# Patient Record
Sex: FEMALE | Race: BLACK OR AFRICAN AMERICAN | NOT HISPANIC OR LATINO | Employment: FULL TIME | URBAN - METROPOLITAN AREA
[De-identification: names, ages, dates, MRNs, and addresses within clinical notes are randomized per-mention and may not be internally consistent; named-entity substitution may affect disease eponyms.]

---
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
## 2022-03-07 ENCOUNTER — TELEPHONE (OUTPATIENT)
Dept: OBSTETRICS AND GYNECOLOGY | Age: 22
End: 2022-03-07

## 2022-03-07 DIAGNOSIS — N92.6 MISSED MENSES: Primary | ICD-10-CM

## 2022-03-07 NOTE — TELEPHONE ENCOUNTER
Please advise if you will place HCG orders. PT has taken 2 positive pregnancy tests and would still like to have labs drawn, pt understands this will not necessarily confirm the pregnancy or tell her how many weeks she may be.

## 2022-03-09 ENCOUNTER — TELEPHONE (OUTPATIENT)
Dept: OBSTETRICS AND GYNECOLOGY | Age: 22
End: 2022-03-09

## 2022-03-09 DIAGNOSIS — O21.9 NAUSEA AND VOMITING DURING PREGNANCY: Primary | ICD-10-CM

## 2022-03-09 LAB — HCG INTACT+B SERPL-ACNC: NORMAL MIU/ML

## 2022-03-09 RX ORDER — DIPHENHYDRAMINE HYDROCHLORIDE 25 MG/1
25 CAPSULE ORAL 3 TIMES DAILY
Qty: 90 TABLET | Refills: 2 | Status: SHIPPED | OUTPATIENT
Start: 2022-03-09 | End: 2022-09-15

## 2022-03-09 NOTE — PROGRESS NOTES
Patient notified about results - scheduled pregn tahmina 3/16/22 - lmp 12/30/21 . Patient experiencing nausea would like something to be called into pharmacy .

## 2022-03-16 ENCOUNTER — OFFICE VISIT (OUTPATIENT)
Dept: OBSTETRICS AND GYNECOLOGY | Age: 22
End: 2022-03-16

## 2022-03-16 VITALS
SYSTOLIC BLOOD PRESSURE: 108 MMHG | DIASTOLIC BLOOD PRESSURE: 62 MMHG | WEIGHT: 123.8 LBS | BODY MASS INDEX: 20.62 KG/M2 | HEIGHT: 65 IN

## 2022-03-16 DIAGNOSIS — Z3A.01 LESS THAN 8 WEEKS GESTATION OF PREGNANCY: Primary | ICD-10-CM

## 2022-03-16 DIAGNOSIS — O21.9 NAUSEA AND VOMITING DURING PREGNANCY: ICD-10-CM

## 2022-03-16 PROCEDURE — 99214 OFFICE O/P EST MOD 30 MIN: CPT | Performed by: NURSE PRACTITIONER

## 2022-03-16 NOTE — PROGRESS NOTES
"Subjective     Chief Complaint   Patient presents with   • Follow-up     GYN F/U, Pregnancy Confirmation, LMP 2021, Pt C/O of morning sickness with vomiting       Paulette Martinez is a 21 y.o.  whose LMP is Patient's last menstrual period was 2021 (exact date).     Pt presents today for confirmation of pregnancy  U/S confirms live IUP at 6+5  LMP not c/w u/s  EDC 2022  She is complaining of morning sickness with vomiting  She was taking vitamin B6, never got the unisom  No other concerns or complaints today   Pt of Dr. Metz     No Additional Complaints Reported    The following portions of the patient's history were reviewed and updated as appropriate:vital signs, allergies, current medications, past medical history, past social history, past surgical history and problem list      Review of Systems   Pertinent items are noted in HPI.     Objective      /62   Ht 165.1 cm (65\")   Wt 56.2 kg (123 lb 12.8 oz)   LMP 2021 (Exact Date)   Breastfeeding No   BMI 20.60 kg/m²     Physical Exam    General:   alert and no distress   Heart: Not performed today   Lungs: Not performed today.   Breast: Not performed today   Neck: na   Abdomen: {Not performed today   CVA: Not performed today   Pelvis: Not performed today   Extremities: Not performed today   Neurologic: negative   Psychiatric: Normal affect, judgement, and mood       Lab Review   Labs: No data reviewed     Imaging   Ultrasound - Pelvic Vaginal    Assessment/Plan     ASSESSMENT  1. Less than 8 weeks gestation of pregnancy    2. Nausea and vomiting during pregnancy        PLAN  1.   Orders Placed This Encounter   Procedures   • Urine Culture - Urine, Urine, Clean Catch   • Chlamydia trachomatis, Neisseria gonorrhoeae, Trichomonas vaginalis, PCR - Swab, Urine, Clean Catch   • OB Panel With HIV   • Varicella Zoster Antibody, IgG   • Hemoglobinopathy Fractionation Cascade   • Drug Profile Urine - 9 Drugs - Urine, Clean Catch "       2. Medications prescribed this encounter:        New Medications Ordered This Visit   Medications   • doxylamine (UNISOM) 25 MG tablet     Sig: Take 1 tablet by mouth At Night As Needed for Sleep.     Dispense:  30 tablet     Refill:  1       3. Unisom sent to correct pharmacy. Prenatal folder and nipt info given to patient. Prenatal labs today. SAB warnings.    Follow up: 4 week(s) with Dr. Isaías Adkins, APRN  3/16/2022

## 2022-03-28 LAB
ABO GROUP BLD: ABNORMAL
AMPHETAMINES UR QL SCN: NEGATIVE NG/ML
BACTERIA UR CULT: NO GROWTH
BACTERIA UR CULT: NORMAL
BARBITURATES UR QL SCN: NEGATIVE NG/ML
BASOPHILS # BLD AUTO: 0 X10E3/UL (ref 0–0.2)
BASOPHILS NFR BLD AUTO: 0 %
BENZODIAZ UR QL: NEGATIVE NG/ML
BLD GP AB SCN SERPL QL: NEGATIVE
BZE UR QL: NEGATIVE NG/ML
C TRACH RRNA SPEC QL NAA+PROBE: NEGATIVE
CANNABINOIDS UR CFM-MCNC: POSITIVE NG/ML
EOSINOPHIL # BLD AUTO: 0 X10E3/UL (ref 0–0.4)
EOSINOPHIL NFR BLD AUTO: 0 %
ERYTHROCYTE [DISTWIDTH] IN BLOOD BY AUTOMATED COUNT: 13 % (ref 11.7–15.4)
HBV SURFACE AG SERPL QL IA: NEGATIVE
HCT VFR BLD AUTO: 38 % (ref 34–46.6)
HCV AB S/CO SERPL IA: <0.1 S/CO RATIO (ref 0–0.9)
HGB A MFR BLD ELPH: 97.9 % (ref 96.4–98.8)
HGB A2 MFR BLD ELPH: 2.1 % (ref 1.8–3.2)
HGB BLD-MCNC: 13 G/DL (ref 11.1–15.9)
HGB F MFR BLD ELPH: 0 % (ref 0–2)
HGB FRACT BLD-IMP: NORMAL
HGB S MFR BLD ELPH: 0 %
HIV 1+2 AB+HIV1 P24 AG SERPL QL IA: NON REACTIVE
IMM GRANULOCYTES # BLD AUTO: 0 X10E3/UL (ref 0–0.1)
IMM GRANULOCYTES NFR BLD AUTO: 0 %
LYMPHOCYTES # BLD AUTO: 1.3 X10E3/UL (ref 0.7–3.1)
LYMPHOCYTES NFR BLD AUTO: 14 %
MCH RBC QN AUTO: 30.8 PG (ref 26.6–33)
MCHC RBC AUTO-ENTMCNC: 34.2 G/DL (ref 31.5–35.7)
MCV RBC AUTO: 90 FL (ref 79–97)
METHADONE UR QL SCN: NEGATIVE NG/ML
MONOCYTES # BLD AUTO: 0.7 X10E3/UL (ref 0.1–0.9)
MONOCYTES NFR BLD AUTO: 8 %
N GONORRHOEA RRNA SPEC QL NAA+PROBE: NEGATIVE
NEUTROPHILS # BLD AUTO: 7.4 X10E3/UL (ref 1.4–7)
NEUTROPHILS NFR BLD AUTO: 78 %
OPIATES UR QL: NEGATIVE NG/ML
PCP UR QL: NEGATIVE NG/ML
PLATELET # BLD AUTO: 261 X10E3/UL (ref 150–450)
PROPOXYPH UR QL SCN: NEGATIVE NG/ML
RBC # BLD AUTO: 4.22 X10E6/UL (ref 3.77–5.28)
RH BLD: NEGATIVE
RPR SER QL: NON REACTIVE
RUBV IGG SERPL IA-ACNC: 16.9 INDEX
T VAGINALIS RRNA SPEC QL NAA+PROBE: NEGATIVE
VZV IGG SER IA-ACNC: 805 INDEX
WBC # BLD AUTO: 9.5 X10E3/UL (ref 3.4–10.8)

## 2022-03-30 PROBLEM — O26.899 RH NEGATIVE, ANTEPARTUM: Status: ACTIVE | Noted: 2022-03-30

## 2022-03-30 PROBLEM — Z67.91 RH NEGATIVE, ANTEPARTUM: Status: ACTIVE | Noted: 2022-03-30

## 2022-03-30 PROBLEM — F12.90 MARIJUANA USE: Status: ACTIVE | Noted: 2022-03-30

## 2022-04-11 ENCOUNTER — TELEPHONE (OUTPATIENT)
Dept: OBSTETRICS AND GYNECOLOGY | Age: 22
End: 2022-04-11

## 2022-04-11 NOTE — TELEPHONE ENCOUNTER
Pt unable to get out of bed, unable to keep down food or liquids since saterday.Pt asking for something different for nausea.dn   Universal Safety Interventions

## 2022-04-15 ENCOUNTER — INITIAL PRENATAL (OUTPATIENT)
Dept: OBSTETRICS AND GYNECOLOGY | Age: 22
End: 2022-04-15

## 2022-04-15 VITALS — DIASTOLIC BLOOD PRESSURE: 62 MMHG | WEIGHT: 122 LBS | BODY MASS INDEX: 20.3 KG/M2 | SYSTOLIC BLOOD PRESSURE: 106 MMHG

## 2022-04-15 DIAGNOSIS — O26.899 RH NEGATIVE, ANTEPARTUM: ICD-10-CM

## 2022-04-15 DIAGNOSIS — Z34.01 ENCOUNTER FOR PRENATAL CARE IN FIRST TRIMESTER OF FIRST PREGNANCY: Primary | ICD-10-CM

## 2022-04-15 DIAGNOSIS — Z13.89 SCREENING FOR BLOOD OR PROTEIN IN URINE: ICD-10-CM

## 2022-04-15 DIAGNOSIS — Z67.91 RH NEGATIVE, ANTEPARTUM: ICD-10-CM

## 2022-04-15 DIAGNOSIS — O21.9 NAUSEA AND VOMITING IN PREGNANCY: ICD-10-CM

## 2022-04-15 DIAGNOSIS — O23.41 URINARY TRACT INFECTION IN MOTHER DURING FIRST TRIMESTER OF PREGNANCY: ICD-10-CM

## 2022-04-15 PROBLEM — O23.40 UTI (URINARY TRACT INFECTION) DURING PREGNANCY: Status: ACTIVE | Noted: 2022-04-15

## 2022-04-15 LAB
BILIRUB BLD-MCNC: NEGATIVE MG/DL
GLUCOSE UR STRIP-MCNC: NEGATIVE MG/DL
KETONES UR QL: NEGATIVE
LEUKOCYTE EST, POC: NEGATIVE
NITRITE UR-MCNC: NORMAL MG/ML
PH UR: 5.5 [PH] (ref 5–8)
PROT UR STRIP-MCNC: NEGATIVE MG/DL
RBC # UR STRIP: NEGATIVE /UL
SP GR UR: 1.03 (ref 1–1.03)
UROBILINOGEN UR QL: NORMAL

## 2022-04-15 PROCEDURE — 99214 OFFICE O/P EST MOD 30 MIN: CPT | Performed by: OBSTETRICS & GYNECOLOGY

## 2022-04-15 RX ORDER — ONDANSETRON 4 MG/1
4 TABLET, FILM COATED ORAL EVERY 6 HOURS PRN
Qty: 30 TABLET | Refills: 1 | Status: SHIPPED | OUTPATIENT
Start: 2022-04-15 | End: 2022-06-10 | Stop reason: SDUPTHER

## 2022-04-15 RX ORDER — NITROFURANTOIN 25; 75 MG/1; MG/1
100 CAPSULE ORAL
COMMUNITY
Start: 2022-04-11 | End: 2022-04-18

## 2022-04-15 RX ORDER — ONDANSETRON 4 MG/1
TABLET, ORALLY DISINTEGRATING ORAL
COMMUNITY
Start: 2022-04-11 | End: 2022-04-15

## 2022-04-15 NOTE — PROGRESS NOTES
Chief Complaint   Patient presents with   • Routine Prenatal Visit     Cc: ob intake , sheldon today , prenatal labs normal - pt here with her boyfriend , nausea decreased ,macrobid for 6 more days - UTI        HPI: 21 y.o.  at 11w0d presents for prenatal care     Vitals:    04/15/22 0937   BP: 106/62   Weight: 55.3 kg (122 lb)     Total weight gain for pregnancy:  -0.454 kg (-1 lb)    Review of systems:     Gen: negative  CV:     negative  GI: nausea  :   negative  MS:    negative  Neuro: negative  Pul: negative      A/P  1. Intrauterine pregnancy at 11w0d   2. Pregnancy Risk:  HIGH RISK        Diagnoses and all orders for this visit:    1. Encounter for prenatal care in first trimester of first pregnancy (Primary)    2. Screening for blood or protein in urine  -     Cancel: POC Urinalysis Dipstick  -     POC Urinalysis Dipstick    3. Rh negative, antepartum    4. Nausea and vomiting in pregnancy  -     ondansetron (Zofran) 4 MG tablet; Take 1 tablet by mouth Every 6 (Six) Hours As Needed for Nausea or Vomiting.  Dispense: 30 tablet; Refill: 1    5. Urinary tract infection in mother during first trimester of pregnancy        Nutrition and weight gain were addressed.  Practice OB call structure was discussed.       -----------------------  PLAN:   Return in about 27 days (around 2022), or ob check.  OB intake done   Prenatal labs reviewed   Desires NIPT today   SAB warnings   Rh neg - rhogam at 28 weeks  4 weeks   Counseling was given to patient and partner for the following topics: diagnostic results, instructions for management, risk factor reductions and patient and family education . Total time of the encounter was 30 minutes and 25 minutes was spent counseling.      Cristy Metz MD  4/15/2022 11:39 EDT

## 2022-05-12 ENCOUNTER — ROUTINE PRENATAL (OUTPATIENT)
Dept: OBSTETRICS AND GYNECOLOGY | Age: 22
End: 2022-05-12

## 2022-05-12 VITALS — BODY MASS INDEX: 20.97 KG/M2 | DIASTOLIC BLOOD PRESSURE: 68 MMHG | SYSTOLIC BLOOD PRESSURE: 110 MMHG | WEIGHT: 126 LBS

## 2022-05-12 DIAGNOSIS — Z34.02 ENCOUNTER FOR PRENATAL CARE IN SECOND TRIMESTER OF FIRST PREGNANCY: Primary | ICD-10-CM

## 2022-05-12 DIAGNOSIS — O21.9 NAUSEA AND VOMITING IN PREGNANCY: ICD-10-CM

## 2022-05-12 DIAGNOSIS — O26.899 RH NEGATIVE, ANTEPARTUM: ICD-10-CM

## 2022-05-12 DIAGNOSIS — Z13.89 SCREENING FOR BLOOD OR PROTEIN IN URINE: ICD-10-CM

## 2022-05-12 DIAGNOSIS — Z67.91 RH NEGATIVE, ANTEPARTUM: ICD-10-CM

## 2022-05-12 LAB
BILIRUB BLD-MCNC: NEGATIVE MG/DL
CLARITY, POC: CLEAR
COLOR UR: YELLOW
GLUCOSE UR STRIP-MCNC: NEGATIVE MG/DL
KETONES UR QL: NEGATIVE
LEUKOCYTE EST, POC: NEGATIVE
NITRITE UR-MCNC: NEGATIVE MG/ML
PH UR: 8 [PH] (ref 5–8)
PROT UR STRIP-MCNC: NEGATIVE MG/DL
RBC # UR STRIP: NEGATIVE /UL
SP GR UR: 1.02 (ref 1–1.03)
UROBILINOGEN UR QL: NORMAL

## 2022-05-12 PROCEDURE — 99213 OFFICE O/P EST LOW 20 MIN: CPT | Performed by: OBSTETRICS & GYNECOLOGY

## 2022-05-12 NOTE — PROGRESS NOTES
Chief Complaint   Patient presents with   • Routine Prenatal Visit     ob ck; no cc        HPI: 21 y.o.  at 14w6d presents for prenatal care     Vitals:    22 1113   BP: 110/68   Weight: 57.2 kg (126 lb)     Total weight gain for pregnancy:  1.361 kg (3 lb)    Review of systems:     Gen: negative  CV:     negative  GI: negative  :   negative  MS:    negative  Neuro: negative  Pul: negative      A/P  1. Intrauterine pregnancy at 14w6d   2. Pregnancy Risk:  HIGH        Diagnoses and all orders for this visit:    1. Encounter for prenatal care in second trimester of first pregnancy (Primary)    2. Screening for blood or protein in urine  -     POC Urinalysis Dipstick    3. Rh negative, antepartum    4. Nausea and vomiting in pregnancy        Nutrition and weight gain were addressed.      -----------------------  PLAN:   Return in about 1 month (around 2022), or ob check and anatomy US.  NIPT LR   Nausea- improved   Enc increased H20   SAB warnings      Rh neg - Rhogam at 28 weeks    Cristy Metz MD  2022 11:36 EDT

## 2022-06-10 ENCOUNTER — ROUTINE PRENATAL (OUTPATIENT)
Dept: OBSTETRICS AND GYNECOLOGY | Age: 22
End: 2022-06-10

## 2022-06-10 VITALS — WEIGHT: 134 LBS | DIASTOLIC BLOOD PRESSURE: 72 MMHG | SYSTOLIC BLOOD PRESSURE: 120 MMHG | BODY MASS INDEX: 22.3 KG/M2

## 2022-06-10 DIAGNOSIS — O21.9 NAUSEA AND VOMITING IN PREGNANCY: ICD-10-CM

## 2022-06-10 DIAGNOSIS — Z34.02 ENCOUNTER FOR PRENATAL CARE IN SECOND TRIMESTER OF FIRST PREGNANCY: Primary | ICD-10-CM

## 2022-06-10 DIAGNOSIS — Z13.89 SCREENING FOR BLOOD OR PROTEIN IN URINE: ICD-10-CM

## 2022-06-10 DIAGNOSIS — Z36.86 ENCOUNTER FOR ANTENATAL SCREENING FOR CERVICAL LENGTH: ICD-10-CM

## 2022-06-10 DIAGNOSIS — Z36.89 ENCOUNTER FOR ROUTINE FETAL ULTRASOUND: ICD-10-CM

## 2022-06-10 DIAGNOSIS — Z67.91 RH NEGATIVE, ANTEPARTUM: ICD-10-CM

## 2022-06-10 DIAGNOSIS — O26.899 RH NEGATIVE, ANTEPARTUM: ICD-10-CM

## 2022-06-10 LAB
BILIRUB BLD-MCNC: NEGATIVE MG/DL
GLUCOSE UR STRIP-MCNC: NEGATIVE MG/DL
KETONES UR QL: NEGATIVE
LEUKOCYTE EST, POC: NEGATIVE
NITRITE UR-MCNC: NEGATIVE MG/ML
PH UR: 7 [PH] (ref 5–8)
PROT UR STRIP-MCNC: NEGATIVE MG/DL
RBC # UR STRIP: NEGATIVE /UL
SP GR UR: 1.02 (ref 1–1.03)
UROBILINOGEN UR QL: NORMAL

## 2022-06-10 PROCEDURE — 99213 OFFICE O/P EST LOW 20 MIN: CPT | Performed by: OBSTETRICS & GYNECOLOGY

## 2022-06-10 PROCEDURE — 76817 TRANSVAGINAL US OBSTETRIC: CPT | Performed by: OBSTETRICS & GYNECOLOGY

## 2022-06-10 PROCEDURE — 76805 OB US >/= 14 WKS SNGL FETUS: CPT | Performed by: OBSTETRICS & GYNECOLOGY

## 2022-06-10 RX ORDER — ONDANSETRON 4 MG/1
4 TABLET, FILM COATED ORAL EVERY 6 HOURS PRN
Qty: 30 TABLET | Refills: 1 | Status: SHIPPED | OUTPATIENT
Start: 2022-06-10 | End: 2022-09-15

## 2022-06-10 NOTE — PROGRESS NOTES
Chief Complaint   Patient presents with   • Routine Prenatal Visit     Cc: ob check and anatomy today, no complaints today - pnv samples given , pt needs a refill on zofran please uses as prn        HPI: 21 y.o.  at 19w0d presents for prenatal care      Vitals:    06/10/22 1002   BP: 120/72   Weight: 60.8 kg (134 lb)     Total weight gain for pregnancy:  4.99 kg (11 lb)    Review of systems:   Gen: negative  CV:     negative  GI: nausea  :   negative and good fetal movement noted   MS:    negative  Neuro: negative  Pul: negative    A/P  1. Intrauterine pregnancy at 19w0d   2. Pregnancy Risk:  HIGH RISK        Diagnoses and all orders for this visit:    1. Screening for blood or protein in urine (Primary)  -     POC Urinalysis Dipstick    2. Encounter for routine fetal ultrasound  -     US Ob 14 + Weeks Single or First Gestation    3. Encounter for  screening for cervical length  -     US Ob Transvaginal    4. Rh negative, antepartum    5. Encounter for prenatal care in second trimester of first pregnancy    6. Nausea and vomiting in pregnancy  -     ondansetron (Zofran) 4 MG tablet; Take 1 tablet by mouth Every 6 (Six) Hours As Needed for Nausea or Vomiting.  Dispense: 30 tablet; Refill: 1        Nutrition and weight gain were addressed.  -----------------------  PLAN:   Return in about 31 days (around 2022), or ob check.  Normal completed anatomy today   Normal CL today   Nausea - controlled with Zofran   SAB warnings     Cristy Metz MD  6/10/2022 10:26 EDT

## 2022-07-11 ENCOUNTER — ROUTINE PRENATAL (OUTPATIENT)
Dept: OBSTETRICS AND GYNECOLOGY | Age: 22
End: 2022-07-11

## 2022-07-11 DIAGNOSIS — Z34.02 ENCOUNTER FOR PRENATAL CARE IN SECOND TRIMESTER OF FIRST PREGNANCY: Primary | ICD-10-CM

## 2022-07-11 DIAGNOSIS — Z67.91 RH NEGATIVE, ANTEPARTUM: ICD-10-CM

## 2022-07-11 DIAGNOSIS — O26.899 RH NEGATIVE, ANTEPARTUM: ICD-10-CM

## 2022-07-11 DIAGNOSIS — Z13.89 SCREENING FOR BLOOD OR PROTEIN IN URINE: ICD-10-CM

## 2022-07-11 PROCEDURE — 99213 OFFICE O/P EST LOW 20 MIN: CPT | Performed by: OBSTETRICS & GYNECOLOGY

## 2022-07-11 NOTE — PROGRESS NOTES
Chief Complaint   Patient presents with   • Routine Prenatal Visit     Cc: ob check doing well - more pnv samples given to pt        HPI: 21 y.o.  at 23w3d presents for prenatal care      Vitals:    22 1151   BP: 112/67   Weight: 63.5 kg (140 lb)     Total weight gain for pregnancy:  7.711 kg (17 lb)    Review of systems:   Gen: negative  CV:     negative  GI: negative  :   negative and good fetal movement noted   MS:    negative  Neuro: negative  Pul: negative    A/P  1. Intrauterine pregnancy at 23w3d   2. Pregnancy Risk:  HIGH RISK        Diagnoses and all orders for this visit:    1. Encounter for prenatal care in second trimester of first pregnancy (Primary)    2. Screening for blood or protein in urine  -     POC Urinalysis Dipstick    3. Rh negative, antepartum         labor was discussed.  Warnings were provided.  Nutrition and weight gain were addressed.  -----------------------  PLAN:   Return in about 4 weeks (around 2022), or ob check and one-hour gtt and growth US.  Rh neg - Rhogam next visit   One-hour gtt and tdap next visit   PTL warnings   4 weeks for growth US        Cristy Metz MD  2022 12:02 EDT

## 2022-08-04 ENCOUNTER — REFERRAL TRIAGE (OUTPATIENT)
Dept: LABOR AND DELIVERY | Facility: HOSPITAL | Age: 22
End: 2022-08-04

## 2022-08-05 ENCOUNTER — PATIENT OUTREACH (OUTPATIENT)
Dept: LABOR AND DELIVERY | Facility: HOSPITAL | Age: 22
End: 2022-08-05

## 2022-08-05 NOTE — OUTREACH NOTE
[facility]  Enrollment    Current Estimated Gestational Age: 27w0d    Questions/Answers    Flowsheet Row Responses   Would like to participate? Yes          F/U 8/15    Stephany Thomas, RN  Maternity Nurse Navigator    8/5/2022, 14:48 EDT

## 2022-08-08 ENCOUNTER — ROUTINE PRENATAL (OUTPATIENT)
Dept: OBSTETRICS AND GYNECOLOGY | Age: 22
End: 2022-08-08

## 2022-08-08 VITALS — DIASTOLIC BLOOD PRESSURE: 64 MMHG | SYSTOLIC BLOOD PRESSURE: 110 MMHG | BODY MASS INDEX: 26.13 KG/M2 | WEIGHT: 157 LBS

## 2022-08-08 VITALS — WEIGHT: 149 LBS | SYSTOLIC BLOOD PRESSURE: 112 MMHG | BODY MASS INDEX: 24.79 KG/M2 | DIASTOLIC BLOOD PRESSURE: 67 MMHG

## 2022-08-08 DIAGNOSIS — Z34.02 ENCOUNTER FOR PRENATAL CARE IN SECOND TRIMESTER OF FIRST PREGNANCY: Primary | ICD-10-CM

## 2022-08-08 DIAGNOSIS — Z13.0 SCREENING FOR IRON DEFICIENCY ANEMIA: ICD-10-CM

## 2022-08-08 DIAGNOSIS — O99.019 MATERNAL ANEMIA IN PREGNANCY, ANTEPARTUM: ICD-10-CM

## 2022-08-08 DIAGNOSIS — Z23 ENCOUNTER FOR ADMINISTRATION OF VACCINE: ICD-10-CM

## 2022-08-08 DIAGNOSIS — O26.892 RH NEGATIVE STATUS DURING PREGNANCY IN SECOND TRIMESTER: ICD-10-CM

## 2022-08-08 DIAGNOSIS — Z13.1 SCREENING FOR DIABETES MELLITUS: ICD-10-CM

## 2022-08-08 DIAGNOSIS — Z13.89 SCREENING FOR BLOOD OR PROTEIN IN URINE: ICD-10-CM

## 2022-08-08 DIAGNOSIS — O26.899 RH NEGATIVE, ANTEPARTUM: ICD-10-CM

## 2022-08-08 DIAGNOSIS — Z67.91 RH NEGATIVE, ANTEPARTUM: ICD-10-CM

## 2022-08-08 DIAGNOSIS — Z67.91 RH NEGATIVE STATUS DURING PREGNANCY IN SECOND TRIMESTER: ICD-10-CM

## 2022-08-08 DIAGNOSIS — Z3A.27 27 WEEKS GESTATION OF PREGNANCY: ICD-10-CM

## 2022-08-08 LAB
BILIRUB BLD-MCNC: NEGATIVE MG/DL
GLUCOSE UR STRIP-MCNC: NEGATIVE MG/DL
KETONES UR QL: NEGATIVE
LEUKOCYTE EST, POC: NEGATIVE
NITRITE UR-MCNC: NEGATIVE MG/ML
PH UR: 6 [PH] (ref 5–8)
PROT UR STRIP-MCNC: NEGATIVE MG/DL
RBC # UR STRIP: NEGATIVE /UL
SP GR UR: 1.02 (ref 1–1.03)
UROBILINOGEN UR QL: NORMAL

## 2022-08-08 PROCEDURE — 90715 TDAP VACCINE 7 YRS/> IM: CPT | Performed by: OBSTETRICS & GYNECOLOGY

## 2022-08-08 PROCEDURE — 96372 THER/PROPH/DIAG INJ SC/IM: CPT | Performed by: OBSTETRICS & GYNECOLOGY

## 2022-08-08 PROCEDURE — 90471 IMMUNIZATION ADMIN: CPT | Performed by: OBSTETRICS & GYNECOLOGY

## 2022-08-08 PROCEDURE — 99213 OFFICE O/P EST LOW 20 MIN: CPT | Performed by: OBSTETRICS & GYNECOLOGY

## 2022-08-08 NOTE — PROGRESS NOTES
Chief Complaint   Patient presents with   • Routine Prenatal Visit     Cc: ob check and 1 hr gtt with growth us - tdap and rhogam , no ob complaints        HPI: 21 y.o.  at 27w3d presents for prenatal care      Vitals:    22 1139   BP: 110/64   Weight: 71.2 kg (157 lb)     Total weight gain for pregnancy:  15.4 kg (34 lb)    Review of systems:   Gen: negative  CV:     negative  GI: negative  :   negative and good fetal movement noted   MS:    negative  Neuro: negative  Pul: negative    A/P  1. Intrauterine pregnancy at 27w3d   2. Pregnancy Risk:  HIGH RISK        Diagnoses and all orders for this visit:    1. Encounter for prenatal care in second trimester of first pregnancy (Primary)    2. Screening for iron deficiency anemia  -     Gestational Screen 1 Hr ([facility])  -     POC Urinalysis Dipstick  -     Hemoglobin & Hematocrit, Blood  -     Antibody Screen    3. Screening for diabetes mellitus  -     Gestational Screen 1 Hr ([facility])  -     POC Urinalysis Dipstick  -     Hemoglobin & Hematocrit, Blood  -     Antibody Screen    4. Encounter for administration of vaccine  -     Gestational Screen 1 Hr ([facility])  -     POC Urinalysis Dipstick  -     Hemoglobin & Hematocrit, Blood  -     Antibody Screen    5. Screening for blood or protein in urine  -     Gestational Screen 1 Hr ([facility])  -     POC Urinalysis Dipstick  -     Hemoglobin & Hematocrit, Blood  -     Antibody Screen    6. Rh negative status during pregnancy in second trimester  -     Gestational Screen 1 Hr ([facility])  -     POC Urinalysis Dipstick  -     Hemoglobin & Hematocrit, Blood  -     Antibody Screen    7. 27 weeks gestation of pregnancy  -     Gestational Screen 1 Hr ([facility])  -     POC Urinalysis Dipstick  -     Hemoglobin & Hematocrit, Blood  -     Antibody Screen    8. Rh negative, antepartum    Other orders  -     Rhogam Immune Globulin Immunization  -     Tdap Vaccine Greater Than or Equal To 6yo IM         labor was  discussed.  Warnings were provided.  -----------------------  PLAN:   Return in about 2 weeks (around 8/22/2022), or ob check.  One-hour gtt and tdap today   Rh neg - Rhogam today   LGA on US today - EFW 95%  Serial growth   PTL warnings        Cristy Metz MD  8/8/2022 11:43 EDT

## 2022-08-09 PROBLEM — O99.019 MATERNAL ANEMIA IN PREGNANCY, ANTEPARTUM: Status: ACTIVE | Noted: 2022-08-09

## 2022-08-09 LAB
BLD GP AB SCN SERPL QL: NEGATIVE
GLUCOSE 1H P 50 G GLC PO SERPL-MCNC: 115 MG/DL (ref 65–139)
HCT VFR BLD AUTO: 28.2 % (ref 34–46.6)
HGB BLD-MCNC: 9.3 G/DL (ref 11.1–15.9)

## 2022-08-09 RX ORDER — FERROUS SULFATE 325(65) MG
325 TABLET ORAL
Qty: 60 TABLET | Refills: 2 | Status: SHIPPED | OUTPATIENT
Start: 2022-08-09 | End: 2023-01-13

## 2022-08-09 NOTE — PROGRESS NOTES
Call patient and notify of normal results of one -hour gtt but very anemic - start Iron supplement twice per day sent to pharmacy

## 2022-08-15 ENCOUNTER — PATIENT OUTREACH (OUTPATIENT)
Dept: LABOR AND DELIVERY | Facility: HOSPITAL | Age: 22
End: 2022-08-15

## 2022-08-15 NOTE — OUTREACH NOTE
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 physically tired and sore, but mentally good   Are you having any of the following symptoms? Other   Other Symptoms: leg cramps   Questions regarding prenatal visits or tests to be ordered? No   New Diagnosis Anemia  [taking PO iron, baby in the 95th precentile ]   Education related to new diagnoses/home equipment No   May I ask you questions about your substance use? Yes   Other Comment denies   Supplies ready for baby Car Seat, Clothing  [thinks she will get diapers, bottles, and bassinet at baby shower ]   Resource/Environmental Concerns None   Do you have any questions related to your care experience, your pregnancy, plans for delivery, any concerns, etc? No          [facility]  Intake    Current Estimated Gestational Age: 28w3d    Questions/Answers    Flowsheet Row Responses   Best Method for Contacting Cell   Do you have a dentist? Yes   Dentist Name [facility]   Have you seen a dentist in the last 6 months Yes   Next Appointment: 08/17/22  [root canal this week ]   Resources Presently Utilizing: WIC (Women, Infant, Children)   Maternal Warning Signs Provided   Other: Provided        F/U Oct 10    Stephany Thomas, RN  Maternity Nurse Navigator    8/15/2022, 17:10 EDT

## 2022-08-18 ENCOUNTER — TELEPHONE (OUTPATIENT)
Dept: OBSTETRICS AND GYNECOLOGY | Age: 22
End: 2022-08-18

## 2022-08-22 ENCOUNTER — ROUTINE PRENATAL (OUTPATIENT)
Dept: OBSTETRICS AND GYNECOLOGY | Age: 22
End: 2022-08-22

## 2022-08-22 VITALS — SYSTOLIC BLOOD PRESSURE: 110 MMHG | WEIGHT: 161 LBS | BODY MASS INDEX: 26.79 KG/M2 | DIASTOLIC BLOOD PRESSURE: 74 MMHG

## 2022-08-22 DIAGNOSIS — O26.899 RH NEGATIVE, ANTEPARTUM: ICD-10-CM

## 2022-08-22 DIAGNOSIS — Z13.89 SCREENING FOR BLOOD OR PROTEIN IN URINE: ICD-10-CM

## 2022-08-22 DIAGNOSIS — O99.019 MATERNAL ANEMIA IN PREGNANCY, ANTEPARTUM: ICD-10-CM

## 2022-08-22 DIAGNOSIS — Z34.03 ENCOUNTER FOR PRENATAL CARE IN THIRD TRIMESTER OF FIRST PREGNANCY: Primary | ICD-10-CM

## 2022-08-22 DIAGNOSIS — O36.62X0 EXCESSIVE FETAL GROWTH AFFECTING MANAGEMENT OF PREGNANCY IN SECOND TRIMESTER, SINGLE OR UNSPECIFIED FETUS: ICD-10-CM

## 2022-08-22 DIAGNOSIS — Z67.91 RH NEGATIVE, ANTEPARTUM: ICD-10-CM

## 2022-08-22 PROBLEM — O36.60X0 LGA (LARGE FOR GESTATIONAL AGE) FETUS AFFECTING MANAGEMENT OF MOTHER: Status: ACTIVE | Noted: 2022-08-22

## 2022-08-22 LAB
BILIRUB BLD-MCNC: NEGATIVE MG/DL
GLUCOSE UR STRIP-MCNC: NEGATIVE MG/DL
KETONES UR QL: NEGATIVE
LEUKOCYTE EST, POC: NEGATIVE
NITRITE UR-MCNC: NEGATIVE MG/ML
PH UR: 6 [PH] (ref 5–8)
PROT UR STRIP-MCNC: NEGATIVE MG/DL
RBC # UR STRIP: NEGATIVE /UL
SP GR UR: 1.01 (ref 1–1.03)
UROBILINOGEN UR QL: NORMAL

## 2022-08-22 PROCEDURE — 99213 OFFICE O/P EST LOW 20 MIN: CPT | Performed by: OBSTETRICS & GYNECOLOGY

## 2022-08-22 RX ORDER — CEPHALEXIN 500 MG/1
CAPSULE ORAL
COMMUNITY
Start: 2022-08-20 | End: 2022-09-15

## 2022-08-22 NOTE — PROGRESS NOTES
Chief Complaint   Patient presents with   • Routine Prenatal Visit     Cc: ob check - daily iron - anemia , no ob complaints - having dental procedure end of this month        HPI: 21 y.o.  at 29w3d presents for prenatal care     Vitals:    22 0931   BP: 110/74   Weight: 73 kg (161 lb)     Total weight gain for pregnancy:  17.2 kg (38 lb)    Review of systems:   Gen: negative  CV:     negative  GI: negative  :   negative and good fetal movement noted   MS:    negative  Neuro: negative  Pul: negative    A/P  1. Intrauterine pregnancy at 29w3d   2. Pregnancy Risk:  HIGH RISK        Diagnoses and all orders for this visit:    1. Encounter for prenatal care in third trimester of first pregnancy (Primary)    2. Screening for blood or protein in urine  -     POC Urinalysis Dipstick    3. Rh negative, antepartum    4. Maternal anemia in pregnancy, antepartum    5. Excessive fetal growth affecting management of pregnancy in second trimester, single or unspecified fetus         labor was discussed.  Warnings were provided.  -----------------------  PLAN:   Return in about 18 days (around 2022), or ob check and growth US.  Rh neg - s/p Rhogam   Anemia=- cont Iron BID   Normal one-hour gtt   LGA -- repeat growth next visit   PTL warnings     Cristy Metz MD  2022 09:44 EDT

## 2022-09-09 ENCOUNTER — ROUTINE PRENATAL (OUTPATIENT)
Dept: OBSTETRICS AND GYNECOLOGY | Age: 22
End: 2022-09-09

## 2022-09-09 VITALS — BODY MASS INDEX: 28.12 KG/M2 | SYSTOLIC BLOOD PRESSURE: 110 MMHG | WEIGHT: 169 LBS | DIASTOLIC BLOOD PRESSURE: 74 MMHG

## 2022-09-09 DIAGNOSIS — O26.899 RH NEGATIVE, ANTEPARTUM: ICD-10-CM

## 2022-09-09 DIAGNOSIS — Z67.91 RH NEGATIVE, ANTEPARTUM: ICD-10-CM

## 2022-09-09 DIAGNOSIS — Z13.89 SCREENING FOR BLOOD OR PROTEIN IN URINE: ICD-10-CM

## 2022-09-09 DIAGNOSIS — F12.90 MARIJUANA USE: ICD-10-CM

## 2022-09-09 DIAGNOSIS — O36.62X0 EXCESSIVE FETAL GROWTH AFFECTING MANAGEMENT OF PREGNANCY IN SECOND TRIMESTER, SINGLE OR UNSPECIFIED FETUS: ICD-10-CM

## 2022-09-09 DIAGNOSIS — Z34.03 ENCOUNTER FOR PRENATAL CARE IN THIRD TRIMESTER OF FIRST PREGNANCY: Primary | ICD-10-CM

## 2022-09-09 DIAGNOSIS — O99.019 MATERNAL ANEMIA IN PREGNANCY, ANTEPARTUM: ICD-10-CM

## 2022-09-09 LAB
BILIRUB BLD-MCNC: NEGATIVE MG/DL
GLUCOSE UR STRIP-MCNC: NEGATIVE MG/DL
KETONES UR QL: NEGATIVE
LEUKOCYTE EST, POC: ABNORMAL
NITRITE UR-MCNC: NEGATIVE MG/ML
PH UR: 6.5 [PH] (ref 5–8)
PROT UR STRIP-MCNC: NEGATIVE MG/DL
RBC # UR STRIP: NEGATIVE /UL
SP GR UR: 1.02 (ref 1–1.03)
UROBILINOGEN UR QL: NORMAL

## 2022-09-09 PROCEDURE — 99213 OFFICE O/P EST LOW 20 MIN: CPT | Performed by: OBSTETRICS & GYNECOLOGY

## 2022-09-09 NOTE — PROGRESS NOTES
Chief Complaint   Patient presents with   • Routine Prenatal Visit     Cc; ob check , doing well - reports good FM - denies any vag bleeding or fluid loss , daily iron intake - anemia        HPI: 22 y.o.  at 32w0d presents for prenatal care     Vitals:    22 1031   BP: 110/74   Weight: 76.7 kg (169 lb)     Total weight gain for pregnancy:  20.9 kg (46 lb)    Review of systems:   Gen: negative  CV:     negative  GI: negative  :   negative and good fetal movement noted   MS:    negative  Neuro: negative  Pul: negative    A/P  1. Intrauterine pregnancy at 32w0d   2. Pregnancy Risk:  HIGH RISK        Diagnoses and all orders for this visit:    1. Screening for blood or protein in urine (Primary)  -     POC Urinalysis Dipstick    2. Marijuana use    3. Rh negative, antepartum    4. Excessive fetal growth affecting management of pregnancy in second trimester, single or unspecified fetus    5. Maternal anemia in pregnancy, antepartum         labor was discussed.  Warnings were provided.  Nutrition and weight gain were addressed.  -----------------------  PLAN:   Return in about 2 weeks (around 2022), or ob check and growth US.   LGA/excessive weight gain - Growth US 2 weeks   Anemia- cont Iron - recheck next visit   PTL warnings   Rh neg - s/p rhogam   2 weeks         Cristy Metz MD  2022 10:52 EDT

## 2022-09-15 ENCOUNTER — ROUTINE PRENATAL (OUTPATIENT)
Dept: OBSTETRICS AND GYNECOLOGY | Age: 22
End: 2022-09-15

## 2022-09-15 VITALS — SYSTOLIC BLOOD PRESSURE: 112 MMHG | BODY MASS INDEX: 28.12 KG/M2 | DIASTOLIC BLOOD PRESSURE: 68 MMHG | WEIGHT: 169 LBS

## 2022-09-15 DIAGNOSIS — N89.8 VAGINAL DISCHARGE: ICD-10-CM

## 2022-09-15 DIAGNOSIS — N89.8 VAGINAL PRURITUS: ICD-10-CM

## 2022-09-15 DIAGNOSIS — Z3A.32 32 WEEKS GESTATION OF PREGNANCY: Primary | ICD-10-CM

## 2022-09-15 PROCEDURE — 99213 OFFICE O/P EST LOW 20 MIN: CPT | Performed by: NURSE PRACTITIONER

## 2022-09-15 RX ORDER — PRENATAL VIT NO.126/IRON/FOLIC 28MG-0.8MG
TABLET ORAL DAILY
COMMUNITY
End: 2022-12-14

## 2022-09-15 RX ORDER — MICONAZOLE NITRATE 1200MG-2%
1 KIT VAGINAL
Qty: 1 KIT | Refills: 0 | Status: ON HOLD | OUTPATIENT
Start: 2022-09-15 | End: 2022-09-30

## 2022-09-15 NOTE — PROGRESS NOTES
Chief Complaint   Patient presents with   • Pregnancy Problem     Vaginal itching for a week       HPI: 22 y.o.  at 32w6d     Pt presents today for add on OB visit  Chief complaint of vaginal itching x 1 week  She has not noticed discharge  She is SA with her boyfriend  She reports good FM  Denies LOF, bleeding or ctx's  Pt of Dr. Metz     Vitals:    09/15/22 1034   BP: 112/68   Weight: 76.7 kg (169 lb)       ROS:  GI:  Negative  : Vaginal itching  Pulmonary: Negative     A/P  1. Intrauterine pregnancy at 32w6d   2. Pregnancy Risk:  NORMAL     SSE - White vaginal discharge noted, swab collected    Diagnoses and all orders for this visit:    1. 32 weeks gestation of pregnancy (Primary)    2. Vaginal pruritus  -     NuSwab VG+ - Swab, Vagina    3. Vaginal discharge  -     NuSwab VG+ - Swab, Vagina    Other orders  -     Miconazole Nitrate-Wipes (Monistat 7 Complete Therapy) 100-2 MG-% kit; Insert 1 suppository into the vagina every night at bedtime.  Dispense: 1 kit; Refill: 0        -----------------------  PLAN:   Treat for yeast   Vaginal swab done  Keep scheduled appt with Dr. Metz   Return for Next scheduled follow up.      CHARITO Gracia  9/15/2022 10:46 EDT

## 2022-09-23 ENCOUNTER — ROUTINE PRENATAL (OUTPATIENT)
Dept: OBSTETRICS AND GYNECOLOGY | Age: 22
End: 2022-09-23

## 2022-09-23 VITALS — DIASTOLIC BLOOD PRESSURE: 60 MMHG | BODY MASS INDEX: 29.29 KG/M2 | SYSTOLIC BLOOD PRESSURE: 106 MMHG | WEIGHT: 176 LBS

## 2022-09-23 DIAGNOSIS — O36.63X1 LGA (LARGE FOR GESTATIONAL AGE) FETUS AFFECTING MANAGEMENT OF MOTHER, THIRD TRIMESTER, FETUS 1: ICD-10-CM

## 2022-09-23 DIAGNOSIS — Z13.89 SCREENING FOR BLOOD OR PROTEIN IN URINE: ICD-10-CM

## 2022-09-23 DIAGNOSIS — O26.899 RH NEGATIVE, ANTEPARTUM: ICD-10-CM

## 2022-09-23 DIAGNOSIS — Z67.91 RH NEGATIVE, ANTEPARTUM: ICD-10-CM

## 2022-09-23 DIAGNOSIS — Z34.03 ENCOUNTER FOR PRENATAL CARE IN THIRD TRIMESTER OF FIRST PREGNANCY: Primary | ICD-10-CM

## 2022-09-23 DIAGNOSIS — O36.63X0 EXCESSIVE FETAL GROWTH AFFECTING MANAGEMENT OF PREGNANCY IN THIRD TRIMESTER, SINGLE OR UNSPECIFIED FETUS: ICD-10-CM

## 2022-09-23 DIAGNOSIS — O26.00 EXCESSIVE WEIGHT GAIN AFFECTING PREGNANCY: ICD-10-CM

## 2022-09-23 DIAGNOSIS — O99.019 MATERNAL ANEMIA IN PREGNANCY, ANTEPARTUM: ICD-10-CM

## 2022-09-23 PROCEDURE — 99213 OFFICE O/P EST LOW 20 MIN: CPT | Performed by: OBSTETRICS & GYNECOLOGY

## 2022-09-23 PROCEDURE — 76816 OB US FOLLOW-UP PER FETUS: CPT | Performed by: OBSTETRICS & GYNECOLOGY

## 2022-09-23 NOTE — PROGRESS NOTES
Chief Complaint   Patient presents with   • Routine Prenatal Visit     Cc:  ob check and growth us today - anemic -  Iron supplement twice per day, good FM - Monistat helped vag irritation        HPI: 22 y.o.  at 34w0d presents for prenatal care -      Vitals:    22 1315   BP: 106/60   Weight: 79.8 kg (176 lb)     Total weight gain for pregnancy:  24 kg (53 lb)    Review of systems:   Gen: negative  CV:     negative  GI: negative  :   negative and good fetal movement noted   MS:    negative  Neuro: negative  Pul: negative    A/P  1. Intrauterine pregnancy at 34w0d   2. Pregnancy Risk:  HIGH RISK        Diagnoses and all orders for this visit:    1. Encounter for prenatal care in third trimester of first pregnancy (Primary)    2. Screening for blood or protein in urine  -     POC Urinalysis Dipstick    3. LGA (large for gestational age) fetus affecting management of mother, third trimester, fetus 1  -     POC Urinalysis Dipstick  -     US ob follow up transabdominal approach    4. Excessive fetal growth affecting management of pregnancy in third trimester, single or unspecified fetus    5. Maternal anemia in pregnancy, antepartum  -     CBC (No Diff)    6. Rh negative, antepartum    7. Excessive weight gain affecting pregnancy         labor was discussed.  Warnings were provided.  -----------------------  PLAN:   Return in about 17 days (around 10/10/2022), or ob check.  LGA today on US - recheck 4 weeks   Anemia- check CBC   PTL warnings   Rh neg - s/p rhogam   GBS next visit       Cristy Metz MD  2022 13:36 EDT

## 2022-09-24 LAB
ERYTHROCYTE [DISTWIDTH] IN BLOOD BY AUTOMATED COUNT: 16.3 % (ref 12.3–15.4)
HCT VFR BLD AUTO: 32 % (ref 34–46.6)
HGB BLD-MCNC: 10.7 G/DL (ref 12–15.9)
MCH RBC QN AUTO: 29.6 PG (ref 26.6–33)
MCHC RBC AUTO-ENTMCNC: 33.4 G/DL (ref 31.5–35.7)
MCV RBC AUTO: 88.6 FL (ref 79–97)
PLATELET # BLD AUTO: 186 10*3/MM3 (ref 140–450)
RBC # BLD AUTO: 3.61 10*6/MM3 (ref 3.77–5.28)
WBC # BLD AUTO: 10.07 10*3/MM3 (ref 3.4–10.8)

## 2022-09-30 ENCOUNTER — HOSPITAL ENCOUNTER (EMERGENCY)
Facility: HOSPITAL | Age: 22
Discharge: HOME | End: 2022-09-30
Attending: OBSTETRICS & GYNECOLOGY | Admitting: OBSTETRICS & GYNECOLOGY

## 2022-09-30 ENCOUNTER — APPOINTMENT (OUTPATIENT)
Dept: ULTRASOUND IMAGING | Facility: HOSPITAL | Age: 22
End: 2022-09-30

## 2022-09-30 VITALS
RESPIRATION RATE: 18 BRPM | DIASTOLIC BLOOD PRESSURE: 62 MMHG | SYSTOLIC BLOOD PRESSURE: 119 MMHG | BODY MASS INDEX: 29.29 KG/M2 | HEART RATE: 101 BPM | HEIGHT: 65 IN | TEMPERATURE: 99.7 F

## 2022-09-30 LAB
FETAL RBC/RBC BLD FC-RTO: 0 %
HGB F BLD QL KLEIH BETKE: NORMAL

## 2022-09-30 PROCEDURE — 76818 FETAL BIOPHYS PROFILE W/NST: CPT

## 2022-09-30 PROCEDURE — 99283 EMERGENCY DEPT VISIT LOW MDM: CPT | Performed by: OBSTETRICS & GYNECOLOGY

## 2022-09-30 PROCEDURE — 59025 FETAL NON-STRESS TEST: CPT

## 2022-09-30 PROCEDURE — 85460 HEMOGLOBIN FETAL: CPT | Performed by: OBSTETRICS & GYNECOLOGY

## 2022-10-09 ENCOUNTER — HOSPITAL ENCOUNTER (EMERGENCY)
Facility: HOSPITAL | Age: 22
Discharge: HOME | End: 2022-10-09
Attending: OBSTETRICS & GYNECOLOGY | Admitting: OBSTETRICS & GYNECOLOGY

## 2022-10-09 VITALS
TEMPERATURE: 98.2 F | BODY MASS INDEX: 29.29 KG/M2 | DIASTOLIC BLOOD PRESSURE: 63 MMHG | SYSTOLIC BLOOD PRESSURE: 116 MMHG | OXYGEN SATURATION: 99 % | HEART RATE: 95 BPM | RESPIRATION RATE: 16 BRPM | HEIGHT: 65 IN

## 2022-10-09 PROCEDURE — 99283 EMERGENCY DEPT VISIT LOW MDM: CPT | Performed by: OBSTETRICS & GYNECOLOGY

## 2022-10-09 PROCEDURE — 59025 FETAL NON-STRESS TEST: CPT

## 2022-10-09 RX ORDER — CLOTRIMAZOLE 1 %
7 CREAM WITH APPLICATOR VAGINAL
Status: ON HOLD | COMMUNITY
Start: 2022-09-15 | End: 2022-10-09

## 2022-10-09 NOTE — OBED NOTES
[facility] Note [facility]        Patient Name: Paulette Martinez  YOB: 2000  MRN: [id number]  Admission Date: 10/9/2022 12:07 AM  Date of Service: 10/9/2022    Chief Complaint: Vaginal Bleeding (HILDA- Pt reports noting vaginal bleeding when wiping in the bathroom at approx. 1900 this evening. Pt reports sexual intercourse in the last 48 hours. Reports +FM, denies ctx and LOF )        Subjective     Paulette Martinez is a 22 y.o. female  at 36w2d with Estimated Date of Delivery: 22 who presents with the chief complaint listed above.  She sees Cristy Metz MD for her prenatal care. Her pregnancy has been complicated by:  excessive weight gain, fetal macrosomia, anemia, marijuana use, Rh negative, ankle fracture.    Patient noticed some bleeding when wiping earlier.  She denies abdominal pain or contractions.  She has not had any further bleeding since arriving and feels well overall.  She denies abdominal trauma.      She describes fetal movement as normal.  She denies rupture of membranes.  She denies vaginal bleeding. She is not feeling contractions.          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]    • Closed left ankle fracture, with routine healing, subsequent encounter [J69.679T]         OB History    Para Term  AB Living   1 0 0 0 0 0   SAB IAB Ectopic Molar Multiple Live Births   0 0 0 0 0 0      # Outcome Date GA Lbr Judson/2nd Weight Sex Delivery Anes PTL Lv   1 Current               Obstetric Comments   3/16/22 - IUP at 6-5 weeks - BIPIN 22 - [facility]    6/10/22  - 19-0  weeksNormal completed anatomy - [facility]    8/8/22 - 27-3 weeks - EFW 95%, AC 95% - [facility]    9/23/22 - 34-0 weeks - EFW 98%, AC >99% - [facility]         Past Medical History:   Diagnosis Date   • Patient denies medical problems    • Trichomoniasis 08/17/2020   • Urogenital trichomoniasis        No past surgical history on file.    No current facility-administered medications on file prior to encounter.     Current Outpatient Medications on File Prior to Encounter   Medication Sig Dispense Refill   • ferrous sulfate 325 (65 FE) MG tablet Take 1 tablet by mouth Daily With Breakfast & Dinner. 60 tablet 2   • prenatal vitamin (prenatal, CLASSIC, vitamin) tablet Take  by mouth Daily.     • [DISCONTINUED] Miconazole 7 100 MG vaginal suppository Insert 7 suppositories into the vagina.         No Known Allergies    Family History   Problem Relation Age of Onset   • Bipolar disorder Mother    • Hypertension Paternal Grandmother    • Diabetes Paternal Grandmother    • Breast cancer Neg Hx    • Ovarian cancer Neg Hx    • Uterine cancer Neg Hx    • Colon cancer Neg Hx        Social History     Socioeconomic History   • Marital status: Single   Tobacco Use   • Smoking status: Never   • Smokeless tobacco: Never   Vaping Use   • Vaping Use: Never used   Substance and Sexual Activity   • Alcohol use: No   • Drug use: No   • Sexual activity: Yes     Partners: Male     Birth control/protection: None           Review of Systems   Constitutional: Negative for chills, fatigue and fever.   HENT: Negative for congestion, rhinorrhea and sore throat.    Eyes: Negative for visual disturbance.   Respiratory: Negative.    Cardiovascular: Negative.    Gastrointestinal: Negative for abdominal pain, constipation, diarrhea, nausea and vomiting.   Genitourinary: Positive for vaginal bleeding. Negative for difficulty urinating, dyspareunia, dysuria, flank pain, frequency, genital sores, hematuria, pelvic pain, urgency, vaginal discharge and vaginal pain.  "  Neurological: Negative for dizziness, seizures, light-headedness and headaches.   Psychiatric/Behavioral: Negative for sleep disturbance. The patient is not nervous/anxious.           PHYSICAL EXAM:      VITAL SIGNS:  Vitals:    10/09/22 0020 10/09/22 0022   BP: 116/63    BP Location: Right arm    Patient Position: Lying    Pulse: 87    Resp: 16    Temp:  98.2 °F (36.8 °C)   TempSrc:  Oral   SpO2: 100%    Height: 165.1 cm (65\")         FHT'S:                   Baseline:  140 BPM  Variability:  Moderate = 6 - 25 BPM  Accelerations:  15 x 15 accelerations present     Decelerations:  absent  Contractions:   absent     Interpretation:    Reactive NST, CAT 1 tracing        PHYSICAL EXAM:    General: well developed; well nourished  no acute distress   Heart: Not performed.   Lungs  : breathing is unlabored     Abdomen: soft, non-tender; no masses  no umbilical or inguinal hernias are present  no hepato-splenomegaly       Cervix: was checked (by RN): 0 cm / 1 % / -2  Cervical Dilation (cm): 0  Cervical Effacement: 0%  Fetal Station: -2  Cervical Consistency: firm  Cervical Position: posterior on speculum exam, no blood seen in vaginal vault and none seen at cervix; no active bleeding noted   Contractions: none        Extremities: peripheral pulses normal, no pedal edema, no clubbing or cyanosis      LABS AND TESTING ORDERED:  1. Uterine and fetal monitoring      LAB RESULTS:    No results found for this or any previous visit (from the past 24 hour(s)).    Lab Results   Component Value Date    ABO A 2022    RH Negative 2022       No results found for: STREPGPB              Assessment & Plan     ASSESSMENT/PLAN:  Paulette Martinez is a 22 y.o. female  at 36w2d who presented with: one time episode of bleeding when wiping earlier.  Patient without evidence of active bleeding on exam.  Her cervix is closed and she is not reacting.  Fetal status is reassuring.  She did receive Rhogam at 28 weeks gestation, so " "I do not think a repeat dose is indicated.  She was reassured and discharged home with return precautions reviewed.          Final Impression:  • Pregnancy at 36w2d  • Reactive NST.  CAT 1 tracing  • Vaginal bleeding, self-resolved  • Maternal vital signs were reviewed and were unremarkable              Vitals:    10/09/22 0020 10/09/22 0022   BP: 116/63    BP Location: Right arm    Patient Position: Lying    Pulse: 87    Resp: 16    Temp:  98.2 °F (36.8 °C)   TempSrc:  Oral   SpO2: 100%    Height: 165.1 cm (65\")    •     • No results found for: STREPGPB  Lab Results   Component Value Date    ABO A 03/16/2022    RH Negative 03/16/2022   •   • COVID - 19 status unknown      PLAN:       I have spent 30 minutes including face to face time with the patient, greater than 50% in discussion of the diagnosis (counseling) and/or coordination of care.     Suajta Maldonado MD  10/9/2022  00:36 EDT  OB Hospitalist  Phone:  [phone]  " no anemia, no easy bruising, no jaundice, no swollen lymph nodes.

## 2022-10-10 ENCOUNTER — ROUTINE PRENATAL (OUTPATIENT)
Dept: OBSTETRICS AND GYNECOLOGY | Age: 22
End: 2022-10-10

## 2022-10-10 ENCOUNTER — PATIENT OUTREACH (OUTPATIENT)
Dept: LABOR AND DELIVERY | Facility: HOSPITAL | Age: 22
End: 2022-10-10

## 2022-10-10 VITALS — BODY MASS INDEX: 29.79 KG/M2 | DIASTOLIC BLOOD PRESSURE: 72 MMHG | WEIGHT: 179 LBS | SYSTOLIC BLOOD PRESSURE: 110 MMHG

## 2022-10-10 DIAGNOSIS — O26.899 RH NEGATIVE, ANTEPARTUM: ICD-10-CM

## 2022-10-10 DIAGNOSIS — Z67.91 RH NEGATIVE, ANTEPARTUM: ICD-10-CM

## 2022-10-10 DIAGNOSIS — O99.019 MATERNAL ANEMIA IN PREGNANCY, ANTEPARTUM: ICD-10-CM

## 2022-10-10 DIAGNOSIS — Z34.03 ENCOUNTER FOR PRENATAL CARE IN THIRD TRIMESTER OF FIRST PREGNANCY: Primary | ICD-10-CM

## 2022-10-10 DIAGNOSIS — O36.63X0 EXCESSIVE FETAL GROWTH AFFECTING MANAGEMENT OF PREGNANCY IN THIRD TRIMESTER, SINGLE OR UNSPECIFIED FETUS: ICD-10-CM

## 2022-10-10 DIAGNOSIS — Z36.85 ANTENATAL SCREENING FOR STREPTOCOCCUS B: ICD-10-CM

## 2022-10-10 DIAGNOSIS — O26.00 EXCESSIVE WEIGHT GAIN AFFECTING PREGNANCY: ICD-10-CM

## 2022-10-10 DIAGNOSIS — Z13.89 SCREENING FOR BLOOD OR PROTEIN IN URINE: ICD-10-CM

## 2022-10-10 LAB
BILIRUB BLD-MCNC: NEGATIVE MG/DL
GLUCOSE UR STRIP-MCNC: NEGATIVE MG/DL
KETONES UR QL: NEGATIVE
LEUKOCYTE EST, POC: NEGATIVE
NITRITE UR-MCNC: NEGATIVE MG/ML
PH UR: 7 [PH] (ref 5–8)
PROT UR STRIP-MCNC: ABNORMAL MG/DL
RBC # UR STRIP: NEGATIVE /UL
SP GR UR: 1.02 (ref 1–1.03)
UROBILINOGEN UR QL: ABNORMAL

## 2022-10-10 PROCEDURE — 99213 OFFICE O/P EST LOW 20 MIN: CPT | Performed by: OBSTETRICS & GYNECOLOGY
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
## 2022-10-10 NOTE — OUTREACH NOTE
[facility]  Check-In    Current Estimated Gestational Age: 36w3d    Questions/Answers    Flowsheet Row Responses   Best Method for Contacting Cell   Demographics Reviewed Yes   Currently Employed No  [just doing school for now]   Able to keep appointments as scheduled Yes   Gender(s) and Name(s) boyLuis   Baby Active/Feeling Fetal Movemen Yes   How are you presently feeling? good   Questions regarding prenatal visits or tests to be ordered? No   May I ask you questions about your substance use? Yes   Other Comment denies   Supplies ready for baby Car Seat, Crib, Clothing, Diapers, Feeding Supplies, Breast Pump   Resource/Environmental Concerns None   Do you have any questions related to your care experience, your pregnancy, plans for delivery, any concerns, etc? No          Pt prepared for delivery, has all necessary supplies at home. Will follow up inpatient after delivery.     Stephany Thomas RN  Maternity Nurse Navigator    10/10/2022, 13:23 EDT

## 2022-10-11 ENCOUNTER — TELEPHONE (OUTPATIENT)
Dept: OBSTETRICS AND GYNECOLOGY | Age: 22
End: 2022-10-11

## 2022-10-11 NOTE — TELEPHONE ENCOUNTER
Called pt to see if there was FMLA paperwork we could fill out for her so we could start her leave early. She is going to contact her employer and reach back out to us when she finds out.
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
## 2022-10-14 PROBLEM — O99.820 GBS (GROUP B STREPTOCOCCUS CARRIER), +RV CULTURE, CURRENTLY PREGNANT: Status: ACTIVE | Noted: 2022-10-14

## 2022-10-14 LAB — B-HEM STREP SPEC QL CULT: POSITIVE

## 2022-10-19 ENCOUNTER — TELEPHONE (OUTPATIENT)
Dept: OBSTETRICS AND GYNECOLOGY | Age: 22
End: 2022-10-19

## 2022-10-19 NOTE — TELEPHONE ENCOUNTER
Caller: Paulette Martinez    Relationship: Self    Best call back number: [phone]    What form or medical record are you requesting: SHORT TERM DISABILITY     Who is requesting this form or medical record from you: EMPL[employer]ER [employer]     How would you like to receive the form or medical records (pick-up, mail, fax): FAX  If fax, what is the fax number: FAX NUMBER ON PAPERWORK     Timeframe paperwork needed: ASAP    Additional notes: EMPLOYER FAXED SHORT TERM DISABILITY PAPERWORK -[phone] ON 10/18/2022 PT WANTING TO MAKE SURE PAPERWORK WAS RECEIVED. PLEASE CALL TO LET HER KNOW.

## 2022-10-21 ENCOUNTER — ROUTINE PRENATAL (OUTPATIENT)
Dept: OBSTETRICS AND GYNECOLOGY | Age: 22
End: 2022-10-21

## 2022-10-21 VITALS — WEIGHT: 183 LBS | SYSTOLIC BLOOD PRESSURE: 110 MMHG | BODY MASS INDEX: 30.45 KG/M2 | DIASTOLIC BLOOD PRESSURE: 60 MMHG

## 2022-10-21 DIAGNOSIS — O99.820 GBS (GROUP B STREPTOCOCCUS CARRIER), +RV CULTURE, CURRENTLY PREGNANT: ICD-10-CM

## 2022-10-21 DIAGNOSIS — Z13.89 SCREENING FOR HEMATURIA OR PROTEINURIA: ICD-10-CM

## 2022-10-21 DIAGNOSIS — Z34.03 ENCOUNTER FOR PRENATAL CARE IN THIRD TRIMESTER OF FIRST PREGNANCY: Primary | ICD-10-CM

## 2022-10-21 DIAGNOSIS — O26.00 EXCESSIVE WEIGHT GAIN AFFECTING PREGNANCY: ICD-10-CM

## 2022-10-21 DIAGNOSIS — O36.63X0 EXCESSIVE FETAL GROWTH AFFECTING MANAGEMENT OF PREGNANCY IN THIRD TRIMESTER, SINGLE OR UNSPECIFIED FETUS: ICD-10-CM

## 2022-10-21 DIAGNOSIS — Z67.91 RH NEGATIVE, ANTEPARTUM: ICD-10-CM

## 2022-10-21 DIAGNOSIS — O26.899 RH NEGATIVE, ANTEPARTUM: ICD-10-CM

## 2022-10-21 DIAGNOSIS — O99.019 MATERNAL ANEMIA IN PREGNANCY, ANTEPARTUM: ICD-10-CM

## 2022-10-21 LAB
GLUCOSE UR STRIP-MCNC: NEGATIVE MG/DL
PROT UR STRIP-MCNC: NEGATIVE MG/DL

## 2022-10-21 PROCEDURE — 99213 OFFICE O/P EST LOW 20 MIN: CPT | Performed by: OBSTETRICS & GYNECOLOGY

## 2022-10-21 NOTE — PROGRESS NOTES
Chief Complaint   Patient presents with   • Routine Prenatal Visit     38weeks, growth u/s today, no complaints         HPI: 22 y.o.  at 38w0d presents for prenatal care     Vitals:    10/21/22 1024   BP: 110/60   Weight: 83 kg (183 lb)     Total weight gain for pregnancy:  27.2 kg (60 lb)    Review of systems:   Gen: negative  CV:     negative  GI: negative  :   negative and good fetal movement noted   MS:    negative  Neuro: negative  Pul: negative    A/P  1. Intrauterine pregnancy at 38w0d   2. Pregnancy Risk:  HIGH RISK        Diagnoses and all orders for this visit:    1. Screening for hematuria or proteinuria (Primary)  -     POC Urinalysis Dipstick        Routine labor warnings were discussed and indications for L & D f/u including bleeding, regular contractions, decreased fetal movement or/and rupture of membranes.   -----------------------  PLAN:   No follow-ups on file.  LGA infant - EFW 98% today 9lb 4 oz  Anemia- cont Iron   GBS +   RH neg - s/p Rhogam   Discussed option of primary csection and increased risk of SD with LGA infant - declines csection -  Discussed rec of IOL btw 39-40 weeks       Cristy Metz MD  10/21/2022 10:50 EDT

## 2022-10-26 ENCOUNTER — PREP FOR SURGERY (OUTPATIENT)
Dept: OTHER | Facility: HOSPITAL | Age: 22
End: 2022-10-26

## 2022-10-26 ENCOUNTER — ROUTINE PRENATAL (OUTPATIENT)
Dept: OBSTETRICS AND GYNECOLOGY | Age: 22
End: 2022-10-26

## 2022-10-26 VITALS — SYSTOLIC BLOOD PRESSURE: 120 MMHG | DIASTOLIC BLOOD PRESSURE: 70 MMHG | WEIGHT: 185 LBS | BODY MASS INDEX: 30.79 KG/M2

## 2022-10-26 DIAGNOSIS — O36.63X0 EXCESSIVE FETAL GROWTH AFFECTING MANAGEMENT OF PREGNANCY IN THIRD TRIMESTER, SINGLE OR UNSPECIFIED FETUS: ICD-10-CM

## 2022-10-26 DIAGNOSIS — Z67.91 RH NEGATIVE, ANTEPARTUM: ICD-10-CM

## 2022-10-26 DIAGNOSIS — Z13.89 SCREENING FOR BLOOD OR PROTEIN IN URINE: ICD-10-CM

## 2022-10-26 DIAGNOSIS — O99.820 GBS (GROUP B STREPTOCOCCUS CARRIER), +RV CULTURE, CURRENTLY PREGNANT: ICD-10-CM

## 2022-10-26 DIAGNOSIS — O36.63X0 EXCESSIVE FETAL GROWTH AFFECTING MANAGEMENT OF PREGNANCY IN THIRD TRIMESTER, SINGLE OR UNSPECIFIED FETUS: Primary | ICD-10-CM

## 2022-10-26 DIAGNOSIS — O99.019 MATERNAL ANEMIA IN PREGNANCY, ANTEPARTUM: ICD-10-CM

## 2022-10-26 DIAGNOSIS — Z34.03 ENCOUNTER FOR PRENATAL CARE IN THIRD TRIMESTER OF FIRST PREGNANCY: Primary | ICD-10-CM

## 2022-10-26 DIAGNOSIS — O26.899 RH NEGATIVE, ANTEPARTUM: ICD-10-CM

## 2022-10-26 LAB
BILIRUB BLD-MCNC: NEGATIVE MG/DL
GLUCOSE UR STRIP-MCNC: NEGATIVE MG/DL
KETONES UR QL: NEGATIVE
LEUKOCYTE EST, POC: NEGATIVE
NITRITE UR-MCNC: NEGATIVE MG/ML
PH UR: 6.5 [PH] (ref 5–8)
PROT UR STRIP-MCNC: NEGATIVE MG/DL
RBC # UR STRIP: NEGATIVE /UL
SP GR UR: 1.02 (ref 1–1.03)
UROBILINOGEN UR QL: NORMAL

## 2022-10-26 PROCEDURE — 99213 OFFICE O/P EST LOW 20 MIN: CPT | Performed by: OBSTETRICS & GYNECOLOGY

## 2022-10-26 RX ORDER — ONDANSETRON 2 MG/ML
4 INJECTION INTRAMUSCULAR; INTRAVENOUS EVERY 6 HOURS PRN
Status: CANCELLED | OUTPATIENT
Start: 2022-10-26

## 2022-10-26 RX ORDER — CARBOPROST TROMETHAMINE 250 UG/ML
250 INJECTION, SOLUTION INTRAMUSCULAR
Status: CANCELLED | OUTPATIENT
Start: 2022-10-26

## 2022-10-26 RX ORDER — SODIUM CHLORIDE 0.9 % (FLUSH) 0.9 %
10 SYRINGE (ML) INJECTION EVERY 12 HOURS SCHEDULED
Status: CANCELLED | OUTPATIENT
Start: 2022-10-26

## 2022-10-26 RX ORDER — METHYLERGONOVINE MALEATE 0.2 MG/ML
200 INJECTION INTRAVENOUS ONCE AS NEEDED
Status: CANCELLED | OUTPATIENT
Start: 2022-10-26

## 2022-10-26 RX ORDER — MAGNESIUM CARB/ALUMINUM HYDROX 105-160MG
30 TABLET,CHEWABLE ORAL ONCE
Status: CANCELLED | OUTPATIENT
Start: 2022-10-26 | End: 2022-10-26

## 2022-10-26 RX ORDER — OXYTOCIN 10 [USP'U]/ML
10 INJECTION, SOLUTION INTRAMUSCULAR; INTRAVENOUS ONCE
Status: CANCELLED | OUTPATIENT
Start: 2022-10-26 | End: 2022-10-26

## 2022-10-26 RX ORDER — FAMOTIDINE 10 MG/ML
20 INJECTION, SOLUTION INTRAVENOUS EVERY 12 HOURS SCHEDULED
Status: CANCELLED | OUTPATIENT
Start: 2022-10-26

## 2022-10-26 RX ORDER — TERBUTALINE SULFATE 1 MG/ML
0.25 INJECTION, SOLUTION SUBCUTANEOUS AS NEEDED
Status: CANCELLED | OUTPATIENT
Start: 2022-10-26

## 2022-10-26 RX ORDER — ACETAMINOPHEN 325 MG/1
650 TABLET ORAL EVERY 4 HOURS PRN
Status: CANCELLED | OUTPATIENT
Start: 2022-10-26

## 2022-10-26 RX ORDER — FAMOTIDINE 20 MG/1
20 TABLET, FILM COATED ORAL EVERY 12 HOURS SCHEDULED
Status: CANCELLED | OUTPATIENT
Start: 2022-10-26

## 2022-10-26 RX ORDER — SODIUM CHLORIDE 0.9 % (FLUSH) 0.9 %
10 SYRINGE (ML) INJECTION AS NEEDED
Status: CANCELLED | OUTPATIENT
Start: 2022-10-26

## 2022-10-26 RX ORDER — ONDANSETRON 4 MG/1
4 TABLET, FILM COATED ORAL EVERY 6 HOURS PRN
Status: CANCELLED | OUTPATIENT
Start: 2022-10-26

## 2022-10-26 RX ORDER — LIDOCAINE HYDROCHLORIDE 10 MG/ML
5 INJECTION, SOLUTION EPIDURAL; INFILTRATION; INTRACAUDAL; PERINEURAL AS NEEDED
Status: CANCELLED | OUTPATIENT
Start: 2022-10-26

## 2022-10-26 RX ORDER — IBUPROFEN 600 MG/1
600 TABLET ORAL EVERY 6 HOURS PRN
Status: CANCELLED | OUTPATIENT
Start: 2022-10-26

## 2022-10-26 RX ORDER — SODIUM CHLORIDE, SODIUM LACTATE, POTASSIUM CHLORIDE, CALCIUM CHLORIDE 600; 310; 30; 20 MG/100ML; MG/100ML; MG/100ML; MG/100ML
125 INJECTION, SOLUTION INTRAVENOUS CONTINUOUS
Status: CANCELLED | OUTPATIENT
Start: 2022-10-26

## 2022-10-26 RX ORDER — MISOPROSTOL 200 UG/1
800 TABLET ORAL ONCE AS NEEDED
Status: CANCELLED | OUTPATIENT
Start: 2022-10-26

## 2022-10-26 NOTE — PROGRESS NOTES
Chief Complaint   Patient presents with   • Routine Prenatal Visit     Cc: ob check and repeat  Us - LGA - GBS +  - good FM - no ob complaints - wants cervical exam today - declines flu vaccine        HPI: 22 y.o.  at 38w5d presents for prenatal care     Vitals:    10/26/22 0918   BP: 120/70   Weight: 83.9 kg (185 lb)     Total weight gain for pregnancy:  28.1 kg (62 lb)    Review of systems:   Gen: negative  CV:     negative  GI: negative  :   negative and good fetal movement noted   MS:    negative  Neuro: negative  Pul: negative    A/P  1. Intrauterine pregnancy at 38w5d   2. Pregnancy Risk:  HIGH RISK        Diagnoses and all orders for this visit:    1. Encounter for prenatal care in third trimester of first pregnancy (Primary)    2. Screening for blood or protein in urine  -     POC Urinalysis Dipstick    3. Rh negative, antepartum  -     Labor Induction; Future    4. Excessive fetal growth affecting management of pregnancy in third trimester, single or unspecified fetus  -     Labor Induction; Future    5. GBS (group B Streptococcus carrier), +RV culture, currently pregnant  -     Labor Induction; Future    6. Maternal anemia in pregnancy, antepartum  -     Labor Induction; Future        Routine labor warnings were discussed and indications for L & D f/u including bleeding, regular contractions, decreased fetal movement or/and rupture of membranes.   -----------------------  PLAN:   Return in about 5 days (around 10/31/2022), or ob check.  Desires IOL - scheduled   Anemia- cont Iron   GBS Positive   LGA - BPP        Cristy Metz MD  10/26/2022 10:03 EDT

## 2022-10-31 ENCOUNTER — ROUTINE PRENATAL (OUTPATIENT)
Dept: OBSTETRICS AND GYNECOLOGY | Age: 22
End: 2022-10-31

## 2022-10-31 VITALS — SYSTOLIC BLOOD PRESSURE: 122 MMHG | BODY MASS INDEX: 31.28 KG/M2 | WEIGHT: 188 LBS | DIASTOLIC BLOOD PRESSURE: 74 MMHG

## 2022-10-31 DIAGNOSIS — O26.00 EXCESSIVE WEIGHT GAIN AFFECTING PREGNANCY: ICD-10-CM

## 2022-10-31 DIAGNOSIS — Z13.89 SCREENING FOR BLOOD OR PROTEIN IN URINE: ICD-10-CM

## 2022-10-31 DIAGNOSIS — O26.899 RH NEGATIVE, ANTEPARTUM: ICD-10-CM

## 2022-10-31 DIAGNOSIS — Z67.91 RH NEGATIVE, ANTEPARTUM: ICD-10-CM

## 2022-10-31 DIAGNOSIS — Z34.03 ENCOUNTER FOR PRENATAL CARE IN THIRD TRIMESTER OF FIRST PREGNANCY: Primary | ICD-10-CM

## 2022-10-31 DIAGNOSIS — O36.63X0 EXCESSIVE FETAL GROWTH AFFECTING MANAGEMENT OF PREGNANCY IN THIRD TRIMESTER, SINGLE OR UNSPECIFIED FETUS: ICD-10-CM

## 2022-10-31 DIAGNOSIS — O99.019 MATERNAL ANEMIA IN PREGNANCY, ANTEPARTUM: ICD-10-CM

## 2022-10-31 DIAGNOSIS — O99.820 GBS (GROUP B STREPTOCOCCUS CARRIER), +RV CULTURE, CURRENTLY PREGNANT: ICD-10-CM

## 2022-10-31 LAB
BILIRUB BLD-MCNC: NEGATIVE MG/DL
GLUCOSE UR STRIP-MCNC: NEGATIVE MG/DL
KETONES UR QL: NEGATIVE
LEUKOCYTE EST, POC: ABNORMAL
NITRITE UR-MCNC: NEGATIVE MG/ML
PH UR: 7 [PH] (ref 5–8)
PROT UR STRIP-MCNC: NEGATIVE MG/DL
RBC # UR STRIP: NEGATIVE /UL
SP GR UR: 1.02 (ref 1–1.03)
UROBILINOGEN UR QL: NORMAL

## 2022-10-31 PROCEDURE — 99213 OFFICE O/P EST LOW 20 MIN: CPT | Performed by: OBSTETRICS & GYNECOLOGY

## 2022-10-31 NOTE — PROGRESS NOTES
Chief Complaint   Patient presents with   • Routine Prenatal Visit     Cc: ob check-  GBS + , good FM ,  IOL - scheduled , reports good FM - denies any vag bleeding or fluid loss -        HPI: 22 y.o.  at 39w3d presents for prenatal care      Vitals:    10/31/22 1016   BP: 122/74   Weight: 85.3 kg (188 lb)     Total weight gain for pregnancy:  29.5 kg (65 lb)    Review of systems:   Gen: negative  CV:     negative  GI: negative  :   negative and good fetal movement noted   MS:    negative  Neuro: negative  Pul: negative    A/P  1. Intrauterine pregnancy at 39w3d   2. Pregnancy Risk:  HIGH RISK        Diagnoses and all orders for this visit:    1. Encounter for prenatal care in third trimester of first pregnancy (Primary)    2. Screening for blood or protein in urine  -     POC Urinalysis Dipstick    3. Excessive fetal growth affecting management of pregnancy in third trimester, single or unspecified fetus    4. Rh negative, antepartum    5. GBS (group B Streptococcus carrier), +RV culture, currently pregnant    6. Excessive weight gain affecting pregnancy    7. Maternal anemia in pregnancy, antepartum        Routine labor warnings were discussed and indications for L & D f/u including bleeding, regular contractions, decreased fetal movement or/and rupture of membranes.   -----------------------  PLAN:   Return in about 4 weeks (around 2022), or PP visit with me.  GBS + - abx in labor   LGA - IOL tomorrow  Anemia- cont Iron   Labor warnings/FKC   RH neg - s.p Rhogam      Cristy Metz MD  10/31/2022 10:50 EDT

## 2022-11-01 ENCOUNTER — HOSPITAL ENCOUNTER (OUTPATIENT)
Dept: LABOR AND DELIVERY | Facility: HOSPITAL | Age: 22
Discharge: HOME | End: 2022-11-01

## 2022-11-01 ENCOUNTER — HOSPITAL ENCOUNTER (INPATIENT)
Facility: HOSPITAL | Age: 22
LOS: 4 days | Discharge: HOME | End: 2022-11-05
Attending: OBSTETRICS & GYNECOLOGY | Admitting: OBSTETRICS & GYNECOLOGY

## 2022-11-01 ENCOUNTER — ANESTHESIA (OUTPATIENT)
Dept: LABOR AND DELIVERY | Facility: HOSPITAL | Age: 22
End: 2022-11-01

## 2022-11-01 ENCOUNTER — ANESTHESIA EVENT (OUTPATIENT)
Dept: LABOR AND DELIVERY | Facility: HOSPITAL | Age: 22
End: 2022-11-01

## 2022-11-01 DIAGNOSIS — O36.63X0 EXCESSIVE FETAL GROWTH AFFECTING MANAGEMENT OF PREGNANCY IN THIRD TRIMESTER, SINGLE OR UNSPECIFIED FETUS: ICD-10-CM

## 2022-11-01 LAB
ABO GROUP BLD: NORMAL
BASOPHILS # BLD AUTO: 0.02 10*3/MM3 (ref 0–0.2)
BASOPHILS NFR BLD AUTO: 0.2 % (ref 0–1.5)
BLD GP AB SCN SERPL QL: NEGATIVE
DEPRECATED RDW RBC AUTO: 52.1 FL (ref 37–54)
EOSINOPHIL # BLD AUTO: 0.07 10*3/MM3 (ref 0–0.4)
EOSINOPHIL NFR BLD AUTO: 0.8 % (ref 0.3–6.2)
ERYTHROCYTE [DISTWIDTH] IN BLOOD BY AUTOMATED COUNT: 16.2 % (ref 12.3–15.4)
HCT VFR BLD AUTO: 32.1 % (ref 34–46.6)
HGB BLD-MCNC: 10.9 G/DL (ref 12–15.9)
IMM GRANULOCYTES # BLD AUTO: 0.02 10*3/MM3 (ref 0–0.05)
IMM GRANULOCYTES NFR BLD AUTO: 0.2 % (ref 0–0.5)
LYMPHOCYTES # BLD AUTO: 1.51 10*3/MM3 (ref 0.7–3.1)
LYMPHOCYTES NFR BLD AUTO: 17.5 % (ref 19.6–45.3)
MCH RBC QN AUTO: 29.7 PG (ref 26.6–33)
MCHC RBC AUTO-ENTMCNC: 34 G/DL (ref 31.5–35.7)
MCV RBC AUTO: 87.5 FL (ref 79–97)
MONOCYTES # BLD AUTO: 0.96 10*3/MM3 (ref 0.1–0.9)
MONOCYTES NFR BLD AUTO: 11.1 % (ref 5–12)
NEUTROPHILS NFR BLD AUTO: 6.05 10*3/MM3 (ref 1.7–7)
NEUTROPHILS NFR BLD AUTO: 70.2 % (ref 42.7–76)
NRBC BLD AUTO-RTO: 0.1 /100 WBC (ref 0–0.2)
PLATELET # BLD AUTO: 167 10*3/MM3 (ref 140–450)
PMV BLD AUTO: 11.2 FL (ref 6–12)
RBC # BLD AUTO: 3.67 10*6/MM3 (ref 3.77–5.28)
RH BLD: NEGATIVE
T&S EXPIRATION DATE: NORMAL
WBC NRBC COR # BLD: 8.63 10*3/MM3 (ref 3.4–10.8)

## 2022-11-01 PROCEDURE — 86900 BLOOD TYPING SEROLOGIC ABO: CPT | Performed by: OBSTETRICS & GYNECOLOGY

## 2022-11-01 PROCEDURE — 85025 COMPLETE CBC W/AUTO DIFF WBC: CPT | Performed by: OBSTETRICS & GYNECOLOGY

## 2022-11-01 PROCEDURE — 86850 RBC ANTIBODY SCREEN: CPT | Performed by: OBSTETRICS & GYNECOLOGY

## 2022-11-01 PROCEDURE — 86901 BLOOD TYPING SEROLOGIC RH(D): CPT | Performed by: OBSTETRICS & GYNECOLOGY

## 2022-11-01 RX ORDER — FAMOTIDINE 20 MG/1
20 TABLET, FILM COATED ORAL EVERY 12 HOURS SCHEDULED
Status: DISCONTINUED | OUTPATIENT
Start: 2022-11-01 | End: 2022-11-03

## 2022-11-01 RX ORDER — MAGNESIUM CARB/ALUMINUM HYDROX 105-160MG
30 TABLET,CHEWABLE ORAL ONCE
Status: DISCONTINUED | OUTPATIENT
Start: 2022-11-01 | End: 2022-11-03

## 2022-11-01 RX ORDER — SODIUM CHLORIDE 0.9 % (FLUSH) 0.9 %
10 SYRINGE (ML) INJECTION EVERY 12 HOURS SCHEDULED
Status: DISCONTINUED | OUTPATIENT
Start: 2022-11-01 | End: 2022-11-03

## 2022-11-01 RX ORDER — FENTANYL CIT 0.2 MG/100ML-ROPIV 0.2%-NACL 0.9% EPIDURAL INJ 2/0.2 MCG/ML-%
8 SOLUTION INJECTION CONTINUOUS
Status: DISCONTINUED | OUTPATIENT
Start: 2022-11-01 | End: 2022-11-03

## 2022-11-01 RX ORDER — FAMOTIDINE 10 MG/ML
20 INJECTION, SOLUTION INTRAVENOUS EVERY 12 HOURS SCHEDULED
Status: DISCONTINUED | OUTPATIENT
Start: 2022-11-01 | End: 2022-11-03

## 2022-11-01 RX ORDER — ONDANSETRON 4 MG/1
4 TABLET, FILM COATED ORAL EVERY 6 HOURS PRN
Status: DISCONTINUED | OUTPATIENT
Start: 2022-11-01 | End: 2022-11-03

## 2022-11-01 RX ORDER — TERBUTALINE SULFATE 1 MG/ML
0.25 INJECTION, SOLUTION SUBCUTANEOUS AS NEEDED
Status: DISCONTINUED | OUTPATIENT
Start: 2022-11-01 | End: 2022-11-03

## 2022-11-01 RX ORDER — SODIUM CHLORIDE, SODIUM LACTATE, POTASSIUM CHLORIDE, CALCIUM CHLORIDE 600; 310; 30; 20 MG/100ML; MG/100ML; MG/100ML; MG/100ML
125 INJECTION, SOLUTION INTRAVENOUS CONTINUOUS
Status: DISCONTINUED | OUTPATIENT
Start: 2022-11-01 | End: 2022-11-03

## 2022-11-01 RX ORDER — FAMOTIDINE 10 MG/ML
20 INJECTION, SOLUTION INTRAVENOUS ONCE AS NEEDED
Status: COMPLETED | OUTPATIENT
Start: 2022-11-01 | End: 2022-11-03

## 2022-11-01 RX ORDER — TRISODIUM CITRATE DIHYDRATE AND CITRIC ACID MONOHYDRATE 500; 334 MG/5ML; MG/5ML
30 SOLUTION ORAL ONCE
Status: DISCONTINUED | OUTPATIENT
Start: 2022-11-01 | End: 2022-11-03

## 2022-11-01 RX ORDER — ONDANSETRON 2 MG/ML
4 INJECTION INTRAMUSCULAR; INTRAVENOUS ONCE AS NEEDED
Status: DISCONTINUED | OUTPATIENT
Start: 2022-11-01 | End: 2022-11-03

## 2022-11-01 RX ORDER — ACETAMINOPHEN 325 MG/1
650 TABLET ORAL EVERY 4 HOURS PRN
Status: DISCONTINUED | OUTPATIENT
Start: 2022-11-01 | End: 2022-11-03

## 2022-11-01 RX ORDER — LIDOCAINE HYDROCHLORIDE 10 MG/ML
5 INJECTION, SOLUTION EPIDURAL; INFILTRATION; INTRACAUDAL; PERINEURAL AS NEEDED
Status: DISCONTINUED | OUTPATIENT
Start: 2022-11-01 | End: 2022-11-03

## 2022-11-01 RX ORDER — EPHEDRINE SULFATE 50 MG/ML
5 INJECTION, SOLUTION INTRAVENOUS
Status: DISCONTINUED | OUTPATIENT
Start: 2022-11-01 | End: 2022-11-03

## 2022-11-01 RX ORDER — SODIUM CHLORIDE 0.9 % (FLUSH) 0.9 %
10 SYRINGE (ML) INJECTION AS NEEDED
Status: DISCONTINUED | OUTPATIENT
Start: 2022-11-01 | End: 2022-11-03

## 2022-11-01 RX ORDER — ONDANSETRON 2 MG/ML
4 INJECTION INTRAMUSCULAR; INTRAVENOUS EVERY 6 HOURS PRN
Status: DISCONTINUED | OUTPATIENT
Start: 2022-11-01 | End: 2022-11-03

## 2022-11-01 RX ORDER — BUTORPHANOL TARTRATE 1 MG/ML
1 INJECTION, SOLUTION INTRAMUSCULAR; INTRAVENOUS
Status: DISCONTINUED | OUTPATIENT
Start: 2022-11-01 | End: 2022-11-03

## 2022-11-01 RX ADMIN — SODIUM CHLORIDE, POTASSIUM CHLORIDE, SODIUM LACTATE AND CALCIUM CHLORIDE 125 ML/HR: 600; 310; 30; 20 INJECTION, SOLUTION INTRAVENOUS at 20:15

## 2022-11-01 RX ADMIN — DINOPROSTONE 10 MG: 10 INSERT VAGINAL at 21:12

## 2022-11-01 RX ADMIN — Medication 10 ML: at 20:14

## 2022-11-02 PROCEDURE — 3E033VJ INTRODUCTION OF OTHER HORMONE INTO PERIPHERAL VEIN, PERCUTANEOUS APPROACH: ICD-10-PCS | Performed by: OBSTETRICS & GYNECOLOGY

## 2022-11-02 PROCEDURE — 25010000002 BUTORPHANOL PER 1 MG: Performed by: OBSTETRICS & GYNECOLOGY

## 2022-11-02 PROCEDURE — 25010000002 ONDANSETRON PER 1 MG: Performed by: OBSTETRICS & GYNECOLOGY

## 2022-11-02 PROCEDURE — 25010000002 ROPIVACAINE PER 1 MG: Performed by: ANESTHESIOLOGY

## 2022-11-02 PROCEDURE — 25010000002 PENICILLIN G POTASSIUM PER 600000 UNITS: Performed by: OBSTETRICS & GYNECOLOGY

## 2022-11-02 PROCEDURE — C1755 CATHETER, INTRASPINAL: HCPCS | Performed by: ANESTHESIOLOGY

## 2022-11-02 RX ORDER — OXYTOCIN/0.9 % SODIUM CHLORIDE 30/500 ML
2-20 PLASTIC BAG, INJECTION (ML) INTRAVENOUS
Status: DISCONTINUED | OUTPATIENT
Start: 2022-11-02 | End: 2022-11-03

## 2022-11-02 RX ORDER — LIDOCAINE HYDROCHLORIDE AND EPINEPHRINE 15; 5 MG/ML; UG/ML
INJECTION, SOLUTION EPIDURAL AS NEEDED
Status: DISCONTINUED | OUTPATIENT
Start: 2022-11-02 | End: 2022-11-03 | Stop reason: SURG

## 2022-11-02 RX ORDER — PENICILLIN G 3000000 [IU]/50ML
3 INJECTION, SOLUTION INTRAVENOUS EVERY 4 HOURS
Status: DISCONTINUED | OUTPATIENT
Start: 2022-11-02 | End: 2022-11-03

## 2022-11-02 RX ORDER — ROPIVACAINE HYDROCHLORIDE 2 MG/ML
INJECTION, SOLUTION EPIDURAL; INFILTRATION; PERINEURAL AS NEEDED
Status: DISCONTINUED | OUTPATIENT
Start: 2022-11-02 | End: 2022-11-03 | Stop reason: SURG

## 2022-11-02 RX ADMIN — Medication 2 MILLI-UNITS/MIN: at 11:58

## 2022-11-02 RX ADMIN — Medication 9 ML/HR: at 10:27

## 2022-11-02 RX ADMIN — PENICILLIN G 3 MILLION UNITS: 3000000 INJECTION, SOLUTION INTRAVENOUS at 13:08

## 2022-11-02 RX ADMIN — BUTORPHANOL TARTRATE 2 MG: 2 INJECTION, SOLUTION INTRAMUSCULAR; INTRAVENOUS at 05:41

## 2022-11-02 RX ADMIN — ROPIVACAINE HYDROCHLORIDE 5 MG: 2 INJECTION, SOLUTION EPIDURAL; INFILTRATION at 10:27

## 2022-11-02 RX ADMIN — PENICILLIN G POTASSIUM 5 MILLION UNITS: 5000000 INJECTION, POWDER, FOR SOLUTION INTRAMUSCULAR; INTRAVENOUS at 09:05

## 2022-11-02 RX ADMIN — PENICILLIN G 3 MILLION UNITS: 3000000 INJECTION, SOLUTION INTRAVENOUS at 17:07

## 2022-11-02 RX ADMIN — SODIUM CHLORIDE, POTASSIUM CHLORIDE, SODIUM LACTATE AND CALCIUM CHLORIDE 125 ML/HR: 600; 310; 30; 20 INJECTION, SOLUTION INTRAVENOUS at 06:30

## 2022-11-02 RX ADMIN — LIDOCAINE HYDROCHLORIDE AND EPINEPHRINE 2.5 ML: 15; 5 INJECTION, SOLUTION EPIDURAL at 10:19

## 2022-11-02 RX ADMIN — ONDANSETRON 4 MG: 2 INJECTION INTRAMUSCULAR; INTRAVENOUS at 14:32

## 2022-11-02 RX ADMIN — SODIUM CHLORIDE, POTASSIUM CHLORIDE, SODIUM LACTATE AND CALCIUM CHLORIDE 125 ML/HR: 600; 310; 30; 20 INJECTION, SOLUTION INTRAVENOUS at 16:37

## 2022-11-02 RX ADMIN — BUTORPHANOL TARTRATE 2 MG: 2 INJECTION, SOLUTION INTRAMUSCULAR; INTRAVENOUS at 02:48

## 2022-11-02 RX ADMIN — SODIUM CHLORIDE, POTASSIUM CHLORIDE, SODIUM LACTATE AND CALCIUM CHLORIDE 1000 ML: 600; 310; 30; 20 INJECTION, SOLUTION INTRAVENOUS at 10:05

## 2022-11-02 RX ADMIN — Medication 8 ML/HR: at 19:52

## 2022-11-02 RX ADMIN — ROPIVACAINE HYDROCHLORIDE 4 MG: 2 INJECTION, SOLUTION EPIDURAL; INFILTRATION at 10:22

## 2022-11-02 RX ADMIN — PENICILLIN G 3 MILLION UNITS: 3000000 INJECTION, SOLUTION INTRAVENOUS at 21:12

## 2022-11-02 RX ADMIN — SODIUM CHLORIDE, POTASSIUM CHLORIDE, SODIUM LACTATE AND CALCIUM CHLORIDE 125 ML/HR: 600; 310; 30; 20 INJECTION, SOLUTION INTRAVENOUS at 03:38

## 2022-11-02 NOTE — ANESTHESIA PROCEDURE NOTES
Labor Epidural      Patient reassessed immediately prior to procedure    Patient location during procedure: OB  Indication:at surgeon's request  Performed By  Anesthesiologist: Prince Greco MD  Preanesthetic Checklist  Completed: patient identified, IV checked, site marked, risks and benefits discussed, surgical consent, monitors and equipment checked, pre-op evaluation and timeout performed  Prep:  Pt Position:sitting  Sterile Tech:cap, gloves, mask and sterile barrier  Prep:chlorhexidine gluconate and isopropyl alcohol  Monitoring:blood pressure monitoring and EKG  Epidural Block Procedure:  Approach:midline  Guidance:landmark technique  Location:L2-L3  Needle Type:Tuohy  Needle Gauge:17  Loss of Resistance Medium: air  Cath Depth at skin:12 cm  Paresthesia: none  Aspiration:negative  Test Dose:negative  Number of Attempts: 1  Post Assessment:  Dressing:occlusive dressing applied, secured with tape and biopatch applied  Pt Tolerance:patient tolerated the procedure well with no apparent complications  Complications:no

## 2022-11-02 NOTE — PLAN OF CARE
Problem: Adult Inpatient Plan of Care  Goal: Plan of Care Review  Outcome: Ongoing, Progressing  Flowsheets (Taken 11/2/2022 1859)  Progress: improving  Plan of Care Reviewed With: patient  Outcome Evaluation: VSS stable, pain well controlled with epidural. Pitocin infusing, MVU's adequate and patient is making cervical change.  Goal: Patient-Specific Goal (Individualized)  Outcome: Ongoing, Progressing  Goal: Absence of Hospital-Acquired Illness or Injury  Outcome: Ongoing, Progressing  Intervention: Identify and Manage Fall Risk  Recent Flowsheet Documentation  Taken 11/2/2022 1037 by Yumiko Plascencia RN  Safety Promotion/Fall Prevention: safety round/check completed  Taken 11/2/2022 0737 by Yumiko Plascencia RN  Safety Promotion/Fall Prevention: safety round/check completed  Goal: Optimal Comfort and Wellbeing  Outcome: Ongoing, Progressing  Intervention: Provide Person-Centered Care  Recent Flowsheet Documentation  Taken 11/2/2022 0737 by Yumiko Plascencia RN  Trust Relationship/Rapport:   choices provided   care explained   emotional support provided   empathic listening provided   questions encouraged   questions answered   reassurance provided   thoughts/feelings acknowledged  Goal: Readiness for Transition of Care  Outcome: Ongoing, Progressing   Goal Outcome Evaluation:  Plan of Care Reviewed With: patient        Progress: improving  Outcome Evaluation: VSS stable, pain well controlled with epidural. Pitocin infusing, MVU's adequate and patient is making cervical change.

## 2022-11-02 NOTE — PLAN OF CARE
Problem: Adult Inpatient Plan of Care  Goal: Plan of Care Review  Outcome: Ongoing, Progressing  Flowsheets (Taken 11/2/2022 0337)  Progress: improving  Plan of Care Reviewed With: patient  Outcome Evaluation: VSS stable, pain well controlled with stadol. Cervidil in place until 0912 am then pt able to eat and shower.     Problem: Adult Inpatient Plan of Care  Goal: Patient-Specific Goal (Individualized)  Outcome: Ongoing, Progressing  Flowsheets (Taken 11/2/2022 0337)  Patient-Specific Goals (Include Timeframe): healthy mom and baby  Individualized Care Needs: Pain control  Anxieties, Fears or Concerns: First baby.   Goal Outcome Evaluation:  Plan of Care Reviewed With: patient        Progress: improving  Outcome Evaluation: VSS stable, pain well controlled with stadol. Cervidil in place until 0912 am then pt able to eat and shower.

## 2022-11-02 NOTE — H&P
[facility]  Obstetric History and Physical    Chief Complaint   Patient presents with   • Scheduled Induction     TERM IOL-Cervidil       Subjective     Patient is a 22 y.o. female  currently at 39w5d, who presents for scheduled elective induction of labor. Received cervidil overnight and currently on pitocin. Denies loss of fluid or vaginal bleeding. Reports good fetal movement.     Her prenatal care is complicated by  GBS + and abnormal fetal growth  large for gestational age.  Her previous obstetric/gynecological history is noted for is non-contributory.    The following portions of the patients history were reviewed and updated as appropriate: current medications, allergies, past medical history, past surgical history, past family history, past social history and ob hx .       Prenatal Information:  Prenatal Results     POC Urine Glucose/Protein     Test Value Reference Range Date Time    Urine Glucose  Negative mg/dL Negative 10/31/22 1019    Urine Protein  Negative mg/dL Negative 10/31/22 1019          Initial Prenatal Labs     Test Value Reference Range Date Time    Hemoglobin ^ 12.0 g/dL 12.0 - 16.0 22 1346       13.0 g/dL 11.1 - 15.9 22 1501    Hematocrit ^ 35.1 % 36.0 - 46.0 22 1346       38.0 % 34.0 - 46.6 22 1501    Platelets ^ 272 10*3/uL 140 - 440 22 1346       261 x10E3/uL 150 - 450 22 1501    Rubella IgG  16.90 index Immune >0.99 22 1501    Hepatitis B SAg  Negative  Negative 22 1501    Hepatitis C Ab  <0.1 s/co ratio 0.0 - 0.9 22 1501    RPR  Non Reactive  Non Reactive 22 1501    ABO  A   22    Rh  Negative   22    Antibody Screen  Negative  Negative 22 1501    HIV  Non Reactive  Non Reactive 22 1501    Urine Culture  Final report   22 1501    Gonorrhea  Negative  Negative 09/15/22 1051       Negative  Negative 22 1501    Chlamydia  Negative  Negative 09/15/22 1051       Negative   Negative 03/16/22 1501    TSH        HgB A1c               2nd and 3rd Trimester     Test Value Reference Range Date Time    Hemoglobin (repeated)  10.9 g/dL 12.0 - 15.9 11/01/22 2014       10.7 g/dL 12.0 - 15.9 09/23/22 1345       9.3 g/dL 11.1 - 15.9 08/08/22 1208    Hematocrit (repeated)  32.1 % 34.0 - 46.6 11/01/22 2014       32.0 % 34.0 - 46.6 09/23/22 1345       28.2 % 34.0 - 46.6 08/08/22 1208    Platelets   167 10*3/mm3 140 - 450 11/01/22 2014       186 10*3/mm3 140 - 450 09/23/22 1345      ^ 272 10*3/uL 140 - 440 04/11/22 1346       261 x10E3/uL 150 - 450 03/16/22 1501    GCT  115 mg/dL 65 - 139 08/08/22 1208    Antibody Screen (repeated)  Negative   11/01/22 2014       Negative  Negative 08/08/22 1208    GTT Fasting        GTT 1 Hr        GTT 2 Hr        GTT 3 Hr        Group B Strep  Positive  Negative 10/10/22 1000          Drug Screening     Test Value Reference Range Date Time    Amphetamine Screen  Negative ng/mL Mcjtxq=8532 03/16/22 1501    Barbiturate Screen  Negative ng/mL Lssybk=690 03/16/22 1501    Benzodiazepine Screen  Negative ng/mL Shigol=927 03/16/22 1501    Methadone Screen  Negative ng/mL Najufu=161 03/16/22 1501    Phencyclidine Screen  Negative ng/mL Cutoff=25 03/16/22 1501    Opiates Screen  Negative ng/mL Mpgpmo=345 03/16/22 1501    THC Screen  Positive  Cutoff=50 03/16/22 1501    Cocaine Screen  Negative ng/mL Ayxppm=826 03/16/22 1501    Propoxyphene Screen  Negative ng/mL Dlvndn=853 03/16/22 1501    Buprenorphine Screen        Methamphetamine Screen        Oxycodone Screen        Tricyclic Antidepressants Screen              Other (Risk screening)     Test Value Reference Range Date Time    Varicella IgG  805 index Immune >165 03/16/22 1501    Parvovirus IgG        CMV IgG        Cystic Fibrosis        Hemoglobin electrophoresis        NIPT        MSAFP-4        AFP (for NTD only)              Legend    ^: Historical                      External Prenatal Results     Pregnancy  Outside Results - Transcribed From Office Records - See Scanned Records For Details     Test Value Date Time    ABO  A  11/01/22 2014    Rh  Negative  11/01/22 2014    Antibody Screen  Negative  11/01/22 2014       Negative  08/08/22 1208       Negative  03/16/22 1501    Varicella IgG  805 index 03/16/22 1501    Rubella  16.90 index 03/16/22 1501    Hgb  10.9 g/dL 11/01/22 2014       10.7 g/dL 09/23/22 1345       9.3 g/dL 08/08/22 1208      ^ 12.0 g/dL 04/11/22 1346       13.0 g/dL 03/16/22 1501    Hct  32.1 % 11/01/22 2014       32.0 % 09/23/22 1345       28.2 % 08/08/22 1208      ^ 35.1 % 04/11/22 1346       38.0 % 03/16/22 1501    Glucose Fasting GTT       Glucose Tolerance Test 1 hour       Glucose Tolerance Test 3 hour       Gonorrhea (discrete)  Negative  09/15/22 1051       Negative  03/16/22 1501    Chlamydia (discrete)  Negative  09/15/22 1051       Negative  03/16/22 1501    RPR  Non Reactive  03/16/22 1501    VDRL       Syphilis Antibody       HBsAg  Negative  03/16/22 1501    Herpes Simplex Virus PCR       Herpes Simplex VIrus Culture       HIV  Non Reactive  03/16/22 1501    Hep C RNA Quant PCR       Hep C Antibody  <0.1 s/co ratio 03/16/22 1501    AFP       Group B Strep  Positive  10/10/22 1000    GBS Susceptibility to Clindamycin       GBS Susceptibility to Erythromycin       Fetal Fibronectin       Genetic Testing, Maternal Blood             Drug Screening     Test Value Date Time    Urine Drug Screen       Amphetamine Screen  Negative ng/mL 03/16/22 1501    Barbiturate Screen  Negative ng/mL 03/16/22 1501    Benzodiazepine Screen  Negative ng/mL 03/16/22 1501    Methadone Screen  Negative ng/mL 03/16/22 1501    Phencyclidine Screen  Negative ng/mL 03/16/22 1501    Opiates Screen       THC Screen       Cocaine Screen       Propoxyphene Screen  Negative ng/mL 03/16/22 1501    Buprenorphine Screen       Methamphetamine Screen       Oxycodone Screen       Tricyclic Antidepressants Screen              Legend    ^: Historical                         Past OB History:     OB History    Para Term  AB Living   1 0 0 0 0 0   SAB IAB Ectopic Molar Multiple Live Births   0 0 0 0 0 0      # Outcome Date GA Lbr Judson/2nd Weight Sex Delivery Anes PTL Lv   1 Current               Obstetric Comments   3/16/22 - IUP at 6-5 weeks - BIPIN 22 - [facility]    6/10/22 - 19-0  weeksNormal completed anatomy - [facility]    22 - 27-3 weeks - EFW 95%, AC 95% - [facility]    22 - 34-0 weeks - EFW 98%, AC >99% - [facility]    10/21/22 - 38-0 weeks - EFW 98%, AC 89% - [facility]        Past Medical History: Past Medical History:   Diagnosis Date   • Trichomoniasis 2020      Past Surgical History History reviewed. No pertinent surgical history.   Family History: Family History   Problem Relation Age of Onset   • Bipolar disorder Mother    • Hypertension Paternal Grandmother    • Diabetes Paternal Grandmother    • Breast cancer Neg Hx    • Ovarian cancer Neg Hx    • Uterine cancer Neg Hx    • Colon cancer Neg Hx       Social History:  reports that she has never smoked. She has never used smokeless tobacco.   reports no history of alcohol use.   reports no history of drug use.        Review of Systems   Constitutional: Negative for chills, fatigue and fever.   Gastrointestinal: Negative for abdominal distention and abdominal pain.   Genitourinary: Negative for dysuria, pelvic pain, vaginal bleeding, vaginal discharge and vaginal pain.         Objective     Vital Signs Range for the last 24 hours  Temperature: Temp:  [97.8 °F (36.6 °C)-98.6 °F (37 °C)] 98.6 °F (37 °C)   Temp Source: Temp src: Oral   BP: BP: ()/(48-79) 124/74   Pulse: Heart Rate:  [] 90   Respirations: Resp:  [16] 16   SPO2: SpO2:  [98 %] 98 %   O2 Amount (l/min):     O2 Devices Device (Oxygen Therapy): room air   Weight: Weight:  [84.4 kg (186 lb)] 84.4 kg (186 lb)     Physical Examination: General appearance - alert, well appearing, and in no distress  Abdomen -  gravid, soft, nontender, nondistended, no masses or organomegaly  Musculoskeletal - no joint tenderness, deformity or swelling  Extremities - peripheral pulses normal, no pedal edema, no clubbing or cyanosis  Skin - normal coloration and turgor, no rashes, no suspicious skin lesions noted    Presentation: Vertex    Cervix: Exam by:     Dilation: Cervical Dilation (cm): 1   Effacement: Cervical Effacement: 70%   Station:       Fetal Heart Rate Assessment   Method: Fetal HR Assessment Method: external   Beats/min: Fetal HR (beats/min): 130   Baseline: Fetal HR Baseline: normal range   Variability: Fetal HR Variability: moderate (amplitude range 6 to 25 bpm)   Accels: Fetal HR Accelerations: greater than/equal to 15 bpm, lasting at least 15 seconds   Decels: Fetal HR Decelerations: absent   Tracing Category:       Uterine Assessment   Method: Method: external tocotransducer, palpation, per patient report   Frequency (min): Contraction Frequency (Minutes): 1.5-3   Ctx Count in 10 min: Contractions in 10 Minutes: 5-6   Duration:     Intensity: Contraction Intensity: mild by palpation   Intensity by IUPC:     Resting Tone: Uterine Resting Tone: soft by palpation   Resting Tone by IUPC:     [place] Units:       Laboratory Results:   Results from last 7 days   Lab Units 11/01/22 2014   WBC 10*3/mm3 8.63   HEMOGLOBIN g/dL 10.9*   HEMATOCRIT % 32.1*   PLATELETS 10*3/mm3 167           Invalid input(s): LABALBU, PROT        Assessment & Plan       LGA (large for gestational age) fetus affecting management of mother    Rh negative, antepartum    UTI (urinary tract infection) during pregnancy    Maternal anemia in pregnancy, antepartum    Excessive weight gain affecting pregnancy    GBS (group B Streptococcus carrier), +RV culture, currently pregnant      Assessment & Plan    Assessment:  1.  Intrauterine pregnancy at 39w5d gestation with reactive, reassuring fetal status.    2.  induction of labor  for elective  with  favorable cervix  3.  Obstetrical history significant for is non-contributory.  4.  GBS status:   Strep Gp B Culture   Date Value Ref Range Status   10/10/2022 Positive (A) Negative Final     Comment:     Centers for Disease Control and Prevention (CDC) and American Congress  of Obstetricians and Gynecologists (ACOG) guidelines for prevention of   group B streptococcal (GBS) disease specify co-collection of  a vaginal and rectal swab specimen to maximize sensitivity of GBS  detection. Per the CDC and ACOG, swabbing both the lower vagina and  rectum substantially increases the yield of detection compared with  sampling the vagina alone.  Penicillin G, ampicillin, or cefazolin are indicated for intrapartum  prophylaxis of  GBS colonization. Reflex susceptibility  testing should be performed prior to use of clindamycin only on GBS  isolates from penicillin-allergic women who are considered a high risk  for anaphylaxis. Treatment with vancomycin without additional testing  is warranted if resistance to clindamycin is noted.         Plan:  1. fetal and uterine monitoring  continuously, cervical ripening with  Cervidil, labor augmentation  Pitocin, analgesia with  epidural and antibiotic for GBS  2. Plan of care has been reviewed with patient and partner  3.  Risks, benefits of treatment plan have been discussed.  4.  All questions have been answered.        Cristy Metz MD  2022  07:55 EDT

## 2022-11-02 NOTE — ANESTHESIA PREPROCEDURE EVALUATION
Anesthesia Evaluation     Patient summary reviewed and Nursing notes reviewed   no history of anesthetic complications:  NPO Solid Status: > 6 hours  NPO Liquid Status: > 6 hours           Airway   Mallampati: II  TM distance: >3 FB  Neck ROM: full  no difficulty expected and No difficulty expected  Dental - normal exam     Pulmonary - negative pulmonary ROS and normal exam    breath sounds clear to auscultation  (-) rhonchi, decreased breath sounds, wheezes, rales, stridor  Cardiovascular - negative cardio ROS and normal exam    NYHA Classification: I  Rhythm: regular  Rate: normal    (-) murmur, weak pulses, friction rub, systolic click, carotid bruits, JVD, peripheral edema      Neuro/Psych- negative ROS  GI/Hepatic/Renal/Endo - negative ROS     Musculoskeletal (-) negative ROS    Abdominal  - normal exam    Abdomen: soft.   Substance History - negative use     OB/GYN    (+) Pregnant,         Other   blood dyscrasia anemia,                     Anesthesia Plan    ASA 2     epidural     intravenous induction     Anesthetic plan, risks, benefits, and alternatives have been provided, discussed and informed consent has been obtained with: patient.        CODE STATUS:    Code Status (Patient has no pulse and is not breathing): CPR (Attempt to Resuscitate)  Medical Interventions (Patient has pulse or is breathing): Full Support  Release to patient: Routine Release

## 2022-11-03 ENCOUNTER — PATIENT OUTREACH (OUTPATIENT)
Dept: LABOR AND DELIVERY | Facility: HOSPITAL | Age: 22
End: 2022-11-03

## 2022-11-03 LAB
ABO GROUP BLD: NORMAL
ATMOSPHERIC PRESS: 759.8 MMHG
ATMOSPHERIC PRESS: 759.9 MMHG
BASE EXCESS BLDCOA CALC-SCNC: -4.3 MMOL/L (ref -2–2)
BASE EXCESS BLDCOV CALC-SCNC: -4.4 MMOL/L (ref -30–30)
BDY SITE: ABNORMAL
BDY SITE: ABNORMAL
COLLECT TME SMN: ABNORMAL
FETAL BLEED: NEGATIVE
HCO3 BLDCOA-SCNC: 24 MMOL/L (ref 22–28)
HCO3 BLDCOV-SCNC: 21 MMOL/L
MODALITY: ABNORMAL
MODALITY: ABNORMAL
NOTE: ABNORMAL
NOTE: ABNORMAL
NUMBER OF DOSES: NORMAL
PCO2 BLDCOA: 54.2 MMHG (ref 43–63)
PCO2 BLDCOV: 39.3 MM HG (ref 35–51.3)
PH BLDCOA: 7.25 PH UNITS (ref 7.18–7.34)
PH BLDCOV: 7.34 PH UNITS (ref 7.26–7.4)
PO2 BLDCOA: 22.6 MMHG (ref 12–26)
PO2 BLDCOV: 33.7 MM HG (ref 19–39)
RH BLD: NEGATIVE
SAO2 % BLDCOA: 30.1 % (ref 92–99)
SAO2 % BLDCOA: 61.2 % (ref 92–99)
SAO2 % BLDCOV: ABNORMAL %

## 2022-11-03 PROCEDURE — 25010000002 METHYLERGONOVINE MALEATE PER 0.2 MG: Performed by: OBSTETRICS & GYNECOLOGY

## 2022-11-03 PROCEDURE — C1755 CATHETER, INTRASPINAL: HCPCS

## 2022-11-03 PROCEDURE — 25010000002 ONDANSETRON PER 1 MG: Performed by: OBSTETRICS & GYNECOLOGY

## 2022-11-03 PROCEDURE — 82803 BLOOD GASES ANY COMBINATION: CPT

## 2022-11-03 PROCEDURE — 25010000002 FENTANYL CITRATE (PF) 50 MCG/ML SOLUTION: Performed by: ANESTHESIOLOGY

## 2022-11-03 PROCEDURE — 25010000002 RHO D IMMUNE GLOBULIN 1500 UNIT/2ML SOLUTION PREFILLED SYRINGE: Performed by: OBSTETRICS & GYNECOLOGY

## 2022-11-03 PROCEDURE — 88307 TISSUE EXAM BY PATHOLOGIST: CPT

## 2022-11-03 PROCEDURE — 25010000002 DIPHENHYDRAMINE PER 50 MG: Performed by: OBSTETRICS & GYNECOLOGY

## 2022-11-03 PROCEDURE — 85461 HEMOGLOBIN FETAL: CPT | Performed by: OBSTETRICS & GYNECOLOGY

## 2022-11-03 PROCEDURE — 25010000002 ROPIVACAINE PER 1 MG: Performed by: ANESTHESIOLOGY

## 2022-11-03 PROCEDURE — 59410 OBSTETRICAL CARE: CPT | Performed by: OBSTETRICS & GYNECOLOGY

## 2022-11-03 PROCEDURE — 86901 BLOOD TYPING SEROLOGIC RH(D): CPT | Performed by: OBSTETRICS & GYNECOLOGY

## 2022-11-03 PROCEDURE — 3E0234Z INTRODUCTION OF SERUM, TOXOID AND VACCINE INTO MUSCLE, PERCUTANEOUS APPROACH: ICD-10-PCS | Performed by: OBSTETRICS & GYNECOLOGY

## 2022-11-03 PROCEDURE — 25010000002 PENICILLIN G POTASSIUM PER 600000 UNITS: Performed by: OBSTETRICS & GYNECOLOGY

## 2022-11-03 PROCEDURE — 86900 BLOOD TYPING SEROLOGIC ABO: CPT | Performed by: OBSTETRICS & GYNECOLOGY

## 2022-11-03 RX ORDER — FENTANYL CITRATE 50 UG/ML
INJECTION, SOLUTION INTRAMUSCULAR; INTRAVENOUS AS NEEDED
Status: DISCONTINUED | OUTPATIENT
Start: 2022-11-03 | End: 2022-11-03 | Stop reason: SURG

## 2022-11-03 RX ORDER — HYDROCORTISONE 25 MG/G
1 CREAM TOPICAL AS NEEDED
Status: DISCONTINUED | OUTPATIENT
Start: 2022-11-03 | End: 2022-11-05 | Stop reason: HOSPADM

## 2022-11-03 RX ORDER — IBUPROFEN 600 MG/1
600 TABLET ORAL EVERY 6 HOURS PRN
Status: DISCONTINUED | OUTPATIENT
Start: 2022-11-03 | End: 2022-11-05 | Stop reason: HOSPADM

## 2022-11-03 RX ORDER — ONDANSETRON 4 MG/1
4 TABLET, FILM COATED ORAL EVERY 8 HOURS PRN
Status: DISCONTINUED | OUTPATIENT
Start: 2022-11-03 | End: 2022-11-05 | Stop reason: HOSPADM

## 2022-11-03 RX ORDER — BISACODYL 10 MG
10 SUPPOSITORY, RECTAL RECTAL DAILY PRN
Status: DISCONTINUED | OUTPATIENT
Start: 2022-11-04 | End: 2022-11-05 | Stop reason: HOSPADM

## 2022-11-03 RX ORDER — DIPHENHYDRAMINE HYDROCHLORIDE 50 MG/ML
12.5 INJECTION INTRAMUSCULAR; INTRAVENOUS ONCE
Status: COMPLETED | OUTPATIENT
Start: 2022-11-03 | End: 2022-11-03

## 2022-11-03 RX ORDER — SODIUM CHLORIDE 0.9 % (FLUSH) 0.9 %
1-10 SYRINGE (ML) INJECTION AS NEEDED
Status: DISCONTINUED | OUTPATIENT
Start: 2022-11-03 | End: 2022-11-05 | Stop reason: HOSPADM

## 2022-11-03 RX ORDER — ONDANSETRON 2 MG/ML
4 INJECTION INTRAMUSCULAR; INTRAVENOUS EVERY 6 HOURS PRN
Status: DISCONTINUED | OUTPATIENT
Start: 2022-11-03 | End: 2022-11-03 | Stop reason: HOSPADM

## 2022-11-03 RX ORDER — OXYTOCIN/0.9 % SODIUM CHLORIDE 30/500 ML
250 PLASTIC BAG, INJECTION (ML) INTRAVENOUS CONTINUOUS
Status: ACTIVE | OUTPATIENT
Start: 2022-11-03 | End: 2022-11-03

## 2022-11-03 RX ORDER — METHYLERGONOVINE MALEATE 0.2 MG/ML
200 INJECTION INTRAVENOUS ONCE AS NEEDED
Status: COMPLETED | OUTPATIENT
Start: 2022-11-03 | End: 2022-11-03

## 2022-11-03 RX ORDER — CARBOPROST TROMETHAMINE 250 UG/ML
250 INJECTION, SOLUTION INTRAMUSCULAR
Status: DISCONTINUED | OUTPATIENT
Start: 2022-11-03 | End: 2022-11-03 | Stop reason: HOSPADM

## 2022-11-03 RX ORDER — ONDANSETRON 2 MG/ML
4 INJECTION INTRAMUSCULAR; INTRAVENOUS EVERY 6 HOURS PRN
Status: DISCONTINUED | OUTPATIENT
Start: 2022-11-03 | End: 2022-11-05 | Stop reason: HOSPADM

## 2022-11-03 RX ORDER — IBUPROFEN 600 MG/1
600 TABLET ORAL EVERY 6 HOURS PRN
Status: DISCONTINUED | OUTPATIENT
Start: 2022-11-03 | End: 2022-11-03 | Stop reason: SDUPTHER

## 2022-11-03 RX ORDER — ONDANSETRON 4 MG/1
4 TABLET, FILM COATED ORAL EVERY 6 HOURS PRN
Status: DISCONTINUED | OUTPATIENT
Start: 2022-11-03 | End: 2022-11-03 | Stop reason: HOSPADM

## 2022-11-03 RX ORDER — OXYTOCIN 10 [USP'U]/ML
10 INJECTION, SOLUTION INTRAMUSCULAR; INTRAVENOUS ONCE
Status: DISCONTINUED | OUTPATIENT
Start: 2022-11-03 | End: 2022-11-03 | Stop reason: HOSPADM

## 2022-11-03 RX ORDER — PROMETHAZINE HYDROCHLORIDE 12.5 MG/1
12.5 TABLET ORAL EVERY 4 HOURS PRN
Status: DISCONTINUED | OUTPATIENT
Start: 2022-11-03 | End: 2022-11-05 | Stop reason: HOSPADM

## 2022-11-03 RX ORDER — PRENATAL VIT/IRON FUM/FOLIC AC 27MG-0.8MG
1 TABLET ORAL DAILY
Status: DISCONTINUED | OUTPATIENT
Start: 2022-11-03 | End: 2022-11-05 | Stop reason: HOSPADM

## 2022-11-03 RX ORDER — FENTANYL CITRATE 50 UG/ML
INJECTION, SOLUTION INTRAMUSCULAR; INTRAVENOUS
Status: COMPLETED
Start: 2022-11-03 | End: 2022-11-03

## 2022-11-03 RX ORDER — METHYLERGONOVINE MALEATE 0.2 MG/1
200 TABLET ORAL EVERY 6 HOURS SCHEDULED
Status: COMPLETED | OUTPATIENT
Start: 2022-11-03 | End: 2022-11-04

## 2022-11-03 RX ORDER — MISOPROSTOL 200 UG/1
800 TABLET ORAL ONCE AS NEEDED
Status: COMPLETED | OUTPATIENT
Start: 2022-11-03 | End: 2022-11-03

## 2022-11-03 RX ORDER — DOCUSATE SODIUM 100 MG/1
100 CAPSULE, LIQUID FILLED ORAL 2 TIMES DAILY
Status: DISCONTINUED | OUTPATIENT
Start: 2022-11-03 | End: 2022-11-05 | Stop reason: HOSPADM

## 2022-11-03 RX ORDER — OXYTOCIN/0.9 % SODIUM CHLORIDE 30/500 ML
125 PLASTIC BAG, INJECTION (ML) INTRAVENOUS CONTINUOUS PRN
Status: DISCONTINUED | OUTPATIENT
Start: 2022-11-03 | End: 2022-11-03

## 2022-11-03 RX ORDER — OXYTOCIN/0.9 % SODIUM CHLORIDE 30/500 ML
999 PLASTIC BAG, INJECTION (ML) INTRAVENOUS ONCE
Status: DISCONTINUED | OUTPATIENT
Start: 2022-11-03 | End: 2022-11-03 | Stop reason: HOSPADM

## 2022-11-03 RX ORDER — OXYCODONE HYDROCHLORIDE AND ACETAMINOPHEN 5; 325 MG/1; MG/1
2 TABLET ORAL EVERY 4 HOURS PRN
Status: DISCONTINUED | OUTPATIENT
Start: 2022-11-03 | End: 2022-11-05 | Stop reason: HOSPADM

## 2022-11-03 RX ADMIN — SODIUM CHLORIDE, POTASSIUM CHLORIDE, SODIUM LACTATE AND CALCIUM CHLORIDE 125 ML/HR: 600; 310; 30; 20 INJECTION, SOLUTION INTRAVENOUS at 00:19

## 2022-11-03 RX ADMIN — OXYCODONE AND ACETAMINOPHEN 2 TABLET: 5; 325 TABLET ORAL at 11:00

## 2022-11-03 RX ADMIN — Medication: at 17:38

## 2022-11-03 RX ADMIN — OXYCODONE AND ACETAMINOPHEN 2 TABLET: 5; 325 TABLET ORAL at 17:56

## 2022-11-03 RX ADMIN — ONDANSETRON 4 MG: 2 INJECTION INTRAMUSCULAR; INTRAVENOUS at 11:42

## 2022-11-03 RX ADMIN — MISOPROSTOL 800 MCG: 200 TABLET ORAL at 10:15

## 2022-11-03 RX ADMIN — PENICILLIN G 3 MILLION UNITS: 3000000 INJECTION, SOLUTION INTRAVENOUS at 05:51

## 2022-11-03 RX ADMIN — IBUPROFEN 600 MG: 600 TABLET, FILM COATED ORAL at 20:29

## 2022-11-03 RX ADMIN — Medication 8 ML/HR: at 03:45

## 2022-11-03 RX ADMIN — FENTANYL CITRATE 100 MCG: 0.05 INJECTION, SOLUTION INTRAMUSCULAR; INTRAVENOUS at 04:30

## 2022-11-03 RX ADMIN — ROPIVACAINE HYDROCHLORIDE 15 ML: 2 INJECTION, SOLUTION EPIDURAL; INFILTRATION at 05:42

## 2022-11-03 RX ADMIN — HUMAN RHO(D) IMMUNE GLOBULIN 1500 UNITS: 1500 SOLUTION INTRAMUSCULAR; INTRAVENOUS at 17:41

## 2022-11-03 RX ADMIN — PENICILLIN G 3 MILLION UNITS: 3000000 INJECTION, SOLUTION INTRAVENOUS at 01:47

## 2022-11-03 RX ADMIN — FAMOTIDINE 20 MG: 10 INJECTION INTRAVENOUS at 11:42

## 2022-11-03 RX ADMIN — DIPHENHYDRAMINE HYDROCHLORIDE 12.5 MG: 50 INJECTION, SOLUTION INTRAMUSCULAR; INTRAVENOUS at 04:43

## 2022-11-03 RX ADMIN — SODIUM CHLORIDE, POTASSIUM CHLORIDE, SODIUM LACTATE AND CALCIUM CHLORIDE 125 ML/HR: 600; 310; 30; 20 INJECTION, SOLUTION INTRAVENOUS at 08:24

## 2022-11-03 RX ADMIN — METHYLERGONOVINE MALEATE 200 MCG: 0.2 TABLET ORAL at 14:48

## 2022-11-03 RX ADMIN — IBUPROFEN 600 MG: 600 TABLET, FILM COATED ORAL at 11:00

## 2022-11-03 RX ADMIN — DOCUSATE SODIUM 100 MG: 100 CAPSULE, LIQUID FILLED ORAL at 20:29

## 2022-11-03 RX ADMIN — HYDROCORTISONE 1 APPLICATION: 25 CREAM TOPICAL at 17:51

## 2022-11-03 RX ADMIN — METHYLERGONOVINE MALEATE 200 MCG: 0.2 TABLET ORAL at 20:29

## 2022-11-03 RX ADMIN — Medication 250 ML/HR: at 10:30

## 2022-11-03 RX ADMIN — METHYLERGONOVINE MALEATE 200 MCG: 0.2 INJECTION INTRAVENOUS at 10:00

## 2022-11-03 RX ADMIN — LIDOCAINE HYDROCHLORIDE 6 MG: 10; .005 INJECTION, SOLUTION EPIDURAL; INFILTRATION; INTRACAUDAL; PERINEURAL at 04:30

## 2022-11-03 RX ADMIN — ONDANSETRON 4 MG: 2 INJECTION INTRAMUSCULAR; INTRAVENOUS at 00:17

## 2022-11-03 NOTE — ANESTHESIA POSTPROCEDURE EVALUATION
Patient: Paulette Martinez    Procedure Summary     Date: 11/02/22 Room / Location:     Anesthesia Start: 1008 Anesthesia Stop: 11/03/22 0948    Procedure: LABOR ANALGESIA Diagnosis:     Scheduled Providers:  Provider: Prince Greco MD    Anesthesia Type: epidural ASA Status: 2          Anesthesia Type: epidural    Vitals  Vitals Value Taken Time   /70 11/03/22 1415   Temp 36.2 °C (97.1 °F) 11/03/22 1415   Pulse 78 11/03/22 1415   Resp 16 11/03/22 1415   SpO2 98 % 11/03/22 1000           Anesthesia Post Evaluation

## 2022-11-03 NOTE — PLAN OF CARE
Problem: Adult Inpatient Plan of Care  Goal: Plan of Care Review  Outcome: Ongoing, Progressing  Flowsheets (Taken 11/3/2022 0607)  Plan of Care Reviewed With: patient  Outcome Evaluation: PT was cervidil IOL, on 14 mu of pitocin at rn arrival.  Was making good cervical change through the night and pain was well controlled.  Approx 2 am, pt was complaining of constant perineal pain, rn educated that baby's head was low and she would be feeling pressure until baby was delivered.  Pt pain increased, and pt was not making cervical change.  Anesthesia called for several redoses and eventually a second epidural was placed.  Pt is currently comfortable and sleeping.  Dr Metz wants pt to start pushing at 0630.  Goal: Patient-Specific Goal (Individualized)  Outcome: Ongoing, Progressing  Flowsheets (Taken 11/3/2022 0607)  Patient-Specific Goals (Include Timeframe): healthy mom and baby by discharge  Individualized Care Needs: pain control  Anxieties, Fears or Concerns: First baby, pain controlled  Goal: Absence of Hospital-Acquired Illness or Injury  Outcome: Ongoing, Progressing  Intervention: Identify and Manage Fall Risk  Recent Flowsheet Documentation  Taken 11/2/2022 1915 by Beth Castro, RN  Safety Promotion/Fall Prevention:   activity supervised   clutter free environment maintained   fall prevention program maintained   nonskid shoes/slippers when out of bed   room organization consistent   safety round/check completed  Intervention: Prevent Skin Injury  Recent Flowsheet Documentation  Taken 11/2/2022 1915 by Beth Castro, RN  Body Position: right  Intervention: Prevent and Manage VTE (Venous Thromboembolism) Risk  Recent Flowsheet Documentation  Taken 11/2/2022 1915 by Beth Castro, RN  Activity Management:   activity adjusted per tolerance   bedrest  Intervention: Prevent Infection  Recent Flowsheet Documentation  Taken 11/2/2022 1915 by Beth Castro, RN  Infection Prevention:   cohorting  utilized   equipment surfaces disinfected   hand hygiene promoted   personal protective equipment utilized   single patient room provided   visitors restricted/screened  Goal: Optimal Comfort and Wellbeing  Outcome: Ongoing, Progressing  Intervention: Monitor Pain and Promote Comfort  Recent Flowsheet Documentation  Taken 11/2/2022 1915 by Beth Castro RN  Pain Management Interventions: see MAR  Intervention: Provide Person-Centered Care  Recent Flowsheet Documentation  Taken 11/2/2022 1915 by Beth Castro RN  Trust Relationship/Rapport:   care explained   choices provided   emotional support provided   empathic listening provided   questions answered   questions encouraged   reassurance provided   thoughts/feelings acknowledged  Goal: Readiness for Transition of Care  Outcome: Ongoing, Progressing     Problem: Bleeding (Labor)  Goal: Hemostasis  Outcome: Ongoing, Progressing     Problem: Change in Fetal Wellbeing (Labor)  Goal: Stable Fetal Wellbeing  Outcome: Ongoing, Progressing  Intervention: Promote and Monitor Fetal Wellbeing  Recent Flowsheet Documentation  Taken 11/2/2022 1915 by Beth Castro RN  Body Position: right     Problem: Delayed Labor Progression (Labor)  Goal: Effective Progression to Delivery  Outcome: Ongoing, Progressing     Problem: Infection (Labor)  Goal: Absence of Infection Signs and Symptoms  Outcome: Ongoing, Progressing  Intervention: Prevent or Manage Infection  Recent Flowsheet Documentation  Taken 11/2/2022 1915 by Beth Castro RN  Infection Prevention:   cohorting utilized   equipment surfaces disinfected   hand hygiene promoted   personal protective equipment utilized   single patient room provided   visitors restricted/screened     Problem: Labor Pain (Labor)  Goal: Acceptable Pain Control  Outcome: Ongoing, Progressing     Problem: Uterine Tachysystole (Labor)  Goal: Normal Uterine Contraction Pattern  Outcome: Ongoing, Progressing     Problem: Skin Injury Risk  Increased  Goal: Skin Health and Integrity  Outcome: Ongoing, Progressing  Intervention: Optimize Skin Protection  Recent Flowsheet Documentation  Taken 2022 by Beth Castro RN  Head of Bed (HOB) Positioning: HOB at 20-30 degrees     Problem:  Fall Injury Risk  Goal: Absence of Fall, Infant Drop and Related Injury  Outcome: Ongoing, Progressing  Intervention: Identify and Manage Contributors  Recent Flowsheet Documentation  Taken 2022 by Beth Castro RN  Medication Review/Management: medications reviewed  Intervention: Promote Injury-Free Environment  Recent Flowsheet Documentation  Taken 2022 by Beth Castro RN  Safety Promotion/Fall Prevention:   activity supervised   clutter free environment maintained   fall prevention program maintained   nonskid shoes/slippers when out of bed   room organization consistent   safety round/check completed   Goal Outcome Evaluation:  Plan of Care Reviewed With: patient           Outcome Evaluation: PT was cervidil IOL, on 14 mu of pitocin at rn arrival.  Was making good cervical change through the night and pain was well controlled.  Approx 2 am, pt was complaining of constant perineal pain, rn educated that baby's head was low and she would be feeling pressure until baby was delivered.  Pt pain increased, and pt was not making cervical change.  Anesthesia called for several redoses and eventually a second epidural was placed.  Pt is currently comfortable and sleeping.  Dr Metz wants pt to start pushing at 0630.

## 2022-11-03 NOTE — OUTREACH NOTE
[facility]  IP Postpartum    Questions/Answers    Flowsheet Row Responses   Best Method for Contacting Cell   Support Person Present No   Does the patient have a car seat at the hospital Yes   Delivery Note Reviewed Reviewed   Were birth expectations met? Yes   Is there a need for additional support/resources? No   Lactation Note Reviewed Reviewed   Is additional support needed? No   Any questions or concerns? No   Is the patient going to use Meds to Beds? Yes   Any concerns related discharge meds/ability to  prescriptions? No   Confirm Postpartum OB appointment --  [knows to schedule]   Confirm initial well-child Pediatrician appointment date/time: --  [knows to schedule ]   Does patient have transportation to appointments? Yes   Any other assistance needed to ensure she is able to attend appointments? No   Does patient have supplies needed at home for  care? Breast Pump, Clothing, Crib, Diapers, Formula        F/u in one week    Stephany Thomas RN  Maternity Nurse Navigator    11/3/2022, 15:36 EDT

## 2022-11-03 NOTE — LACTATION NOTE
This note was copied from a baby's chart.  Per bedside RN, infant gaggy & uncoordinated w/SSB when attempting finger feeding w/kwadwoe.  3ml of EBM syringed to baby slowly over 15 mins.  Propped baby's head to 45 degrees while in crib.  Baby observed to be gaggy & uncoordinated.  Burped & retained feeding.  Bedside made aware.

## 2022-11-03 NOTE — L&D DELIVERY NOTE
[facility]   Vaginal Delivery Note    Patient Name: Paulette Martinez  : 2000  MRN: [id number]    Date of Delivery: 11/3/2022     Diagnosis     Pre & Post-Delivery:  Intrauterine pregnancy at 39w6d  Labor status: Induction of labor     LGA (large for gestational age) fetus affecting management of mother    Rh negative, antepartum    UTI (urinary tract infection) during pregnancy    Maternal anemia in pregnancy, antepartum    Excessive weight gain affecting pregnancy    GBS (group B Streptococcus carrier), +RV culture, currently pregnant    Shoulder dystocia, delivered    Vaginal delivery    PPH (postpartum hemorrhage)             Problem List    Transfer to Postpartum     Review the Delivery Report for details.     Delivery     Delivery: Vaginal, Spontaneous     YOB: 2020    Time of Birth:  Gestational Age 9:48 AM   39w6d     Anesthesia: Epidural     Delivering clinician: Cristy Metz    Forceps?   No   Vacuum? No    Shoulder dystocia present: Yes Shoulder Dystocia Details  Dystocia Present? Yes   Time head delivered:    Gentle attempt at traction, assisted by maternal expulsive forces:  yes   If no, why:    Anterior shoulder:  Right   Time recognized: 11/3/2022  9:47 AM     Time help called: 11/3/2022  9:47 AM  Called by: diane MITCHELL    Time provider arrived:   Provider who arrived: Dr. Metz    Time NICU arrived:   NICU staff:     Time additional staff arrived: 11/3/2022  9:47 AM  Additional staff:            Delivery narrative:  Patient presented for elective induction of labor and received cervidil. She was then started on Pitocin and progressed along a protracted labor course with pain management issues. The epidural was eventually replaced and patient became comfortable. She pushed for approximately three hours to deliver a LVMI in direct occiput anterior position that restituted to maternal right. A second degree episiotomy was cut prior to delivery to relieve tight perineal  band. After the head delivered, an anterior right shoulder dystocia was encountered and the patient was asked to stop pushing, the head of the bed was placed flat and patient was placed in McRobert's position. Suprapubic pressure was applied unsuccessfully and an attempt was made to deliver the posterior arm. This was also unsuccessful so another attempt at suprapubic pressure and wood's screw was attempted that released the right anterior shoulder. With gentle traction on the fetal head accompanied by maternal expulsive efforts, the anterior and posterior shoulder delivered. Infant was bulb suctioned and after delayed cord clamping, cord was cut and infant handed of to waiting nurse. Placenta delivered SI/3VC.  The uterus was not explored due to patient discomfort. Lidocaine was injected into the perineum and episiotomy was repaired with 3-0 vicryl. Bladder was drained.  Due to brisk bleeding IM methergine, Cytotec 800 mcg per rectum and IV pitocin were used for hemostasis.                   Infant     Findings: male  infant     Infant observations: Weight: 4110 g (9 lb 1 oz)   Length: 20.5  in  Observations/Comments:  ldr 5 panda      Apgars: 7  @ 1 minute /    9  @ 5 minutes   Infant Name: Kayson     Placenta & Cord         Placenta delivered  Expressed  at   11/3/2022 10:00 AM     Cord: 3 vessels  present.   Nuchal Cord?  no   Cord blood obtained: Yes    Cord gases obtained:  Yes    Cord gas results: Venous:    pH, Cord Venous   Date Value Ref Range Status   11/03/2022 7.336 7.260 - 7.400 pH Units Final       Arterial:    pH, Cord Arterial   Date Value Ref Range Status   11/03/2022 7.25 7.18 - 7.34 pH Units Final        Repair     Episiotomy: Median     Yes  Suture used for repair: 3-0 Vicryl    Lacerations: No   Estimated Blood Loss:       Quantitative Blood Loss: Quantitative Blood Loss (mL): 801 mL (11/03/22 1100)        Complications     prolonged second stage, 50 second shoulder dystocia, postpartum  hemorrhage     Disposition     Mother to Remain in LD  in stable condition currently.  Baby to remains with mom  in stable condition currently.    Cristy Metz MD  11/03/22  15:09 EDT

## 2022-11-03 NOTE — LACTATION NOTE
This note was copied from a baby's chart.  P1T LGA baby - new admission.  Mom has a sleeping baby positioned in football hold at her right breast.  Attempted 5 times to latch & baby makes good attempts but is unable to maintain grasp of nipple.  24mm nipple shield given & applied.  Instructed on use/cleaning.  Infant fed x10 mins then syringed another 3ml of EBM.      Demonstrated hand expression, hand pump use, finger feeding w/syringe, cleaning pump parts & nipple shield.  All supplies given.      Mom has a PBP & has questions about flange size.  Discussed factors to help determine proper flange size.      Education provided:  feeding cues; frequency/duration; milk production in relation to infant’s small stomach size; drops of colostrum being normal the first few days progressing to increasing amounts of milk & eventually full supply 3-5 days after delivery; positioning at breast; signs of good latch; & hand expression.  Verbalized understanding.     Mother denies questions/concerns at this time.  Encouraged to call for help when needed.  LC #'s on WB.

## 2022-11-03 NOTE — NURSING NOTE
"Pt co pressure in \"cervix\" and \"butt\" that has been increasing during the night.  AA came for redose.  Pt dilated around 9/10 for several hours, position changes performed with no relief for pt.  Dr Metz came to bedside to assess, pt was in increasing amounts of pain with cervial check and was unable to bare down for md to try to reduce cervix.  Pitocin stopped until pt is comfortable.  Epidural still in placed. AA phoned to assess.  "

## 2022-11-04 LAB
BASOPHILS # BLD AUTO: 0.03 10*3/MM3 (ref 0–0.2)
BASOPHILS NFR BLD AUTO: 0.2 % (ref 0–1.5)
DEPRECATED RDW RBC AUTO: 49.8 FL (ref 37–54)
EOSINOPHIL # BLD AUTO: 0.13 10*3/MM3 (ref 0–0.4)
EOSINOPHIL NFR BLD AUTO: 1 % (ref 0.3–6.2)
ERYTHROCYTE [DISTWIDTH] IN BLOOD BY AUTOMATED COUNT: 15.7 % (ref 12.3–15.4)
HCT VFR BLD AUTO: 24.5 % (ref 34–46.6)
HGB BLD-MCNC: 8.5 G/DL (ref 12–15.9)
IMM GRANULOCYTES # BLD AUTO: 0.07 10*3/MM3 (ref 0–0.05)
IMM GRANULOCYTES NFR BLD AUTO: 0.5 % (ref 0–0.5)
LYMPHOCYTES # BLD AUTO: 1.85 10*3/MM3 (ref 0.7–3.1)
LYMPHOCYTES NFR BLD AUTO: 13.7 % (ref 19.6–45.3)
MCH RBC QN AUTO: 30 PG (ref 26.6–33)
MCHC RBC AUTO-ENTMCNC: 34.7 G/DL (ref 31.5–35.7)
MCV RBC AUTO: 86.6 FL (ref 79–97)
MONOCYTES # BLD AUTO: 0.91 10*3/MM3 (ref 0.1–0.9)
MONOCYTES NFR BLD AUTO: 6.7 % (ref 5–12)
NEUTROPHILS NFR BLD AUTO: 10.56 10*3/MM3 (ref 1.7–7)
NEUTROPHILS NFR BLD AUTO: 77.9 % (ref 42.7–76)
NRBC BLD AUTO-RTO: 0 /100 WBC (ref 0–0.2)
PLATELET # BLD AUTO: 150 10*3/MM3 (ref 140–450)
PMV BLD AUTO: 11.2 FL (ref 6–12)
RBC # BLD AUTO: 2.83 10*6/MM3 (ref 3.77–5.28)
WBC NRBC COR # BLD: 13.55 10*3/MM3 (ref 3.4–10.8)

## 2022-11-04 PROCEDURE — 85025 COMPLETE CBC W/AUTO DIFF WBC: CPT | Performed by: OBSTETRICS & GYNECOLOGY

## 2022-11-04 RX ADMIN — DOCUSATE SODIUM 100 MG: 100 CAPSULE, LIQUID FILLED ORAL at 22:05

## 2022-11-04 RX ADMIN — OXYCODONE AND ACETAMINOPHEN 2 TABLET: 5; 325 TABLET ORAL at 08:44

## 2022-11-04 RX ADMIN — METHYLERGONOVINE MALEATE 200 MCG: 0.2 TABLET ORAL at 08:45

## 2022-11-04 RX ADMIN — OXYCODONE AND ACETAMINOPHEN 2 TABLET: 5; 325 TABLET ORAL at 18:38

## 2022-11-04 RX ADMIN — DOCUSATE SODIUM 100 MG: 100 CAPSULE, LIQUID FILLED ORAL at 08:46

## 2022-11-04 RX ADMIN — IBUPROFEN 600 MG: 600 TABLET, FILM COATED ORAL at 18:39

## 2022-11-04 RX ADMIN — IBUPROFEN 600 MG: 600 TABLET, FILM COATED ORAL at 12:52

## 2022-11-04 RX ADMIN — IBUPROFEN 600 MG: 600 TABLET, FILM COATED ORAL at 04:10

## 2022-11-04 RX ADMIN — OXYCODONE AND ACETAMINOPHEN 2 TABLET: 5; 325 TABLET ORAL at 23:22

## 2022-11-04 RX ADMIN — METHYLERGONOVINE MALEATE 200 MCG: 0.2 TABLET ORAL at 02:32

## 2022-11-04 NOTE — PLAN OF CARE
Goal Outcome Evaluation:  Plan of Care Reviewed With: patient        Progress: improving  Outcome Evaluation: VSS, assessment WDL, pain controlled, attempting to breastfeed, bonding with infant

## 2022-11-04 NOTE — PROGRESS NOTES
[facility]   Obstetrics and Gynecology     2022    Name:Paulette Martinez   MR#:[id number]    Vaginal Delivery Progress Note    HD#3    Subjective   Postpartum Day 1: 22 y.o. female  delivered at 39w6d  delivered a male  infant.     The patient feels well.  Her pain is controlled.    She is ambulating well.  Patient describes her bleeding as thin lochia.    Breastfeeding/bottle:  The patient is currently breastfeeding.    Patient Active Problem List   Diagnosis   • Closed left ankle fracture, with routine healing, subsequent encounter   • HSV-1 infection   • Marijuana use   • Rh negative, antepartum   • Nausea and vomiting in pregnancy   • UTI (urinary tract infection) during pregnancy   • Maternal anemia in pregnancy, antepartum   • LGA (large for gestational age) fetus affecting management of mother   • Excessive weight gain affecting pregnancy   • GBS (group B Streptococcus carrier), +RV culture, currently pregnant   • Shoulder dystocia, delivered   • Vaginal delivery   • PPH (postpartum hemorrhage)       Objective   Vital Signs Range for the last 24 hours  Temp: Temp:  [97.1 °F (36.2 °C)-98.4 °F (36.9 °C)] 97.9 °F (36.6 °C) Temp src: Oral   BP: BP: (101-130)/(60-95) 101/61        Pulse: Heart Rate:  [] 84  RR: Resp:  [16-24] 18  Weight: 84.4 kg (186 lb)  BMI:  Body mass index is 30.95 kg/m².    Results from last 7 days   Lab Units 22  0537 22   WBC 10*3/mm3 13.55* 8.63   HEMOGLOBIN g/dL 8.5* 10.9*   HEMATOCRIT % 24.5* 32.1*   PLATELETS 10*3/mm3 150 167           Physical Exam  Vitals and nursing note reviewed.   Constitutional:       General: She is not in acute distress.     Appearance: Normal appearance.   Cardiovascular:      Pulses: Normal pulses.   Pulmonary:      Effort: Pulmonary effort is normal.   Abdominal:      General: There is no distension.      Palpations: Abdomen is soft.      Tenderness: There is no abdominal tenderness. There is no guarding or  rebound.   Genitourinary:     Comments: Uterus firm and below umbilicus  Musculoskeletal:         General: No swelling or tenderness.      Right lower leg: No edema.      Left lower leg: No edema.   Neurological:      Mental Status: She is alert and oriented to person, place, and time.   Psychiatric:         Mood and Affect: Mood normal.         Behavior: Behavior normal.         Assessment/Plan   1.  PPD# 1    Plan:   Continue routine postpartum care  Infant circumcision:  Procedure reviewed with the patient including risk, benefits and elective nature of the procedure    Nevin Lopez MD  11/4/2022 10:17 EDT

## 2022-11-04 NOTE — LACTATION NOTE
This note was copied from a baby's chart.  Mom has been having difficulty latching baby and has been pumping with her Zomee pump and supplementing with formula. Encouraged pumping every 3 hrs if baby is not nursing well and call for any assistance today.

## 2022-11-04 NOTE — PROGRESS NOTES
Discharge Planning Assessment  [facility]     Patient Name: Paulette Martinez  MRN: [id number]  Today's Date: 11/4/2022    Admit Date: 11/1/2022    Plan: Infant may discharge to mother when medically ready; CSW will follow cord. JOBY Go.   Discharge Needs Assessment    No documentation.                Discharge Plan     Row Name 11/04/22 1220       Plan    Plan Infant may discharge to mother when medically ready; CSW will follow cord. JOBY Go.    Plan Comments Mother: Paulette Martinez, MRN: [id number]; infant: Sergo “Luis” Michelle, MRN: [id number]. CSW was consulted for “THC+ in March.” Of note, mother’s UDS was not collected on admit. Infant’s UDS was not collected; cord toxicology was sent. CSW met with mother alone at bedside. Mother verified address, phone number, and insurance. Mother confirmed [employer] has spoken to her about adding infant to insurance. Mother reports having car seat, crib, clothes, and diapers for infant. This is mother’s first baby. Mother reported maternal grandma, father of infant, paternal grandparents, friends, and other family members are available for support as needed. Mother is taking infant to [facility] for follow-up after hospital discharge; mother is comfortable scheduling appointments for infant, and has transportation. Mother is currently on WIC, and plans to enroll infant onto her plan after hospital discharge. Mother denied feeling unsafe or threatened at home. CSW provided mother with a packet of resources including: WIC, HANDS, transportation, infant supplies, counseling, online support groups, postpartum mood and anxiety resources, and general community resources. CSW spent time building rapport with mother, and offered validation, support, and encouragement to mother throughout assessment. Mother was polite and appropriate, and denied having unmet needs at this time. CSW will follow cord toxicology, and report to CPS if  warranted. JOBY Go.              Continued Care and Services - Admitted Since 11/1/2022    Coordination has not been started for this encounter.     Selected Continued Care - Episodes Includes continued care and service providers with selected services from the active episodes listed below    [facility] Episode start date: 8/4/2022   There are no active outsourced providers for this episode.                  Demographic Summary     Row Name 11/04/22 1219       General Information    Admission Type inpatient    Arrived From home    Referral Source nursing    Reason for Consult substance use concerns    General Information Comments THC+ in March               Functional Status    No documentation.                Psychosocial     Row Name 11/04/22 1219       Behavior WDL    Behavior WDL WDL       Emotion Mood WDL    Emotion/Mood/Affect WDL WDL       Speech WDL    Speech WDL WDL       Perceptual State WDL    Perceptual State WDL WDL       Thought Process WDL    Thought Process WDL WDL       Intellectual Performance WDL    Intellectual Performance WDL WDL       Coping/Stress    Major Change/Loss/Stressor birth    Patient Personal Strengths future/goal oriented;motivated;positive attitude;strong support system    Sources of Support friend(s);other family members;parent(s);significant other               Abuse/Neglect     Row Name 11/04/22 1220       Personal Safety    Feels Unsafe at Home or Work/School no    Feels Threatened by Someone no    Does Anyone Try to Keep You From Having Contact with Others or Doing Things Outside Your Home? no    Physical Signs of Abuse Present no               Legal    No documentation.                Substance Abuse     Row Name 11/04/22 1220       Substance Use    Substance Use Comment No UDS mom; mom pos for THC in march. No UDS infant; cord tox sent.               Patient Forms    No documentation.                   FADY George

## 2022-11-05 VITALS
OXYGEN SATURATION: 98 % | HEIGHT: 65 IN | RESPIRATION RATE: 18 BRPM | WEIGHT: 186 LBS | DIASTOLIC BLOOD PRESSURE: 80 MMHG | SYSTOLIC BLOOD PRESSURE: 124 MMHG | TEMPERATURE: 98.1 F | HEART RATE: 79 BPM | BODY MASS INDEX: 30.99 KG/M2

## 2022-11-05 RX ORDER — FERROUS SULFATE 325(65) MG
325 TABLET ORAL 2 TIMES DAILY
Qty: 60 TABLET | Refills: 0 | Status: SHIPPED | OUTPATIENT
Start: 2022-11-05 | End: 2022-12-14

## 2022-11-05 RX ORDER — PSEUDOEPHEDRINE HCL 30 MG
100 TABLET ORAL 2 TIMES DAILY
Qty: 60 CAPSULE | Refills: 0 | Status: SHIPPED | OUTPATIENT
Start: 2022-11-05 | End: 2022-12-14

## 2022-11-05 RX ORDER — OXYCODONE HYDROCHLORIDE AND ACETAMINOPHEN 5; 325 MG/1; MG/1
2 TABLET ORAL EVERY 4 HOURS PRN
Qty: 12 TABLET | Refills: 0 | Status: SHIPPED | OUTPATIENT
Start: 2022-11-05 | End: 2022-11-10

## 2022-11-05 RX ORDER — IBUPROFEN 600 MG/1
600 TABLET ORAL EVERY 6 HOURS PRN
Qty: 30 TABLET | Refills: 0 | Status: SHIPPED | OUTPATIENT
Start: 2022-11-05 | End: 2022-12-14

## 2022-11-05 RX ADMIN — Medication 1 TABLET: at 08:18

## 2022-11-05 RX ADMIN — IBUPROFEN 600 MG: 600 TABLET, FILM COATED ORAL at 00:32

## 2022-11-05 RX ADMIN — DOCUSATE SODIUM 100 MG: 100 CAPSULE, LIQUID FILLED ORAL at 08:18

## 2022-11-05 RX ADMIN — OXYCODONE AND ACETAMINOPHEN 2 TABLET: 5; 325 TABLET ORAL at 08:18

## 2022-11-05 RX ADMIN — IBUPROFEN 600 MG: 600 TABLET, FILM COATED ORAL at 06:32

## 2022-11-05 NOTE — DISCHARGE SUMMARY
[facility]   Obstetrics and Gynecology   Vaginal delivery Discharge Summary      Date of Admission: 2022    Date of Discharge:        Patient: Paulette Martinez      MR#:[id number]    Surgeon/OB: Cristy Metz     Discharge Diagnosis:   Vaginal Delivery at 39w6d, uncomplicated recovery    LGA (large for gestational age) fetus affecting management of mother    Rh negative, antepartum    UTI (urinary tract infection) during pregnancy    Maternal anemia in pregnancy, antepartum    Excessive weight gain affecting pregnancy    GBS (group B Streptococcus carrier), +RV culture, currently pregnant    Shoulder dystocia, delivered    Vaginal delivery    PPH (postpartum hemorrhage)      Procedures:  Vaginal, Spontaneous     11/3/2022    9:48 AM      Anesthesia:  Epidural     Presenting Problem/History of Present Illness  LGA (large for gestational age) fetus affecting management of mother [O36.60X0]  Vaginal delivery [O80]     Patient Active Problem List   Diagnosis   • Closed left ankle fracture, with routine healing, subsequent encounter   • HSV-1 infection   • Marijuana use   • Rh negative, antepartum   • Nausea and vomiting in pregnancy   • UTI (urinary tract infection) during pregnancy   • Maternal anemia in pregnancy, antepartum   • LGA (large for gestational age) fetus affecting management of mother   • Excessive weight gain affecting pregnancy   • GBS (group B Streptococcus carrier), +RV culture, currently pregnant   • Shoulder dystocia, delivered   • Vaginal delivery   • PPH (postpartum hemorrhage)       Hospital Course  Patient is a 22 y.o. female  at 39w6d status post vaginal delivery.    Uneventful recovery.  Patient is ambulating, tolerating a regular diet.  Perineum is intact.  Patient's hemoglobin is low at 8.5.  She will take iron twice daily until she sees Dr. Metz.  Patient received RhoGAM during hospitalization.    Infant:   male  fetus 4110 g (9 lb 1 oz)  with Apgar scores of  7 , 9  at five minutes.    Condition on Discharge:  Stable    Vital Signs  Temp:  [97.7 °F (36.5 °C)-98.1 °F (36.7 °C)] 98.1 °F (36.7 °C)  Heart Rate:  [70-90] 79  Resp:  [18] 18  BP: (100-124)/(64-80) 124/80    Results from last 7 days   Lab Units 11/04/22  0537 11/01/22 2014   WBC 10*3/mm3 13.55* 8.63   HEMOGLOBIN g/dL 8.5* 10.9*   HEMATOCRIT % 24.5* 32.1*   PLATELETS 10*3/mm3 150 167             Discharge Disposition  Home or Self Care    Discharge Medications     Discharge Medications      New Medications      Instructions Start Date   docusate sodium 100 MG capsule   100 mg, Oral, 2 Times Daily      ibuprofen 600 MG tablet  Commonly known as: ADVIL,MOTRIN   600 mg, Oral, Every 6 Hours PRN      oxyCODONE-acetaminophen 5-325 MG per tablet  Commonly known as: PERCOCET   2 tablets, Oral, Every 4 Hours PRN         Changes to Medications      Instructions Start Date   ferrous sulfate 325 (65 FE) MG tablet  What changed: Another medication with the same name was added. Make sure you understand how and when to take each.   325 mg, Oral, Daily With Breakfast & Dinner      ferrous sulfate 325 (65 FE) MG tablet  What changed: You were already taking a medication with the same name, and this prescription was added. Make sure you understand how and when to take each.   325 mg, Oral, 2 Times Daily         Continue These Medications      Instructions Start Date   prenatal (CLASSIC) vitamin  tablet  Generic drug: prenatal vitamin   Oral, Daily             Discharge Diet: Regular    Activity at Discharge: Pelvic rest    Follow-up Appointments  Future Appointments   Date Time Provider Department Center   12/2/2022  9:15 AM Cristy Metz MD [facility]           Prenatal labs/vax:   Immunization History   Administered Date(s) Administered   • Rho (D) Immune Globulin 08/08/2022, 11/03/2022   • Tdap 08/08/2022         External Prenatal Results     Pregnancy Outside Results - Transcribed From Office Records - See  Scanned Records For Details     Test Value Date Time    ABO  A  11/03/22 1300    Rh  Negative  11/03/22 1300    Antibody Screen  Negative  11/01/22 2014       Negative  08/08/22 1208       Negative  03/16/22 1501    Varicella IgG  805 index 03/16/22 1501    Rubella  16.90 index 03/16/22 1501    Hgb  8.5 g/dL 11/04/22 0537       10.9 g/dL 11/01/22 2014       10.7 g/dL 09/23/22 1345       9.3 g/dL 08/08/22 1208      ^ 12.0 g/dL 04/11/22 1346       13.0 g/dL 03/16/22 1501    Hct  24.5 % 11/04/22 0537       32.1 % 11/01/22 2014       32.0 % 09/23/22 1345       28.2 % 08/08/22 1208      ^ 35.1 % 04/11/22 1346       38.0 % 03/16/22 1501    Glucose Fasting GTT       Glucose Tolerance Test 1 hour       Glucose Tolerance Test 3 hour       Gonorrhea (discrete)  Negative  09/15/22 1051       Negative  03/16/22 1501    Chlamydia (discrete)  Negative  09/15/22 1051       Negative  03/16/22 1501    RPR  Non Reactive  03/16/22 1501    VDRL       Syphilis Antibody       HBsAg  Negative  03/16/22 1501    Herpes Simplex Virus PCR       Herpes Simplex VIrus Culture       HIV  Non Reactive  03/16/22 1501    Hep C RNA Quant PCR       Hep C Antibody  <0.1 s/co ratio 03/16/22 1501    AFP       Group B Strep  Positive  10/10/22 1000    GBS Susceptibility to Clindamycin       GBS Susceptibility to Erythromycin       Fetal Fibronectin       Genetic Testing, Maternal Blood             Drug Screening     Test Value Date Time    Urine Drug Screen       Amphetamine Screen  Negative ng/mL 03/16/22 1501    Barbiturate Screen  Negative ng/mL 03/16/22 1501    Benzodiazepine Screen  Negative ng/mL 03/16/22 1501    Methadone Screen  Negative ng/mL 03/16/22 1501    Phencyclidine Screen  Negative ng/mL 03/16/22 1501    Opiates Screen       THC Screen       Cocaine Screen       Propoxyphene Screen  Negative ng/mL 03/16/22 1501    Buprenorphine Screen       Methamphetamine Screen       Oxycodone Screen       Tricyclic Antidepressants Screen              Legend    ^: Historical                          Kade Petit MD  11/05/22  09:21 EDT

## 2022-11-05 NOTE — PROGRESS NOTES
Postpartum Vaginal Delivery Note PPD# 2    CC: post vaginal delivery    Subjective:  The patient is taking Motrin and Percocet for pain.  She has some soreness if she takes the Motrin only.  She feels ready to go home today.  She is tolerating regular diet and ambulating.  Bleeding is normal    ROS:  No leg pain.    Vitals:   Patient Vitals for the past 24 hrs:   BP Temp Temp src Pulse Resp   11/05/22 0723 124/80 98.1 °F (36.7 °C) Oral 79 18   11/05/22 0718 113/73 98 °F (36.7 °C) Oral 70 18   11/04/22 2316 100/64 97.7 °F (36.5 °C) Oral 79 18   11/04/22 1500 106/69 97.8 °F (36.6 °C) Oral 90 18         Exam:   Gen: NAD, cooperative, conversive  Resp: Nonlabored breathing  Abd: Soft, nondistended, fundus is firm below umbillicus, approp tender  CV: Trace LE edema  Ext: Nontender bilaterally  Labs:  Recent Results (from the past 36 hour(s))   CBC Auto Differential    Collection Time: 11/04/22  5:37 AM    Specimen: Blood   Result Value Ref Range    WBC 13.55 (H) 3.40 - 10.80 10*3/mm3    RBC 2.83 (L) 3.77 - 5.28 10*6/mm3    Hemoglobin 8.5 (L) 12.0 - 15.9 g/dL    Hematocrit 24.5 (L) 34.0 - 46.6 %    MCV 86.6 79.0 - 97.0 fL    MCH 30.0 26.6 - 33.0 pg    MCHC 34.7 31.5 - 35.7 g/dL    RDW 15.7 (H) 12.3 - 15.4 %    RDW-SD 49.8 37.0 - 54.0 fl    MPV 11.2 6.0 - 12.0 fL    Platelets 150 140 - 450 10*3/mm3    Neutrophil % 77.9 (H) 42.7 - 76.0 %    Lymphocyte % 13.7 (L) 19.6 - 45.3 %    Monocyte % 6.7 5.0 - 12.0 %    Eosinophil % 1.0 0.3 - 6.2 %    Basophil % 0.2 0.0 - 1.5 %    Immature Grans % 0.5 0.0 - 0.5 %    Neutrophils, Absolute 10.56 (H) 1.70 - 7.00 10*3/mm3    Lymphocytes, Absolute 1.85 0.70 - 3.10 10*3/mm3    Monocytes, Absolute 0.91 (H) 0.10 - 0.90 10*3/mm3    Eosinophils, Absolute 0.13 0.00 - 0.40 10*3/mm3    Basophils, Absolute 0.03 0.00 - 0.20 10*3/mm3    Immature Grans, Absolute 0.07 (H) 0.00 - 0.05 10*3/mm3    nRBC 0.0 0.0 - 0.2 /100 WBC       Patient Active Problem List   Diagnosis   • Closed left ankle fracture,  with routine healing, subsequent encounter   • HSV-1 infection   • Marijuana use   • Rh negative, antepartum   • Nausea and vomiting in pregnancy   • UTI (urinary tract infection) during pregnancy   • Maternal anemia in pregnancy, antepartum   • LGA (large for gestational age) fetus affecting management of mother   • Excessive weight gain affecting pregnancy   • GBS (group B Streptococcus carrier), +RV culture, currently pregnant   • Shoulder dystocia, delivered   • Vaginal delivery   • PPH (postpartum hemorrhage)         Assessment and Plan:  Paulette Martinez is a 22 y.o. female  s/p   Recommend twice daily iron for anemia with hemoglobin of 8.5.  Discussed risk of narcotics.  Recommend taking sparingly and mostly take Motrin and Tylenol.    1. Doing well, d/c home today  2. Precautions given-pelvic rest for 6 weeks  3. RTC in 6 weeks.   4. Ambulation encouraged.         Signed By:  Kade Petit MD       2022 09:07 EDT

## 2022-11-07 NOTE — PROGRESS NOTES
Continued Stay Note  [facility]     Patient Name: Paulette Martinez  MRN: [id number]  Today's Date: 11/7/2022    Admit Date: 11/1/2022    Plan: Infant may discharge to mother when medically ready; CSW will follow cord. JOBY Go.   Discharge Plan     Row Name 11/07/22 1225       Plan    Plan Comments Mother: Paulette Martinez, MRN: [id number]; infant: Sergo Martinez, MRN: [id number]. CSW has reviewed infant’s cord toxicology results and infant’s cord was negative for substances. Mandated CPS reporting is not required at this time. CSW will send cord toxicology results to infant’s CPS worker. JOBY Go.               Discharge Codes    No documentation.               Expected Discharge Date and Time     Expected Discharge Date Expected Discharge Time    Nov 5, 2022             FADY George

## 2022-11-11 ENCOUNTER — PATIENT OUTREACH (OUTPATIENT)
Dept: LABOR AND DELIVERY | Facility: HOSPITAL | Age: 22
End: 2022-11-11

## 2022-11-11 NOTE — OUTREACH NOTE
Motherhood Connection  Postpartum Check-In    Questions/Answers    Flowsheet Row Responses   Visit Setting Telephone   Best Method for Contacting Cell   OB Discharge Note Reviewed  Reviewed   OB Discharge Medications Reviewed  Reviewed    discharged home with mother? Yes   Current Pain Levels 0-10 3   At Rest Pain Levels 0-10 3   Pain level with activity 0-10 6   Acceptable Pain Level 0-10 3   Pain Location Perineum   Pain Description Sore   How do you treat your pain? Medications   Verbalized Emotional State Acceptance   Family/Support Network Significant Other   Level of Involvement in Care Attentive, Interactive, Supportive   Do you feel comfortable in your relationship with your baby? Yes   Have members of your household adjusted to your baby? Yes   Is the baby's father supportive and/or involved with the baby? Yes   How does your partner feel about the baby? Happy, Involved   Do you feel safe at home, school and work? Yes   Are you in a relationship with someone who threatens you or hurts you? No   Do you have the resources to keep yourself and your baby healthy and safe? Yes   Lochia (per patient report) Rubra, Brown-elinor Red   Amount Scant   Number of pads per day 3   Lochia Odor None   Is patient breastfeeding? Yes, pumping   pumping - Left Breast Soft, Nontender   Pumping - Right Breast Soft, Nontender   Pumping - Left Nipple Intact   Pumping - Right Nipple Intact   Frequency every 2-4 hours   Pumping Quantity 3.5 oz   Storage of Milk freezing extra   Followup Appointments Made Yes   Well Child Visit Appointments Made Yes   Appointment Date 22   Provider/Agency Dr. Metz   Well Child Checkup Provider Name [facility]   Well Child Check Up Date: 22   Did you complete the visit? Yes   Were there any specific concerns? No   Umbilical Cord No reported signs or symptoms   Was the baby circumcised? Yes   Circumcision care and signs/symptoms to report Reviewed   Infant Feeding Method  Breast, Expressed Breast Milk   Breastfeeding Right   Time breastfeeding left: 10   Time breastfeeding right: 10   Frequency of feedings 2-4 oz   Number of wet diapers x 24 hours 8   Last BM x 24 hours 6   Emesis (Unmeasured Occurence) 0   What safe sleep surface is available? Bassinet   Are there stuffed animals, toys, pillows, quilts, blankets, wedges, positioners, bumpers or other loose bedding in the infant's sleeping environment? No   Where does the baby usually sleep? Bassinet   Does the baby ever share a sleep surface with a sibling, adult or pet? No   Does the baby ever share a sleep surface in a bed, couch, recliner or other? No   What position do you place your baby to sleep for naps? Back   What position do you place your baby to sleep at night Back   Are you and/or other caregivers smoking inside or outside the baby's home? No   Is the infant dressed appropiately for the temperature of the home? Yes   Do you use a clean, dry pacifier that is not attached to a string or stuffed animal? Yes          Pt and infant doing well, no resource needs at this time. Sent to RN call center.     [place]  Depression Score: 3 (2022  1:07 PM)      Stephany Thomas RN  Maternity Nurse Navigator    2022, 13:24 EST

## 2022-11-18 ENCOUNTER — PATIENT OUTREACH (OUTPATIENT)
Dept: CALL CENTER | Facility: HOSPITAL | Age: 22
End: 2022-11-18

## 2022-11-18 NOTE — OUTREACH NOTE
Motherhood Connection Survey    Flowsheet Row Responses   [facility] facility patient discharged from? [place]   Week 1 attempt successful? No   Unsuccessful attempts Attempt 2   Reschedule Tomorrow            DIANDRA STALEY - Registered Nurse

## 2022-11-18 NOTE — OUTREACH NOTE
Motherhood Connection Survey    Flowsheet Row Responses   [facility] facility patient discharged from[facility]   Week 1 attempt successful? No   Unsuccessful attempts Attempt 1   Reschedule Today            DIANDRA STALEY - Registered Nurse

## 2022-11-21 ENCOUNTER — PATIENT OUTREACH (OUTPATIENT)
Dept: CALL CENTER | Facility: HOSPITAL | Age: 22
End: 2022-11-21

## 2022-11-21 NOTE — OUTREACH NOTE
Motherhood Connection Survey    Flowsheet Row Responses   [facility] facility patient discharged from? [place]   Week 1 attempt successful? No   Unsuccessful attempts Attempt 3   Revoke Decline to participate            BRYCE STALEY - Licensed Nurse

## 2022-12-02 ENCOUNTER — TELEPHONE (OUTPATIENT)
Dept: OBSTETRICS AND GYNECOLOGY | Age: 22
End: 2022-12-02

## 2022-12-02 NOTE — TELEPHONE ENCOUNTER
Patient called to canceled Postpartum appointment for today, pt wanting to reschedule only with  no available spots, Please advise where to schedule patient.

## 2022-12-14 ENCOUNTER — POSTPARTUM VISIT (OUTPATIENT)
Dept: OBSTETRICS AND GYNECOLOGY | Age: 22
End: 2022-12-14

## 2022-12-14 VITALS
DIASTOLIC BLOOD PRESSURE: 64 MMHG | HEIGHT: 65 IN | BODY MASS INDEX: 25.66 KG/M2 | SYSTOLIC BLOOD PRESSURE: 108 MMHG | WEIGHT: 154 LBS

## 2022-12-14 DIAGNOSIS — Z30.011 ENCOUNTER FOR INITIAL PRESCRIPTION OF CONTRACEPTIVE PILLS: ICD-10-CM

## 2022-12-14 PROBLEM — Z67.91 RH NEGATIVE, ANTEPARTUM: Status: RESOLVED | Noted: 2022-03-30 | Resolved: 2022-12-14

## 2022-12-14 PROBLEM — O99.019 MATERNAL ANEMIA IN PREGNANCY, ANTEPARTUM: Status: RESOLVED | Noted: 2022-08-09 | Resolved: 2022-12-14

## 2022-12-14 PROBLEM — O26.00 EXCESSIVE WEIGHT GAIN AFFECTING PREGNANCY: Status: RESOLVED | Noted: 2022-09-23 | Resolved: 2022-12-14

## 2022-12-14 PROBLEM — F12.90 MARIJUANA USE: Status: RESOLVED | Noted: 2022-03-30 | Resolved: 2022-12-14

## 2022-12-14 PROBLEM — O99.820 GBS (GROUP B STREPTOCOCCUS CARRIER), +RV CULTURE, CURRENTLY PREGNANT: Status: RESOLVED | Noted: 2022-10-14 | Resolved: 2022-12-14

## 2022-12-14 PROBLEM — B00.9 HSV-1 INFECTION: Status: RESOLVED | Noted: 2020-11-17 | Resolved: 2022-12-14

## 2022-12-14 PROBLEM — O26.899 RH NEGATIVE, ANTEPARTUM: Status: RESOLVED | Noted: 2022-03-30 | Resolved: 2022-12-14

## 2022-12-14 PROBLEM — O21.9 NAUSEA AND VOMITING IN PREGNANCY: Status: RESOLVED | Noted: 2022-04-15 | Resolved: 2022-12-14

## 2022-12-14 PROBLEM — O23.40 UTI (URINARY TRACT INFECTION) DURING PREGNANCY: Status: RESOLVED | Noted: 2022-04-15 | Resolved: 2022-12-14

## 2022-12-14 PROBLEM — O36.60X0 LGA (LARGE FOR GESTATIONAL AGE) FETUS AFFECTING MANAGEMENT OF MOTHER: Status: RESOLVED | Noted: 2022-08-22 | Resolved: 2022-12-14

## 2022-12-14 PROCEDURE — 0503F POSTPARTUM CARE VISIT: CPT | Performed by: OBSTETRICS & GYNECOLOGY

## 2022-12-14 RX ORDER — ACETAMINOPHEN AND CODEINE PHOSPHATE 120; 12 MG/5ML; MG/5ML
1 SOLUTION ORAL DAILY
Qty: 84 TABLET | Refills: 3 | Status: SHIPPED | OUTPATIENT
Start: 2022-12-14 | End: 2023-12-14

## 2022-12-14 NOTE — PROGRESS NOTES
"Jody Martinez is a 22 y.o. female who presents for a postpartum visit. She is 6 weeks postpartum following a spontaneous vaginal delivery. I have fully reviewed the prenatal and intrapartum course. The delivery was at 39-6 gestational weeks. Outcome: spontaneous vaginal delivery. Anesthesia: epidural. Postpartum course has been uncomplicated. Baby's course has been uncomplicated. Baby is feeding by breast. Bleeding thin lochia. Bowel function is normal. Bladder function is normal. Patient is not sexually active. Contraception method is oral progesterone-only contraceptive. Postpartum depression screening: negative.    The following portions of the patient's history were reviewed and updated as appropriate: allergies, current medications, past family history, past medical history, past social history, past surgical history and problem list.    Review of Systems  Pertinent items are noted in HPI.    Objective   /64   Ht 165.1 cm (65\")   Wt 69.9 kg (154 lb)   LMP 12/30/2021 (Exact Date)   Breastfeeding Yes   BMI 25.63 kg/m²    General:  alert, appears stated age and cooperative    Vulva:  normal   Vagina: normal vagina, no discharge, exudate, lesion, or erythema   Assessment & Plan   postpartum exam. Pap smear not done at today's visit.    1. Contraception: oral progesterone-only contraceptive  2. Perineum intact   3. Follow up in: 8 months or as needed.    Counseling was given to patient for the following topics: instructions for management, impressions and risks and benefits of treatment options . Total time of the encounter was 20 minutes and 15 minutes was spent counseling.         Answers for HPI/ROS submitted by the patient on 12/12/2022  Please describe your symptoms.: pain having bowel movements and still having pain when i go pee and wipe where i had the perineal cut at.  Have you had these symptoms before?: No  How long have you been having these symptoms?: Greater than 2 " weeks  Please describe any probable cause for these symptoms. : pregnancy  What is the primary reason for your visit?: Other

## 2023-01-13 ENCOUNTER — OFFICE VISIT (OUTPATIENT)
Dept: OBSTETRICS AND GYNECOLOGY | Age: 23
End: 2023-01-13
Payer: COMMERCIAL

## 2023-01-13 VITALS
WEIGHT: 166 LBS | BODY MASS INDEX: 27.66 KG/M2 | DIASTOLIC BLOOD PRESSURE: 68 MMHG | SYSTOLIC BLOOD PRESSURE: 118 MMHG | HEIGHT: 65 IN

## 2023-01-13 DIAGNOSIS — N89.8 VAGINAL DISCHARGE: Primary | ICD-10-CM

## 2023-01-13 PROCEDURE — 99213 OFFICE O/P EST LOW 20 MIN: CPT | Performed by: NURSE PRACTITIONER

## 2023-01-13 RX ORDER — FLUCONAZOLE 150 MG/1
TABLET ORAL
Qty: 2 TABLET | Refills: 0 | Status: SHIPPED | OUTPATIENT
Start: 2023-01-13

## 2023-01-13 NOTE — PROGRESS NOTES
"Subjective     Chief Complaint   Patient presents with   • Gynecologic Exam     Vaginal discharge, itching       Paulette Martinez is a 22 y.o.  whose LMP is No LMP recorded.     Pt presents today with chief complaint of vaginal discharge and itching x 2 days  Noticed some white discharge  She is SA with same partner, denies std risk  No dysuria, frequency or pelvic pain  She has not used anything OTC    No Additional Complaints Reported    The following portions of the patient's history were reviewed and updated as appropriate:vital signs, allergies, current medications, past medical history, past social history, past surgical history and problem list      Review of Systems   Pertinent items are noted in HPI.     Objective      /68   Ht 165.1 cm (65\")   Wt 75.3 kg (166 lb)   Breastfeeding Yes   BMI 27.62 kg/m²     Physical Exam    General:   alert and no distress   Heart: Not performed today   Lungs: Not performed today.   Breast: Not performed today   Neck: na   Abdomen: {Not performed today   CVA: Not performed today   Pelvis: External genitalia: normal general appearance  Urinary system: urethral meatus normal  Vaginal: normal mucosa without prolapse or lesions, normal without tenderness, induration or masses and normal rugae  Cervix: normal appearance   Extremities: Not performed today   Neurologic: negative   Psychiatric: Normal affect, judgement, and mood       Lab Review   Labs: No data reviewed     Imaging   No data reviewed    Assessment & Plan     ASSESSMENT  1. Vaginal discharge        PLAN  1.   Orders Placed This Encounter   Procedures   • NuSwab VG+ - Swab, Vagina       2. Medications prescribed this encounter:        New Medications Ordered This Visit   Medications   • fluconazole (DIFLUCAN) 150 MG tablet     Sig: TAKE ONE TABLET TODAY AND REPEAT IN THREE DAYS     Dispense:  2 tablet     Refill:  0       3. Will treat for yeast. Swab sent. Will call with results.    Follow up: " PRN    Mague Adkins, APRN  1/13/2023

## 2023-01-17 LAB
A VAGINAE DNA VAG QL NAA+PROBE: ABNORMAL SCORE
BVAB2 DNA VAG QL NAA+PROBE: ABNORMAL SCORE
C ALBICANS DNA VAG QL NAA+PROBE: NEGATIVE
C GLABRATA DNA VAG QL NAA+PROBE: NEGATIVE
C TRACH DNA VAG QL NAA+PROBE: NEGATIVE
MEGA1 DNA VAG QL NAA+PROBE: ABNORMAL SCORE
N GONORRHOEA DNA VAG QL NAA+PROBE: NEGATIVE
T VAGINALIS DNA VAG QL NAA+PROBE: NEGATIVE

## 2023-01-17 RX ORDER — METRONIDAZOLE 500 MG/1
500 TABLET ORAL 2 TIMES DAILY
Qty: 14 TABLET | Refills: 0 | Status: SHIPPED | OUTPATIENT
Start: 2023-01-17 | End: 2023-01-24

## 2023-09-04 RX ORDER — PROMETHAZINE HYDROCHLORIDE 25 MG/1
25 TABLET ORAL EVERY 6 HOURS PRN
Qty: 30 TABLET | Refills: 2 | Status: SHIPPED | OUTPATIENT
Start: 2023-09-04

## 2023-09-04 NOTE — PROGRESS NOTES
Patient called on-call line to report persistent nausea and vomiting.  She is not currently taking anything for nausea.  She recently found out she was pregnant and has an appointment in 2 weeks to see Dr. Metz.  Rx Phenergan sent.  Discussed coming to the hospital if she is unable to tolerate any p.o. intake for more than 24 hours.

## 2023-09-13 ENCOUNTER — TELEPHONE (OUTPATIENT)
Dept: OBSTETRICS AND GYNECOLOGY | Age: 23
End: 2023-09-13
Payer: COMMERCIAL

## 2023-09-13 PROBLEM — O21.9 NAUSEA AND VOMITING DURING PREGNANCY: Status: ACTIVE | Noted: 2023-09-13

## 2023-09-13 RX ORDER — ONDANSETRON HYDROCHLORIDE 8 MG/1
4 TABLET, FILM COATED ORAL EVERY 6 HOURS PRN
Qty: 15 TABLET | Refills: 1 | Status: SHIPPED | OUTPATIENT
Start: 2023-09-13 | End: 2023-09-28

## 2023-09-13 NOTE — TELEPHONE ENCOUNTER
Phenergan not working , pt seen at [facility] on 9/6, approx 7 eweeks preg, seen for unable to keep anything down. She was given 6 Zofran to take, pt states Zofran really helped nausea , pt has taken all 6 and asking for refills

## 2023-09-18 ENCOUNTER — OFFICE VISIT (OUTPATIENT)
Dept: OBSTETRICS AND GYNECOLOGY | Age: 23
End: 2023-09-18
Payer: COMMERCIAL

## 2023-09-18 VITALS
HEIGHT: 65 IN | WEIGHT: 148 LBS | BODY MASS INDEX: 24.66 KG/M2 | SYSTOLIC BLOOD PRESSURE: 102 MMHG | DIASTOLIC BLOOD PRESSURE: 64 MMHG

## 2023-09-18 DIAGNOSIS — Z13.89 SCREENING FOR BLOOD OR PROTEIN IN URINE: ICD-10-CM

## 2023-09-18 DIAGNOSIS — Z32.00 VISIT FOR CONFIRMATION OF PREGNANCY TEST RESULT WITH PHYSICAL EXAM: Primary | ICD-10-CM

## 2023-09-18 DIAGNOSIS — Z34.90 UNPLANNED PREGNANCY: ICD-10-CM

## 2023-09-18 LAB
BILIRUB BLD-MCNC: NEGATIVE MG/DL
GLUCOSE UR STRIP-MCNC: NEGATIVE MG/DL
KETONES UR QL: ABNORMAL
LEUKOCYTE EST, POC: NEGATIVE
NITRITE UR-MCNC: NEGATIVE MG/ML
PH UR: 6.5 [PH] (ref 5–8)
PROT UR STRIP-MCNC: NEGATIVE MG/DL
RBC # UR STRIP: NEGATIVE /UL
SP GR UR: 1.01 (ref 1–1.03)
UROBILINOGEN UR QL: NORMAL

## 2023-09-18 RX ORDER — PRENATAL VIT/IRON FUM/FOLIC AC 27MG-0.8MG
TABLET ORAL DAILY
COMMUNITY

## 2023-09-18 NOTE — PROGRESS NOTES
"Jody Martinez is a 23 y.o. female Cc: pregnancy confirmation with US today , unknown LMP , nausea and vomiting  , no pap on file was originally scheduled for pap today , denies any vag spotting or abdominal cramping.  Patient presents with her boyfriend.  This was an unplanned pregnancy.  Discussed options including termination with patient and boyfriend.  Told them to contact me for any needed guidance or support.  We will see them back in 3 weeks.    History of Present Illness    The following portions of the patient's history were reviewed and updated as appropriate: allergies, current medications, past family history, past medical history, past social history, past surgical history, and problem list.    Review of Systems   Constitutional:  Negative for chills, fatigue and fever.   Gastrointestinal:  Positive for nausea and vomiting. Negative for abdominal distention and abdominal pain.   Genitourinary:  Negative for dysuria, menstrual problem, pelvic pain, vaginal bleeding, vaginal discharge and vaginal pain.   All other systems reviewed and are negative.  /64   Ht 165.1 cm (65\")   Wt 67.1 kg (148 lb)   LMP  (LMP Unknown)   Breastfeeding No   BMI 24.63 kg/m²     Objective   Physical Exam  Vitals and nursing note reviewed.   Constitutional:       Appearance: Normal appearance. She is normal weight.   Pulmonary:      Effort: Pulmonary effort is normal.   Neurological:      Mental Status: She is alert and oriented to person, place, and time.   Psychiatric:         Mood and Affect: Mood normal.         Behavior: Behavior normal.         Assessment & Plan   Diagnoses and all orders for this visit:    1. Visit for confirmation of pregnancy test result with physical exam (Primary)  -     ABO / Rh  -     Antibody Screen  -     CBC & Differential  -     Hepatitis B Surface Antigen  -     Hepatitis C Antibody  -     HIV-1 / O / 2 Ag / Antibody  -     RPR  -     Rubella Antibody, IgG  -     Urine " Culture - Urine, Urine, Clean Catch  -     Varicella Zoster Antibody, IgG  -     Chlamydia trachomatis, Neisseria gonorrhoeae, Trichomonas vaginalis, PCR - Urine, Urine, Clean Catch    2. Screening for blood or protein in urine  -     POC Urinalysis Dipstick    3. Unplanned pregnancy        Counseling was given to patient and boyfriend  for the following topics: instructions for management, impressions, risks and benefits of treatment options, and importance of treatment compliance . Total time of the encounter was 30 minutes and 25 minutes was spent counseling.         Return in about 3 weeks (around 10/9/2023), or ob intake.

## 2023-09-19 ENCOUNTER — TELEPHONE (OUTPATIENT)
Dept: OBSTETRICS AND GYNECOLOGY | Age: 23
End: 2023-09-19
Payer: COMMERCIAL

## 2023-09-19 ENCOUNTER — PATIENT MESSAGE (OUTPATIENT)
Dept: OBSTETRICS AND GYNECOLOGY | Age: 23
End: 2023-09-19
Payer: COMMERCIAL

## 2023-09-19 NOTE — TELEPHONE ENCOUNTER
From: Paulette Martinez  To: Cristy Metz  Sent: 9/19/2023 10:42 AM EDT  Subject: termination     Good morning Dr. Metz, i’ve decided to move forward with the termination of my pregnancy.

## 2023-09-19 NOTE — TELEPHONE ENCOUNTER
Regarding: termination   Contact: [phone]  ----- Message from Kathie Vidales sent at 9/19/2023 11:03 AM EDT -----       ----- Message from Paulette Martinez to Isaías, Cristy Sanchez MD sent at 9/19/2023 10:42 AM -----   Good morning Dr. Metz, i’ve decided to move forward with the termination of my pregnancy.

## 2023-09-19 NOTE — TELEPHONE ENCOUNTER
Please tell her to let us know if she needs anything from us.  If not please schedule a follow-up with me 2 weeks after termination with transvaginal ultrasound.

## 2023-09-22 NOTE — TELEPHONE ENCOUNTER
Pt called and stated she is scheduled for October 3rd and two weeks post op would be around the 17th of October, please schedule

## 2023-10-18 ENCOUNTER — OFFICE VISIT (OUTPATIENT)
Dept: OBSTETRICS AND GYNECOLOGY | Age: 23
End: 2023-10-18
Payer: COMMERCIAL

## 2023-10-18 VITALS
BODY MASS INDEX: 24.66 KG/M2 | HEIGHT: 65 IN | WEIGHT: 148 LBS | SYSTOLIC BLOOD PRESSURE: 110 MMHG | DIASTOLIC BLOOD PRESSURE: 70 MMHG

## 2023-10-18 DIAGNOSIS — O03.9 COMPLETE ABORTION: ICD-10-CM

## 2023-10-18 DIAGNOSIS — O03.4 RETAINED PRODUCTS OF CONCEPTION FOLLOWING ABORTION: Primary | ICD-10-CM

## 2023-10-18 PROBLEM — Z34.90 UNPLANNED PREGNANCY: Status: RESOLVED | Noted: 2023-09-18 | Resolved: 2023-10-18

## 2023-10-18 PROBLEM — O21.9 NAUSEA AND VOMITING DURING PREGNANCY: Status: RESOLVED | Noted: 2023-09-13 | Resolved: 2023-10-18

## 2023-10-18 RX ORDER — IBUPROFEN 600 MG/1
600 TABLET ORAL EVERY 6 HOURS PRN
Qty: 10 TABLET | Refills: 0 | Status: SHIPPED | OUTPATIENT
Start: 2023-10-18 | End: 2023-10-23

## 2023-10-18 RX ORDER — MISOPROSTOL 200 UG/1
600 TABLET ORAL EVERY 24 HOURS
Qty: 6 TABLET | Refills: 0 | Status: SHIPPED | OUTPATIENT
Start: 2023-10-18 | End: 2023-10-20

## 2023-10-18 NOTE — PROGRESS NOTES
"Jody Martinez is a 23 y.o. female presents for tvus and follow up after [facility] [place].  Ultrasound today shows 3 cm possible retained products of conception and endometrium.  Patient reports some bleeding off and on but not heavy.  Discussed options including repeat D&C versus Cytotec.  Patient desires medical management.  Return Monday for repeat ultrasound.  Bleeding and fever warnings given.    History of Present Illness    The following portions of the patient's history were reviewed and updated as appropriate: allergies, current medications, past family history, past medical history, past social history, past surgical history, and problem list.    Review of Systems   Constitutional:  Negative for chills, fatigue and fever.   Gastrointestinal:  Negative for abdominal pain.   Genitourinary:  Positive for vaginal bleeding. Negative for dysuria and pelvic pain.   All other systems reviewed and are negative.    /70   Ht 165.1 cm (65\")   Wt 67.1 kg (148 lb)   LMP 10/05/2023   Breastfeeding No   BMI 24.63 kg/m²     Objective   Physical Exam  Vitals and nursing note reviewed.   Constitutional:       Appearance: Normal appearance. She is normal weight.   Pulmonary:      Effort: Pulmonary effort is normal.   Neurological:      Mental Status: She is alert and oriented to person, place, and time.   Psychiatric:         Mood and Affect: Mood normal.         Behavior: Behavior normal.           Assessment & Plan   Diagnoses and all orders for this visit:    1. Retained products of conception following  (Primary)  -     miSOPROStol (Cytotec) 200 MCG tablet; Take 3 tablets by mouth Daily for 2 doses.  Dispense: 6 tablet; Refill: 0  -     ibuprofen (ADVIL,MOTRIN) 600 MG tablet; Take 1 tablet by mouth Every 6 (Six) Hours As Needed for Mild Pain.  Dispense: 10 tablet; Refill: 0    2. Complete   -     US Non-ob " Transvaginal      Counseling was given to patient for the following topics: diagnostic results, instructions for management, impressions, risks and benefits of treatment options, and importance of treatment compliance . Total time of the encounter was 30 minutes and 25 minutes was spent counseling, exclusive of ultrasound results.  No follow-ups on file.

## 2023-10-23 ENCOUNTER — OFFICE VISIT (OUTPATIENT)
Dept: OBSTETRICS AND GYNECOLOGY | Age: 23
End: 2023-10-23
Payer: COMMERCIAL

## 2023-10-23 ENCOUNTER — PREP FOR SURGERY (OUTPATIENT)
Dept: OTHER | Facility: HOSPITAL | Age: 23
End: 2023-10-23
Payer: COMMERCIAL

## 2023-10-23 VITALS
SYSTOLIC BLOOD PRESSURE: 108 MMHG | BODY MASS INDEX: 24.66 KG/M2 | WEIGHT: 148 LBS | DIASTOLIC BLOOD PRESSURE: 70 MMHG | HEIGHT: 65 IN

## 2023-10-23 DIAGNOSIS — O03.4 RETAINED PRODUCTS OF CONCEPTION FOLLOWING ABORTION: Primary | ICD-10-CM

## 2023-10-23 DIAGNOSIS — Z13.9 SPECIAL SCREENING: ICD-10-CM

## 2023-10-23 LAB
B-HCG UR QL: POSITIVE
EXPIRATION DATE: ABNORMAL
INTERNAL NEGATIVE CONTROL: NEGATIVE
INTERNAL POSITIVE CONTROL: ABNORMAL
Lab: ABNORMAL

## 2023-10-23 RX ORDER — SCOLOPAMINE TRANSDERMAL SYSTEM 1 MG/1
1 PATCH, EXTENDED RELEASE TRANSDERMAL CONTINUOUS
Status: CANCELLED | OUTPATIENT
Start: 2023-10-25 | End: 2023-10-28

## 2023-10-23 RX ORDER — GABAPENTIN 300 MG/1
600 CAPSULE ORAL ONCE
Status: CANCELLED | OUTPATIENT
Start: 2023-10-25 | End: 2023-10-23

## 2023-10-23 RX ORDER — ACETAMINOPHEN 500 MG
1000 TABLET ORAL ONCE
Status: CANCELLED | OUTPATIENT
Start: 2023-10-25 | End: 2023-10-23

## 2023-10-23 RX ORDER — SODIUM CHLORIDE 0.9 % (FLUSH) 0.9 %
10 SYRINGE (ML) INJECTION AS NEEDED
Status: CANCELLED | OUTPATIENT
Start: 2023-10-25

## 2023-10-23 RX ORDER — DOXYCYCLINE 100 MG/1
100 CAPSULE ORAL ONCE
Status: CANCELLED | OUTPATIENT
Start: 2023-10-25 | End: 2023-10-23

## 2023-10-23 RX ORDER — SODIUM CHLORIDE 0.9 % (FLUSH) 0.9 %
10 SYRINGE (ML) INJECTION EVERY 12 HOURS SCHEDULED
Status: CANCELLED | OUTPATIENT
Start: 2023-10-25

## 2023-10-23 RX ORDER — SODIUM CHLORIDE 9 MG/ML
40 INJECTION, SOLUTION INTRAVENOUS AS NEEDED
Status: CANCELLED | OUTPATIENT
Start: 2023-10-25

## 2023-10-23 NOTE — PROGRESS NOTES
"Jody Martinez is a 23 y.o. female presents for tvus and follow up after medical management 2 rounds of cytotec  bleeding started heavier Thursday evening same throughout the weekend yesterday noticed less bleeding today just having light brownish spotting.  Ultrasound today still shows complex endometrial with suspected retained products of conception measuring 3.5 x 1.5 cm.  Discussed options with patient and recommendation of suction D&C at this point.  Patient agrees with plan.  Discussed risk of perforation, infection, and heavy bleeding.  Patient expressed understanding and desires to proceed.    History of Present Illness    The following portions of the patient's history were reviewed and updated as appropriate: allergies, current medications, past family history, past medical history, past social history, past surgical history, and problem list.    Review of Systems   Constitutional:  Negative for chills and fever.   Gastrointestinal:  Negative for abdominal distention and abdominal pain.   Genitourinary:  Positive for vaginal bleeding. Negative for dyspareunia, dysuria, menstrual problem, pelvic pain, vaginal discharge and vaginal pain.   All other systems reviewed and are negative.  /70   Ht 165.1 cm (65\")   Wt 67.1 kg (148 lb)   LMP 10/05/2023 (Exact Date)   BMI 24.63 kg/m²       Objective   Physical Exam  Vitals and nursing note reviewed.   Constitutional:       Appearance: Normal appearance. She is normal weight.   Pulmonary:      Effort: Pulmonary effort is normal.   Neurological:      Mental Status: She is alert and oriented to person, place, and time.   Psychiatric:         Mood and Affect: Mood normal.         Behavior: Behavior normal.         Assessment & Plan   Diagnoses and all orders for this visit:    1. Retained products of conception following  (Primary)  -     Case Request    2. Special screening  -     POC Pregnancy, Urine         Counseling was given to " patient for the following topics: instructions for management, impressions, risks and benefits of treatment options, and importance of treatment compliance . Total time of the encounter was 30 minutes and 25 minutes was spent counseling exclusive of ultrasound results.

## 2023-10-25 ENCOUNTER — ANESTHESIA (OUTPATIENT)
Dept: PERIOP | Facility: HOSPITAL | Age: 23
End: 2023-10-25
Payer: COMMERCIAL

## 2023-10-25 ENCOUNTER — ANESTHESIA EVENT (OUTPATIENT)
Dept: PERIOP | Facility: HOSPITAL | Age: 23
End: 2023-10-25
Payer: COMMERCIAL

## 2023-10-25 ENCOUNTER — HOSPITAL ENCOUNTER (OUTPATIENT)
Facility: HOSPITAL | Age: 23
Setting detail: HOSPITAL OUTPATIENT SURGERY
Discharge: HOME | End: 2023-10-25
Attending: OBSTETRICS & GYNECOLOGY | Admitting: OBSTETRICS & GYNECOLOGY
Payer: COMMERCIAL

## 2023-10-25 VITALS
DIASTOLIC BLOOD PRESSURE: 82 MMHG | HEART RATE: 62 BPM | BODY MASS INDEX: 24.63 KG/M2 | RESPIRATION RATE: 18 BRPM | SYSTOLIC BLOOD PRESSURE: 119 MMHG | OXYGEN SATURATION: 97 % | TEMPERATURE: 98 F | HEIGHT: 65 IN

## 2023-10-25 DIAGNOSIS — O03.4 RETAINED PRODUCTS OF CONCEPTION FOLLOWING ABORTION: ICD-10-CM

## 2023-10-25 LAB
ABO GROUP BLD: NORMAL
BASOPHILS # BLD AUTO: 0.02 10*3/MM3 (ref 0–0.2)
BASOPHILS NFR BLD AUTO: 0.3 % (ref 0–1.5)
BLD GP AB SCN SERPL QL: POSITIVE
DEPRECATED RDW RBC AUTO: 44.4 FL (ref 37–54)
EOSINOPHIL # BLD AUTO: 0.13 10*3/MM3 (ref 0–0.4)
EOSINOPHIL NFR BLD AUTO: 1.9 % (ref 0.3–6.2)
ERYTHROCYTE [DISTWIDTH] IN BLOOD BY AUTOMATED COUNT: 13.5 % (ref 12.3–15.4)
HCT VFR BLD AUTO: 35.9 % (ref 34–46.6)
HGB BLD-MCNC: 11.8 G/DL (ref 12–15.9)
IMM GRANULOCYTES # BLD AUTO: 0.01 10*3/MM3 (ref 0–0.05)
IMM GRANULOCYTES NFR BLD AUTO: 0.1 % (ref 0–0.5)
LYMPHOCYTES # BLD AUTO: 1.87 10*3/MM3 (ref 0.7–3.1)
LYMPHOCYTES NFR BLD AUTO: 27.5 % (ref 19.6–45.3)
MCH RBC QN AUTO: 29.4 PG (ref 26.6–33)
MCHC RBC AUTO-ENTMCNC: 32.9 G/DL (ref 31.5–35.7)
MCV RBC AUTO: 89.5 FL (ref 79–97)
MONOCYTES # BLD AUTO: 0.56 10*3/MM3 (ref 0.1–0.9)
MONOCYTES NFR BLD AUTO: 8.2 % (ref 5–12)
NEUTROPHILS NFR BLD AUTO: 4.22 10*3/MM3 (ref 1.7–7)
NEUTROPHILS NFR BLD AUTO: 62 % (ref 42.7–76)
NRBC BLD AUTO-RTO: 0 /100 WBC (ref 0–0.2)
PLATELET # BLD AUTO: 274 10*3/MM3 (ref 140–450)
PMV BLD AUTO: 10.3 FL (ref 6–12)
RBC # BLD AUTO: 4.01 10*6/MM3 (ref 3.77–5.28)
RESIDUAL RHIG DETECTED: NORMAL
RH BLD: NEGATIVE
T&S EXPIRATION DATE: NORMAL
WBC NRBC COR # BLD: 6.81 10*3/MM3 (ref 3.4–10.8)

## 2023-10-25 PROCEDURE — 86850 RBC ANTIBODY SCREEN: CPT | Performed by: OBSTETRICS & GYNECOLOGY

## 2023-10-25 PROCEDURE — 85025 COMPLETE CBC W/AUTO DIFF WBC: CPT | Performed by: OBSTETRICS & GYNECOLOGY

## 2023-10-25 PROCEDURE — 25010000002 PROPOFOL 200 MG/20ML EMULSION: Performed by: NURSE ANESTHETIST, CERTIFIED REGISTERED

## 2023-10-25 PROCEDURE — 86901 BLOOD TYPING SEROLOGIC RH(D): CPT | Performed by: OBSTETRICS & GYNECOLOGY

## 2023-10-25 PROCEDURE — 25010000002 DEXAMETHASONE SODIUM PHOSPHATE 20 MG/5ML SOLUTION: Performed by: NURSE ANESTHETIST, CERTIFIED REGISTERED

## 2023-10-25 PROCEDURE — 25010000002 FENTANYL CITRATE (PF) 50 MCG/ML SOLUTION: Performed by: NURSE ANESTHETIST, CERTIFIED REGISTERED

## 2023-10-25 PROCEDURE — 25010000002 ONDANSETRON PER 1 MG: Performed by: NURSE ANESTHETIST, CERTIFIED REGISTERED

## 2023-10-25 PROCEDURE — 25810000003 LACTATED RINGERS PER 1000 ML: Performed by: ANESTHESIOLOGY

## 2023-10-25 PROCEDURE — 88305 TISSUE EXAM BY PATHOLOGIST: CPT | Performed by: OBSTETRICS & GYNECOLOGY

## 2023-10-25 PROCEDURE — 25010000002 METHYLERGONOVINE MALEATE PER 0.2 MG: Performed by: NURSE ANESTHETIST, CERTIFIED REGISTERED

## 2023-10-25 PROCEDURE — 59812 TREATMENT OF MISCARRIAGE: CPT | Performed by: OBSTETRICS & GYNECOLOGY

## 2023-10-25 PROCEDURE — S0260 H&P FOR SURGERY: HCPCS | Performed by: OBSTETRICS & GYNECOLOGY

## 2023-10-25 PROCEDURE — 86900 BLOOD TYPING SEROLOGIC ABO: CPT | Performed by: OBSTETRICS & GYNECOLOGY

## 2023-10-25 PROCEDURE — 86870 RBC ANTIBODY IDENTIFICATION: CPT | Performed by: OBSTETRICS & GYNECOLOGY

## 2023-10-25 RX ORDER — SODIUM CHLORIDE 9 MG/ML
40 INJECTION, SOLUTION INTRAVENOUS AS NEEDED
Status: DISCONTINUED | OUTPATIENT
Start: 2023-10-25 | End: 2023-10-25 | Stop reason: HOSPADM

## 2023-10-25 RX ORDER — DEXAMETHASONE SODIUM PHOSPHATE 4 MG/ML
INJECTION, SOLUTION INTRA-ARTICULAR; INTRALESIONAL; INTRAMUSCULAR; INTRAVENOUS; SOFT TISSUE AS NEEDED
Status: DISCONTINUED | OUTPATIENT
Start: 2023-10-25 | End: 2023-10-25 | Stop reason: SURG

## 2023-10-25 RX ORDER — SODIUM CHLORIDE, SODIUM LACTATE, POTASSIUM CHLORIDE, CALCIUM CHLORIDE 600; 310; 30; 20 MG/100ML; MG/100ML; MG/100ML; MG/100ML
9 INJECTION, SOLUTION INTRAVENOUS CONTINUOUS
Status: DISCONTINUED | OUTPATIENT
Start: 2023-10-25 | End: 2023-10-25 | Stop reason: HOSPADM

## 2023-10-25 RX ORDER — PROMETHAZINE HYDROCHLORIDE 25 MG/1
25 TABLET ORAL ONCE AS NEEDED
Status: DISCONTINUED | OUTPATIENT
Start: 2023-10-25 | End: 2023-10-25 | Stop reason: HOSPADM

## 2023-10-25 RX ORDER — IPRATROPIUM BROMIDE AND ALBUTEROL SULFATE 2.5; .5 MG/3ML; MG/3ML
3 SOLUTION RESPIRATORY (INHALATION) ONCE AS NEEDED
Status: DISCONTINUED | OUTPATIENT
Start: 2023-10-25 | End: 2023-10-25 | Stop reason: HOSPADM

## 2023-10-25 RX ORDER — SODIUM CHLORIDE 0.9 % (FLUSH) 0.9 %
3 SYRINGE (ML) INJECTION EVERY 12 HOURS SCHEDULED
Status: DISCONTINUED | OUTPATIENT
Start: 2023-10-25 | End: 2023-10-25 | Stop reason: HOSPADM

## 2023-10-25 RX ORDER — PROPOFOL 10 MG/ML
INJECTION, EMULSION INTRAVENOUS AS NEEDED
Status: DISCONTINUED | OUTPATIENT
Start: 2023-10-25 | End: 2023-10-25 | Stop reason: SURG

## 2023-10-25 RX ORDER — HYDROCODONE BITARTRATE AND ACETAMINOPHEN 5; 325 MG/1; MG/1
1 TABLET ORAL ONCE AS NEEDED
Status: DISCONTINUED | OUTPATIENT
Start: 2023-10-25 | End: 2023-10-25 | Stop reason: HOSPADM

## 2023-10-25 RX ORDER — SODIUM CHLORIDE 0.9 % (FLUSH) 0.9 %
10 SYRINGE (ML) INJECTION EVERY 12 HOURS SCHEDULED
Status: DISCONTINUED | OUTPATIENT
Start: 2023-10-25 | End: 2023-10-25 | Stop reason: HOSPADM

## 2023-10-25 RX ORDER — ACETAMINOPHEN 500 MG
1000 TABLET ORAL ONCE
Status: COMPLETED | OUTPATIENT
Start: 2023-10-25 | End: 2023-10-25

## 2023-10-25 RX ORDER — SODIUM CHLORIDE 0.9 % (FLUSH) 0.9 %
10 SYRINGE (ML) INJECTION AS NEEDED
Status: DISCONTINUED | OUTPATIENT
Start: 2023-10-25 | End: 2023-10-25 | Stop reason: HOSPADM

## 2023-10-25 RX ORDER — LIDOCAINE HYDROCHLORIDE 10 MG/ML
0.5 INJECTION, SOLUTION INFILTRATION; PERINEURAL ONCE AS NEEDED
Status: DISCONTINUED | OUTPATIENT
Start: 2023-10-25 | End: 2023-10-25 | Stop reason: HOSPADM

## 2023-10-25 RX ORDER — HYDROMORPHONE HYDROCHLORIDE 1 MG/ML
0.25 INJECTION, SOLUTION INTRAMUSCULAR; INTRAVENOUS; SUBCUTANEOUS
Status: DISCONTINUED | OUTPATIENT
Start: 2023-10-25 | End: 2023-10-25 | Stop reason: HOSPADM

## 2023-10-25 RX ORDER — DROPERIDOL 2.5 MG/ML
0.62 INJECTION, SOLUTION INTRAMUSCULAR; INTRAVENOUS
Status: DISCONTINUED | OUTPATIENT
Start: 2023-10-25 | End: 2023-10-25 | Stop reason: HOSPADM

## 2023-10-25 RX ORDER — LABETALOL HYDROCHLORIDE 5 MG/ML
5 INJECTION, SOLUTION INTRAVENOUS
Status: DISCONTINUED | OUTPATIENT
Start: 2023-10-25 | End: 2023-10-25 | Stop reason: HOSPADM

## 2023-10-25 RX ORDER — SODIUM CHLORIDE 0.9 % (FLUSH) 0.9 %
3-10 SYRINGE (ML) INJECTION AS NEEDED
Status: DISCONTINUED | OUTPATIENT
Start: 2023-10-25 | End: 2023-10-25 | Stop reason: HOSPADM

## 2023-10-25 RX ORDER — GABAPENTIN 300 MG/1
600 CAPSULE ORAL ONCE
Status: COMPLETED | OUTPATIENT
Start: 2023-10-25 | End: 2023-10-25

## 2023-10-25 RX ORDER — FAMOTIDINE 10 MG/ML
20 INJECTION, SOLUTION INTRAVENOUS ONCE
Status: COMPLETED | OUTPATIENT
Start: 2023-10-25 | End: 2023-10-25

## 2023-10-25 RX ORDER — DEXMEDETOMIDINE HYDROCHLORIDE 100 UG/ML
INJECTION, SOLUTION INTRAVENOUS AS NEEDED
Status: DISCONTINUED | OUTPATIENT
Start: 2023-10-25 | End: 2023-10-25 | Stop reason: SURG

## 2023-10-25 RX ORDER — HYDRALAZINE HYDROCHLORIDE 20 MG/ML
5 INJECTION INTRAMUSCULAR; INTRAVENOUS
Status: DISCONTINUED | OUTPATIENT
Start: 2023-10-25 | End: 2023-10-25 | Stop reason: HOSPADM

## 2023-10-25 RX ORDER — ONDANSETRON 2 MG/ML
4 INJECTION INTRAMUSCULAR; INTRAVENOUS ONCE AS NEEDED
Status: DISCONTINUED | OUTPATIENT
Start: 2023-10-25 | End: 2023-10-25 | Stop reason: HOSPADM

## 2023-10-25 RX ORDER — FLUMAZENIL 0.1 MG/ML
0.2 INJECTION INTRAVENOUS AS NEEDED
Status: DISCONTINUED | OUTPATIENT
Start: 2023-10-25 | End: 2023-10-25 | Stop reason: HOSPADM

## 2023-10-25 RX ORDER — EPHEDRINE SULFATE 50 MG/ML
5 INJECTION, SOLUTION INTRAVENOUS ONCE AS NEEDED
Status: DISCONTINUED | OUTPATIENT
Start: 2023-10-25 | End: 2023-10-25 | Stop reason: HOSPADM

## 2023-10-25 RX ORDER — SCOLOPAMINE TRANSDERMAL SYSTEM 1 MG/1
1 PATCH, EXTENDED RELEASE TRANSDERMAL CONTINUOUS
Status: DISCONTINUED | OUTPATIENT
Start: 2023-10-25 | End: 2023-10-25 | Stop reason: HOSPADM

## 2023-10-25 RX ORDER — FENTANYL CITRATE 50 UG/ML
INJECTION, SOLUTION INTRAMUSCULAR; INTRAVENOUS AS NEEDED
Status: DISCONTINUED | OUTPATIENT
Start: 2023-10-25 | End: 2023-10-25 | Stop reason: SURG

## 2023-10-25 RX ORDER — MIDAZOLAM HYDROCHLORIDE 1 MG/ML
1 INJECTION INTRAMUSCULAR; INTRAVENOUS
Status: DISCONTINUED | OUTPATIENT
Start: 2023-10-25 | End: 2023-10-25 | Stop reason: HOSPADM

## 2023-10-25 RX ORDER — NALOXONE HCL 0.4 MG/ML
0.2 VIAL (ML) INJECTION AS NEEDED
Status: DISCONTINUED | OUTPATIENT
Start: 2023-10-25 | End: 2023-10-25 | Stop reason: HOSPADM

## 2023-10-25 RX ORDER — FENTANYL CITRATE 50 UG/ML
50 INJECTION, SOLUTION INTRAMUSCULAR; INTRAVENOUS ONCE AS NEEDED
Status: DISCONTINUED | OUTPATIENT
Start: 2023-10-25 | End: 2023-10-25 | Stop reason: HOSPADM

## 2023-10-25 RX ORDER — LIDOCAINE HYDROCHLORIDE 20 MG/ML
INJECTION, SOLUTION INFILTRATION; PERINEURAL AS NEEDED
Status: DISCONTINUED | OUTPATIENT
Start: 2023-10-25 | End: 2023-10-25 | Stop reason: SURG

## 2023-10-25 RX ORDER — PROMETHAZINE HYDROCHLORIDE 25 MG/1
25 SUPPOSITORY RECTAL ONCE AS NEEDED
Status: DISCONTINUED | OUTPATIENT
Start: 2023-10-25 | End: 2023-10-25 | Stop reason: HOSPADM

## 2023-10-25 RX ORDER — METHYLERGONOVINE MALEATE 0.2 MG/ML
INJECTION INTRAVENOUS AS NEEDED
Status: DISCONTINUED | OUTPATIENT
Start: 2023-10-25 | End: 2023-10-25 | Stop reason: SURG

## 2023-10-25 RX ORDER — HYDROCODONE BITARTRATE AND ACETAMINOPHEN 7.5; 325 MG/1; MG/1
1 TABLET ORAL EVERY 4 HOURS PRN
Status: DISCONTINUED | OUTPATIENT
Start: 2023-10-25 | End: 2023-10-25 | Stop reason: HOSPADM

## 2023-10-25 RX ORDER — DIPHENHYDRAMINE HYDROCHLORIDE 50 MG/ML
12.5 INJECTION INTRAMUSCULAR; INTRAVENOUS
Status: DISCONTINUED | OUTPATIENT
Start: 2023-10-25 | End: 2023-10-25 | Stop reason: HOSPADM

## 2023-10-25 RX ORDER — FENTANYL CITRATE 50 UG/ML
25 INJECTION, SOLUTION INTRAMUSCULAR; INTRAVENOUS
Status: DISCONTINUED | OUTPATIENT
Start: 2023-10-25 | End: 2023-10-25 | Stop reason: HOSPADM

## 2023-10-25 RX ORDER — DOXYCYCLINE 100 MG/1
100 CAPSULE ORAL ONCE
Status: COMPLETED | OUTPATIENT
Start: 2023-10-25 | End: 2023-10-25

## 2023-10-25 RX ORDER — ONDANSETRON 2 MG/ML
INJECTION INTRAMUSCULAR; INTRAVENOUS AS NEEDED
Status: DISCONTINUED | OUTPATIENT
Start: 2023-10-25 | End: 2023-10-25 | Stop reason: SURG

## 2023-10-25 RX ADMIN — SCOPALAMINE 1 PATCH: 1 PATCH, EXTENDED RELEASE TRANSDERMAL at 10:35

## 2023-10-25 RX ADMIN — METHYLERGONOVINE MALEATE 200 MCG: 0.2 INJECTION INTRAVENOUS at 12:09

## 2023-10-25 RX ADMIN — DEXAMETHASONE SODIUM PHOSPHATE 8 MG: 4 INJECTION, SOLUTION INTRAMUSCULAR; INTRAVENOUS at 11:58

## 2023-10-25 RX ADMIN — DOXYCYCLINE 100 MG: 100 CAPSULE ORAL at 10:35

## 2023-10-25 RX ADMIN — DEXMEDETOMIDINE HCL 10 MCG: 100 INJECTION INTRAVENOUS at 12:16

## 2023-10-25 RX ADMIN — ACETAMINOPHEN 1000 MG: 500 TABLET ORAL at 10:35

## 2023-10-25 RX ADMIN — FENTANYL CITRATE 50 MCG: 50 INJECTION, SOLUTION INTRAMUSCULAR; INTRAVENOUS at 11:59

## 2023-10-25 RX ADMIN — LIDOCAINE HYDROCHLORIDE 100 MG: 20 INJECTION, SOLUTION INFILTRATION; PERINEURAL at 11:51

## 2023-10-25 RX ADMIN — ONDANSETRON 4 MG: 2 INJECTION INTRAMUSCULAR; INTRAVENOUS at 12:11

## 2023-10-25 RX ADMIN — SODIUM CHLORIDE, POTASSIUM CHLORIDE, SODIUM LACTATE AND CALCIUM CHLORIDE 9 ML/HR: 600; 310; 30; 20 INJECTION, SOLUTION INTRAVENOUS at 10:57

## 2023-10-25 RX ADMIN — GABAPENTIN 600 MG: 300 CAPSULE ORAL at 10:35

## 2023-10-25 RX ADMIN — SODIUM CHLORIDE, POTASSIUM CHLORIDE, SODIUM LACTATE AND CALCIUM CHLORIDE 9 ML/HR: 600; 310; 30; 20 INJECTION, SOLUTION INTRAVENOUS at 10:37

## 2023-10-25 RX ADMIN — FAMOTIDINE 20 MG: 10 INJECTION INTRAVENOUS at 10:57

## 2023-10-25 RX ADMIN — PROPOFOL 200 MG: 10 INJECTION, EMULSION INTRAVENOUS at 11:51

## 2023-10-25 RX ADMIN — PROPOFOL 100 MG: 10 INJECTION, EMULSION INTRAVENOUS at 11:52

## 2023-10-25 NOTE — ANESTHESIA PROCEDURE NOTES
Airway  Urgency: elective    Date/Time: 10/25/2023 11:54 AM    General Information and Staff    Patient location during procedure: OR  Anesthesiologist: Frank Tam MD  CRNA/CAA: Mary Olivier CRNA    Indications and Patient Condition    Preoxygenated: yes  Mask difficulty assessment: 0 - not attempted    Final Airway Details  Final airway type: supraglottic airway      Successful airway: classic  Size 4     Number of attempts at approach: 1    Additional Comments  Pt to OR and positioned self to OR table. Monitors applied. PreO2: SIVI and easy insertion LMA. ETCO2 +, Equal and bilateral chest rise

## 2023-10-25 NOTE — ANESTHESIA POSTPROCEDURE EVALUATION
"Patient: Paulette Martinez    Procedure Summary       Date: 10/25/23 Room / Location: [facility]   [facility]    Anesthesia Start: 1141 Anesthesia Stop: 1228    Procedure: DILATATION AND CURETTAGE WITH SUCTION (Vagina) Diagnosis:       Retained products of conception following       (Retained products of conception following  [O03.4])    Surgeons: Cristy Metz MD Provider: Gi Dior MD    Anesthesia Type: general ASA Status: 1            Anesthesia Type: general    Vitals  Vitals Value Taken Time   /70 10/25/23 1300   Temp 36.7 °C (98 °F) 10/25/23 1300   Pulse 65 10/25/23 1300   Resp 18 10/25/23 1300   SpO2 100 % 10/25/23 1300           Post Anesthesia Care and Evaluation    Patient location during evaluation: PHASE II  Patient participation: complete - patient participated  Level of consciousness: awake and alert  Pain management: adequate    Airway patency: patent  Anesthetic complications: No anesthetic complications    Cardiovascular status: acceptable  Respiratory status: acceptable  Hydration status: acceptable    Comments: /66 (BP Location: Right arm, Patient Position: Lying)   Pulse 57   Temp 36.7 °C (98 °F) (Oral)   Resp 18   Ht 165.1 cm (65\")   LMP 10/05/2023 (Exact Date) Comment: miscarriage  SpO2 99%   BMI 24.63 kg/m²       "

## 2023-10-25 NOTE — DISCHARGE INSTRUCTIONS
You received Tylenol 1,000mg at 10:30am.    Scopolamine Patch  This patch has been applied to the skin behind one of your ears.  It may stay in place up to 24 hours. You may remove it at any time after your surgery; however, it should be removed after you are up and walking around the next day.  This medicine reduces stomach upset. Side effects may include: dry mouth, dizziness, sleepiness, constipation, or upset stomach.  An allergy would show up as: a rash, itching, wheezing or shortness of breath.  Follow these instructions:  Do not drink alcohol, drive or operate machinery while taking this medicine.  Wear only 1 patch at a time. You can leave the patch on for up to 24 hours.  When you remove the patch, fold it in half with the sticky sides together and throw it away. Wash your hands and the area under the patch.  Do not touch your eye with your hand if it has touched the patch.  Wash your hands well before and after touching the patch.  Sit or stand slowly to avoid dizziness.  Call your doctor if you have:  Any sign of allergy  No relief  Trouble passing urine  Any new or severe symptoms

## 2023-10-25 NOTE — H&P
[facility]   HISTORY AND PHYSICAL    Patient Name: Paulette Martinez  : 2000  MRN: [id number]  Primary Care Physician:  Provider, No Known  Date of admission: 10/25/2023    Subjective   Subjective     Chief Complaint: Retained products after termination of pregnancy    History of Present Illness  Paulette Martinez is a 23 y.o. female who presented to the office 2 days ago for tvus and follow up after medical management with cytotec for retained products of conception after elective termination. She reported bleeding started heavier Thursday evening and was the same throughout the weekend.  She reported the day before she noticed less bleeding today just having light brownish spotting.  Ultrasound still revealed complex endometrial with suspected retained products of conception measuring 3.5 x 1.5 cm.  Discussed options with patient and recommendation of suction D&C at this point.  Patient agrees with plan.  Discussed risk of perforation, infection, and heavy bleeding.  Patient expressed understanding and desires to proceed     Review of Systems   Constitutional:  Negative for chills, fatigue and fever.   Gastrointestinal:  Negative for abdominal distention and abdominal pain.   Genitourinary:  Negative for dysuria, pelvic pain, vaginal bleeding, vaginal discharge and vaginal pain.   All other systems reviewed and are negative.       Personal History     Past Medical History:   Diagnosis Date    Trichomoniasis 2020       History reviewed. No pertinent surgical history.    Family History: family history includes Bipolar disorder in her mother; Diabetes in her paternal grandmother; Hypertension in her paternal grandmother. Otherwise pertinent FHx was reviewed and not pertinent to current issue.    Social History:  reports that she has never smoked. She has never used smokeless tobacco. She reports that she does not drink alcohol and does not use drugs.    Home Medications:       Allergies:  No Known  Allergies    Objective    Objective     Vitals:   Temp:  [98.4 °F (36.9 °C)] 98.4 °F (36.9 °C)  Heart Rate:  [71] 71  Resp:  [18] 18  BP: (108)/(69) 108/69    Physical Exam  Vitals and nursing note reviewed.   Constitutional:       Appearance: Normal appearance. She is normal weight.   Pulmonary:      Effort: Pulmonary effort is normal.   Neurological:      Mental Status: She is alert and oriented to person, place, and time.   Psychiatric:         Mood and Affect: Mood normal.         Behavior: Behavior normal.       Results from last 7 days   Lab Units 10/25/23  0948   WBC 10*3/mm3 6.81   HEMOGLOBIN g/dL 11.8*   HEMATOCRIT % 35.9   PLATELETS 10*3/mm3 274       Result Review    Result Review:  I have personally reviewed the results from the time of this admission to 10/25/2023 11:34 EDT and agree with these findings:  []  Laboratory list / accordion  []  Microbiology  [x]  Radiology  []  EKG/Telemetry   []  Cardiology/Vascular   []  Pathology  []  Old records  []  Other:  Most notable findings include: Retained products after termination of pregnancy      Assessment & Plan   Assessment / Plan     Brief Patient Summary:  Paulette Martinez is a 23 y.o. female who presents for suction D&C.    Active Hospital Problems:  Active Hospital Problems    Diagnosis     **Retained products of conception following       Plan: Proceed to the OR      DVT prophylaxis:  Mechanical DVT prophylaxis orders are present.    CODE STATUS:       Admission Status:  I believe this patient meets outpatient status.    Cristy Metz MD

## 2023-10-25 NOTE — OP NOTE
[facility]   Obstetrics and Gynecology   Operative Note    Date of Procedure: 10/25/2023    Patient:  Paulette Martinez   MR#: [id number]    PREOPERATIVE DIAGNOSIS:   Retained products of conception following  [O03.4]    Post-Op Diagnosis Codes:     * Retained products of conception following  [O03.4]      PROCEDURES PERFORMED:    1.  Suction dilation and curettage    SURGEON: Cristy Metz MD    ASSISTANT: None    ANESTHESIA: LMA    ESTIMATED BLOOD LOSS: 20 mL.      SPECIMENS:   Order Name Source Comment Collection Info Order Time   TYPE AND SCREEN   Collected By: Kari Puga RN 10/25/2023  9:29 AM     Release to patient   Routine Release        TISSUE PATHOLOGY EXAM Products of Conception  Collected By: Cristy Metz MD 10/25/2023 12:05 PM     Release to patient   Routine Release            COMPLICATIONS: None.      INDICATIONS: See the written history and physical.      DESCRIPTION OF PROCEDURE:   The patient is taken to the operating room and placed in supine position.  General anesthesia is administered and the surgical timeout for  procedure is performed.    The patient is prepped and draped in the usual sterile fashion with her legs positioned in candycane stirrups.  Weighted speculum is inserted into the vagina and the cervix is grasped anteriorly with a single-tooth tenaculum.  Sequential dilators were used to dilate the cervix to a maximum of 18 German.  A 7 mm cannula is inserted into the uterine cavity and in a rotating fashion is used to evacuate retained products of conception.  The procedure is repeated approximately 3 times then a single pass is made with the sharp curette followed by final pass with the suction curet.  Findings were consistent with products of conception and scant bleeding is noted after the procedure.    The patient's awakened and taken recovery room in stable condition to patient's blood type is A Negative and RhoGAM was not  given.      Cristy Metz MD  10/25/2023  12:13 EDT

## 2023-10-25 NOTE — ANESTHESIA PREPROCEDURE EVALUATION
Anesthesia Evaluation     no history of anesthetic complications:                Airway   Mallampati: I  TM distance: >3 FB  Neck ROM: full  Dental - normal exam     Pulmonary    (-) shortness of breath, recent URI, not a smoker  Cardiovascular     (-) valvular problems/murmurs, dysrhythmias      Neuro/Psych  GI/Hepatic/Renal/Endo    (-) no renal disease, diabetes    Musculoskeletal     Abdominal    Substance History      OB/GYN          Other                    Anesthesia Plan    ASA 1     general     intravenous induction     Anesthetic plan, risks, benefits, and alternatives have been provided, discussed and informed consent has been obtained with: patient.    CODE STATUS:

## 2023-10-26 LAB
LAB AP CASE REPORT: NORMAL
LAB AP DIAGNOSIS COMMENT: NORMAL
PATH REPORT.FINAL DX SPEC: NORMAL
PATH REPORT.GROSS SPEC: NORMAL

## 2023-11-06 ENCOUNTER — OFFICE VISIT (OUTPATIENT)
Dept: OBSTETRICS AND GYNECOLOGY | Age: 23
End: 2023-11-06
Payer: COMMERCIAL

## 2023-11-06 VITALS
HEIGHT: 65 IN | BODY MASS INDEX: 24.32 KG/M2 | DIASTOLIC BLOOD PRESSURE: 64 MMHG | WEIGHT: 146 LBS | SYSTOLIC BLOOD PRESSURE: 100 MMHG

## 2023-11-06 DIAGNOSIS — Z98.890 S/P D&C (STATUS POST DILATION AND CURETTAGE): Primary | ICD-10-CM

## 2023-11-06 DIAGNOSIS — Z30.011 ENCOUNTER FOR INITIAL PRESCRIPTION OF CONTRACEPTIVE PILLS: ICD-10-CM

## 2023-11-06 DIAGNOSIS — Z13.9 SPECIAL SCREENING: ICD-10-CM

## 2023-11-06 PROBLEM — O03.4 RETAINED PRODUCTS OF CONCEPTION FOLLOWING ABORTION: Status: RESOLVED | Noted: 2023-10-18 | Resolved: 2023-11-06

## 2023-11-06 RX ORDER — NORETHINDRONE ACETATE AND ETHINYL ESTRADIOL 1MG-20(21)
1 KIT ORAL DAILY
Qty: 84 TABLET | Refills: 3 | Status: SHIPPED | OUTPATIENT
Start: 2023-11-06 | End: 2024-11-05

## 2023-11-06 RX ORDER — ACETAMINOPHEN AND CODEINE PHOSPHATE 120; 12 MG/5ML; MG/5ML
SOLUTION ORAL
COMMUNITY
Start: 2023-10-31 | End: 2023-11-06

## 2023-11-06 NOTE — PROGRESS NOTES
"Jody Martinez is a 23 y.o. female presents 1 WK 5 DAYS post op from D&C 10/25/23 for retained products of conception after an elective termination in [place].  Patient reports on and off bleeding but no heavy bleeding. Reports vaginal area irritated. Urine hcg test negative.  Patient would like to continue birth control pills.  We will switch to a combined hormonal pill, since it is more forgiving.     History of Present Illness    The following portions of the patient's history were reviewed and updated as appropriate: allergies, current medications, past family history, past medical history, past social history, past surgical history, and problem list.    Review of Systems   Constitutional:  Negative for chills, fatigue and fever.   Gastrointestinal:  Negative for abdominal distention and abdominal pain.   Genitourinary:  Negative for dyspareunia, dysuria, menstrual problem, pelvic pain, vaginal bleeding, vaginal discharge and vaginal pain.   All other systems reviewed and are negative.    /64   Ht 165.1 cm (65\")   Wt 66.2 kg (146 lb)   LMP 10/05/2023 (Exact Date) Comment: miscarriage  BMI 24.30 kg/m²     Objective   Physical Exam  Vitals and nursing note reviewed.   Constitutional:       Appearance: Normal appearance. She is normal weight.   Pulmonary:      Effort: Pulmonary effort is normal.   Neurological:      Mental Status: She is alert and oriented to person, place, and time.   Psychiatric:         Mood and Affect: Mood normal.         Behavior: Behavior normal.         Assessment & Plan   Diagnoses and all orders for this visit:    1. S/P D&C (status post dilation and curettage) (Primary)    2. Special screening  -     POC Pregnancy, Urine    3. Encounter for initial prescription of contraceptive pills  -     norethindrone-ethinyl estradiol FE (Junel FE 1/20) 1-20 MG-MCG per tablet; Take 1 tablet by mouth Daily.  Dispense: 84 tablet; Refill: 3       Counseling was given to " patient for the following topics: instructions for management, impressions, risks and benefits of treatment options, and importance of treatment compliance . Total time of the encounter was 20 minutes and 15 minutes was spent counseling.  Return in about 6 months (around 5/6/2024) for Annual physical - next availabale  .

## 2024-01-09 ENCOUNTER — OFFICE VISIT (OUTPATIENT)
Dept: INTERNAL MEDICINE | Facility: CLINIC | Age: 24
End: 2024-01-09
Payer: COMMERCIAL

## 2024-01-09 VITALS
HEART RATE: 81 BPM | SYSTOLIC BLOOD PRESSURE: 120 MMHG | WEIGHT: 147 LBS | BODY MASS INDEX: 24.49 KG/M2 | OXYGEN SATURATION: 100 % | HEIGHT: 65 IN | DIASTOLIC BLOOD PRESSURE: 82 MMHG

## 2024-01-09 DIAGNOSIS — M79.631 RIGHT FOREARM PAIN: ICD-10-CM

## 2024-01-09 DIAGNOSIS — L98.9 SKIN LESION: Primary | ICD-10-CM

## 2024-01-09 DIAGNOSIS — M54.6 MIDLINE THORACIC BACK PAIN, UNSPECIFIED CHRONICITY: ICD-10-CM

## 2024-01-09 DIAGNOSIS — Z00.00 ENCOUNTER FOR MEDICAL EXAMINATION TO ESTABLISH CARE: ICD-10-CM

## 2024-01-09 NOTE — PATIENT INSTRUCTIONS
Recommend Advil or Aleve for wrist pain. Recommend wrist brace daily and off at night. Ice wrist at end of day. Recommend topical patch to back daily for pain. Heating pad to back and stretch daily. Stay hydrated with water. Referral placed for dermatology. Scheduling center to call with appointment. Follow-up in 3 months for annual and fasting labs.

## 2024-01-09 NOTE — PROGRESS NOTES
"Chief Complaint  Establish Care, Leg Injury, and Wrist Pain (R side wrist pain )    Subjective        Paulette Martinez presents to [facility]  History of Present Illness  23-year-old female presenting with right leg lesion, right wrist pain and to establish care.  1-year-old son, Fany, is doing well.  Patient is not breast-feeding.  Has had a regular cordlike lesion for the past year.  Has some slight pain associated with it.  Has not grown in the past year.  Denies drainage or bleeding.    Right wrist and forearm has tenderness.  This happens especially at work.  No symptoms started during carrying her son and breast-feeding.  Denies numbness or tingling in the fingers.  Has occasional swelling of the forearm.    Has been having pain in mid back believed to be near site of epidural.  Mid back pain then radiates down left leg to about mid thigh.  Pain occurs randomly throughout the day.  Leg Injury    Wrist Pain         Objective   Vital Signs:  /82 (BP Location: Right arm, Patient Position: Sitting, Cuff Size: Adult)   Pulse 81   Ht 165.1 cm (65\")   Wt 66.7 kg (147 lb)   SpO2 100%   BMI 24.46 kg/m²   Estimated body mass index is 24.46 kg/m² as calculated from the following:    Height as of this encounter: 165.1 cm (65\").    Weight as of this encounter: 66.7 kg (147 lb).       BMI is within normal parameters. No other follow-up for BMI required.      Physical Exam  Vitals reviewed.   Constitutional:       Appearance: Normal appearance.   HENT:      Head: Normocephalic.   Musculoskeletal:         General: Normal range of motion.        Arms:       Cervical back: Normal range of motion.        Back:         Legs:    Skin:     General: Skin is warm and dry.      Capillary Refill: Capillary refill takes less than 2 seconds.   Neurological:      General: No focal deficit present.      Mental Status: She is alert and oriented to person, place, and time.   Psychiatric:         " Mood and Affect: Mood normal.         Behavior: Behavior normal.         Thought Content: Thought content normal.         Judgment: Judgment normal.        Result Review :    Common labs          9/6/2023    19:31 10/25/2023    09:48   Common Labs   WBC 10.29     6.81    Hemoglobin 13.4     11.8    Hematocrit 38.7     35.9    Platelets 297     274       Details          This result is from an external source.                 Current Outpatient Medications on File Prior to Visit   Medication Sig Dispense Refill    norethindrone-ethinyl estradiol FE (Junel FE 1/20) 1-20 MG-MCG per tablet Take 1 tablet by mouth Daily. 84 tablet 3     No current facility-administered medications on file prior to visit.              Assessment and Plan   Diagnoses and all orders for this visit:    1. Skin lesion (Primary)  -     Ambulatory Referral to Dermatology    2. Encounter for medical examination to establish care    3. Midline thoracic back pain, unspecified chronicity    4. Right forearm pain      Patient Instructions   Recommend Advil or Aleve for wrist pain. Recommend wrist brace daily and off at night. Ice wrist at end of day. Recommend topical patch to back daily for pain. Heating pad to back and stretch daily. Stay hydrated with water. Referral placed for dermatology. Scheduling center to call with appointment. Follow-up in 3 months for annual and fasting labs.          Follow Up   Return in about 3 months (around 4/9/2024) for Annual physical.  Patient was given instructions and counseling regarding her condition or for health maintenance advice. Please see specific information pulled into the AVS if appropriate.

## 2024-01-29 ENCOUNTER — TELEPHONE (OUTPATIENT)
Dept: INTERNAL MEDICINE | Facility: CLINIC | Age: 24
End: 2024-01-29
Payer: COMMERCIAL

## 2024-01-29 NOTE — TELEPHONE ENCOUNTER
Caller: Paulette Martinez    Relationship: Self    Best call back number:[phone]     What is the best time to reach you: ANYTIME    Who are you requesting to speak with (clinical staff, provider,  specific staff member): CLINICAL    What was the call regarding: PATIENT CALLING WANTING TO KNOW IF SHE COULD BE TESTED FOR MOLD EXPOSURE SHE STATED THAT SHE HAS BEEN SICK MORE THAN USUAL AND THE APARTMENT THAT SHE IS CURRENTLY LIVING IN IS LEAKING SHE WAS ABLE TO SEE GREEN MOLD ON THE CEILING     Is it okay if the provider responds through MyChart: YES

## 2024-02-13 DIAGNOSIS — Z30.011 ENCOUNTER FOR INITIAL PRESCRIPTION OF CONTRACEPTIVE PILLS: ICD-10-CM

## 2024-02-15 RX ORDER — ACETAMINOPHEN AND CODEINE PHOSPHATE 120; 12 MG/5ML; MG/5ML
1 SOLUTION ORAL DAILY
Qty: 84 TABLET | Refills: 0 | Status: SHIPPED | OUTPATIENT
Start: 2024-02-15 | End: 2025-02-14

## 2024-03-01 DIAGNOSIS — Z30.011 ENCOUNTER FOR INITIAL PRESCRIPTION OF CONTRACEPTIVE PILLS: ICD-10-CM

## 2024-03-01 RX ORDER — ACETAMINOPHEN AND CODEINE PHOSPHATE 120; 12 MG/5ML; MG/5ML
1 SOLUTION ORAL DAILY
Qty: 84 TABLET | Refills: 0 | OUTPATIENT
Start: 2024-03-01 | End: 2025-03-01

## 2024-03-01 NOTE — TELEPHONE ENCOUNTER
Looks like patient is on regular OCP's. Can you confirm which birth control pill patient is taking?

## 2024-03-25 ENCOUNTER — OFFICE VISIT (OUTPATIENT)
Dept: OBSTETRICS AND GYNECOLOGY | Age: 24
End: 2024-03-25
Payer: COMMERCIAL

## 2024-03-25 VITALS
DIASTOLIC BLOOD PRESSURE: 72 MMHG | BODY MASS INDEX: 26.75 KG/M2 | WEIGHT: 151 LBS | HEIGHT: 63 IN | SYSTOLIC BLOOD PRESSURE: 120 MMHG

## 2024-03-25 DIAGNOSIS — Z01.419 ENCOUNTER FOR GYNECOLOGICAL EXAMINATION WITHOUT ABNORMAL FINDING: Primary | ICD-10-CM

## 2024-03-25 DIAGNOSIS — Z12.4 SCREENING FOR MALIGNANT NEOPLASM OF THE CERVIX: ICD-10-CM

## 2024-03-25 PROCEDURE — 1160F RVW MEDS BY RX/DR IN RCRD: CPT | Performed by: OBSTETRICS & GYNECOLOGY

## 2024-03-25 PROCEDURE — 2014F MENTAL STATUS ASSESS: CPT | Performed by: OBSTETRICS & GYNECOLOGY

## 2024-03-25 PROCEDURE — 99459 PELVIC EXAMINATION: CPT | Performed by: OBSTETRICS & GYNECOLOGY

## 2024-03-25 PROCEDURE — 1159F MED LIST DOCD IN RCRD: CPT | Performed by: OBSTETRICS & GYNECOLOGY

## 2024-03-25 PROCEDURE — 99395 PREV VISIT EST AGE 18-39: CPT | Performed by: OBSTETRICS & GYNECOLOGY

## 2024-03-25 NOTE — PROGRESS NOTES
"Jody Martinez is a 23 y.o. female presents for annual visit today , no pap on file , contraception bcp periods are normal duration 7 days   No problems today.  Patient has been under a lot of stress recently due to her brother being incarcerated and a lot of family drama.  Luis is doing well and growing.  Patient reports doing well on the birth control pills.  History of Present Illness    The following portions of the patient's history were reviewed and updated as appropriate: allergies, current medications, past family history, past medical history, past social history, past surgical history, and problem list.    Review of Systems   Constitutional:  Negative for chills, fatigue and fever.   Gastrointestinal:  Negative for abdominal distention and abdominal pain.   Genitourinary:  Negative for dyspareunia, dysuria, menstrual problem, pelvic pain, vaginal bleeding, vaginal discharge and vaginal pain.   All other systems reviewed and are negative.  /72   Ht 160 cm (63\")   Wt 68.5 kg (151 lb)   LMP 03/21/2024 (Approximate)   BMI 26.75 kg/m²       Objective   Physical Exam  Vitals and nursing note reviewed.   Constitutional:       Appearance: Normal appearance. She is well-developed and normal weight.   Neck:      Thyroid: No thyromegaly.   Pulmonary:      Effort: Pulmonary effort is normal.   Chest:   Breasts:     Right: No mass, nipple discharge, skin change or tenderness.      Left: No mass, nipple discharge, skin change or tenderness.   Abdominal:      General: There is no distension.      Palpations: Abdomen is soft.      Tenderness: There is no abdominal tenderness.   Genitourinary:     General: Normal vulva.      Exam position: Lithotomy position.      Labia:         Right: No rash or lesion.         Left: No rash or lesion.       Vagina: Normal. No vaginal discharge or bleeding.      Cervix: No friability or lesion.      Uterus: Not enlarged and not tender.       Adnexa:         " Right: No mass or tenderness.          Left: No mass or tenderness.     Musculoskeletal:         General: Normal range of motion.      Cervical back: Normal range of motion.   Skin:     General: Skin is warm and dry.      Findings: No rash.   Neurological:      Mental Status: She is alert and oriented to person, place, and time.   Psychiatric:         Mood and Affect: Mood normal.         Behavior: Behavior normal.           Assessment & Plan   Diagnoses and all orders for this visit:    1. Encounter for gynecological examination without abnormal finding (Primary)    2. Screening for malignant neoplasm of the cervix  -     IGP, Rfx Aptima HPV ASCU      Counseling was given to patient for the following topics: instructions for management, impressions, importance of treatment compliance, and breast exams  .   Return in about 1 year (around 3/25/2025) for Annual physical.

## 2024-03-29 LAB
CONV .: NORMAL
CYTOLOGIST CVX/VAG CYTO: NORMAL
CYTOLOGY CVX/VAG DOC CYTO: NORMAL
CYTOLOGY CVX/VAG DOC THIN PREP: NORMAL
DX ICD CODE: NORMAL
Lab: NORMAL
OTHER STN SPEC: NORMAL
STAT OF ADQ CVX/VAG CYTO-IMP: NORMAL

## 2024-04-09 ENCOUNTER — OFFICE VISIT (OUTPATIENT)
Dept: INTERNAL MEDICINE | Facility: CLINIC | Age: 24
End: 2024-04-09
Payer: COMMERCIAL

## 2024-04-09 VITALS
TEMPERATURE: 98 F | OXYGEN SATURATION: 99 % | HEIGHT: 63 IN | SYSTOLIC BLOOD PRESSURE: 104 MMHG | BODY MASS INDEX: 26.72 KG/M2 | WEIGHT: 150.8 LBS | HEART RATE: 97 BPM | DIASTOLIC BLOOD PRESSURE: 74 MMHG

## 2024-04-09 DIAGNOSIS — Z81.8 FAMILY HISTORY OF BIPOLAR DISORDER: ICD-10-CM

## 2024-04-09 DIAGNOSIS — F41.9 ANXIETY: ICD-10-CM

## 2024-04-09 DIAGNOSIS — Z00.00 ANNUAL PHYSICAL EXAM: Primary | ICD-10-CM

## 2024-04-09 LAB
ALBUMIN SERPL-MCNC: 4.6 G/DL (ref 3.5–5.2)
ALBUMIN/GLOB SERPL: 1.8 G/DL
ALP SERPL-CCNC: 64 U/L (ref 39–117)
ALT SERPL-CCNC: 13 U/L (ref 1–33)
APPEARANCE UR: CLEAR
AST SERPL-CCNC: 13 U/L (ref 1–32)
BACTERIA #/AREA URNS HPF: ABNORMAL /HPF
BASOPHILS # BLD AUTO: 0.04 10*3/MM3 (ref 0–0.2)
BASOPHILS NFR BLD AUTO: 0.6 % (ref 0–1.5)
BILIRUB SERPL-MCNC: 0.4 MG/DL (ref 0–1.2)
BILIRUB UR QL STRIP: NEGATIVE
BUN SERPL-MCNC: 9 MG/DL (ref 6–20)
BUN/CREAT SERPL: 11.8 (ref 7–25)
CALCIUM SERPL-MCNC: 9.5 MG/DL (ref 8.6–10.5)
CASTS URNS MICRO: ABNORMAL
CHLORIDE SERPL-SCNC: 104 MMOL/L (ref 98–107)
CHOLEST SERPL-MCNC: 164 MG/DL (ref 0–200)
CHOLEST/HDLC SERPL: 3.04 {RATIO}
CO2 SERPL-SCNC: 25.3 MMOL/L (ref 22–29)
COLOR UR: YELLOW
CREAT SERPL-MCNC: 0.76 MG/DL (ref 0.57–1)
EGFRCR SERPLBLD CKD-EPI 2021: 114.5 ML/MIN/1.73
EOSINOPHIL # BLD AUTO: 0.13 10*3/MM3 (ref 0–0.4)
EOSINOPHIL NFR BLD AUTO: 1.9 % (ref 0.3–6.2)
EPI CELLS #/AREA URNS HPF: ABNORMAL /HPF
ERYTHROCYTE [DISTWIDTH] IN BLOOD BY AUTOMATED COUNT: 14.9 % (ref 12.3–15.4)
GLOBULIN SER CALC-MCNC: 2.6 GM/DL
GLUCOSE SERPL-MCNC: 91 MG/DL (ref 65–99)
GLUCOSE UR QL STRIP: NEGATIVE
HCT VFR BLD AUTO: 38.4 % (ref 34–46.6)
HDLC SERPL-MCNC: 54 MG/DL (ref 40–60)
HGB BLD-MCNC: 12.4 G/DL (ref 12–15.9)
HGB UR QL STRIP: ABNORMAL
IMM GRANULOCYTES # BLD AUTO: 0.02 10*3/MM3 (ref 0–0.05)
IMM GRANULOCYTES NFR BLD AUTO: 0.3 % (ref 0–0.5)
KETONES UR QL STRIP: NEGATIVE
LDLC SERPL CALC-MCNC: 90 MG/DL (ref 0–100)
LEUKOCYTE ESTERASE UR QL STRIP: NEGATIVE
LYMPHOCYTES # BLD AUTO: 1.88 10*3/MM3 (ref 0.7–3.1)
LYMPHOCYTES NFR BLD AUTO: 26.8 % (ref 19.6–45.3)
MCH RBC QN AUTO: 27.3 PG (ref 26.6–33)
MCHC RBC AUTO-ENTMCNC: 32.3 G/DL (ref 31.5–35.7)
MCV RBC AUTO: 84.6 FL (ref 79–97)
MONOCYTES # BLD AUTO: 0.5 10*3/MM3 (ref 0.1–0.9)
MONOCYTES NFR BLD AUTO: 7.1 % (ref 5–12)
NEUTROPHILS # BLD AUTO: 4.45 10*3/MM3 (ref 1.7–7)
NEUTROPHILS NFR BLD AUTO: 63.3 % (ref 42.7–76)
NITRITE UR QL STRIP: NEGATIVE
NRBC BLD AUTO-RTO: 0 /100 WBC (ref 0–0.2)
PH UR STRIP: 6.5 [PH] (ref 5–8)
PLATELET # BLD AUTO: 252 10*3/MM3 (ref 140–450)
POTASSIUM SERPL-SCNC: 4.8 MMOL/L (ref 3.5–5.2)
PROT SERPL-MCNC: 7.2 G/DL (ref 6–8.5)
PROT UR QL STRIP: NEGATIVE
RBC # BLD AUTO: 4.54 10*6/MM3 (ref 3.77–5.28)
RBC #/AREA URNS HPF: ABNORMAL /HPF
SODIUM SERPL-SCNC: 139 MMOL/L (ref 136–145)
SP GR UR STRIP: 1.02 (ref 1–1.03)
TRIGL SERPL-MCNC: 110 MG/DL (ref 0–150)
TSH SERPL DL<=0.005 MIU/L-ACNC: 1.36 UIU/ML (ref 0.27–4.2)
UROBILINOGEN UR STRIP-MCNC: ABNORMAL MG/DL
VLDLC SERPL CALC-MCNC: 20 MG/DL (ref 5–40)
WBC # BLD AUTO: 7.02 10*3/MM3 (ref 3.4–10.8)
WBC #/AREA URNS HPF: ABNORMAL /HPF

## 2024-04-09 PROCEDURE — 2014F MENTAL STATUS ASSESS: CPT

## 2024-04-09 PROCEDURE — 1159F MED LIST DOCD IN RCRD: CPT

## 2024-04-09 PROCEDURE — 99395 PREV VISIT EST AGE 18-39: CPT

## 2024-04-09 PROCEDURE — 1160F RVW MEDS BY RX/DR IN RCRD: CPT

## 2024-04-09 NOTE — PROGRESS NOTES
"Chief Complaint  Annual Exam    Subjective        Paulette Martinez presents to [facility]  History of Present Illness  23-year-old female presenting for annual exam.  17-month-old son is doing well.  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  Sees Dr. Metz for OB/GYN.  Referral for dermatology was placed at last visit has not been contacted.  Skin lesion of right lower extremity has not been bothering her and has not changed.    Gets frequent headaches about 3 or 4 times a week.  This often happens at work after lunch.  Headache goes away after a hot shower and ibuprofen.  Headache as above left eye and radiates to the back of her head.    Scheduled to see dentist in June.  Had a previously failed root canal that forced whenever lower molars to be removed previously.    Has occasional sore throats that come and go on their own.  Has not been treated.  Tonsils appear large on examination.  Will consider longer regimen of antibiotics to treat if needed in the future.    States she has a significant family history of schizophrenia and bipolar disorder on her maternal side.  Patient was previously on medication for bipolar disorder along HD as a child.  Has not been taking for years.  States she has been noticing more anxiety and irritability.  Has been set off by the smallest thing as if \"hits a switch\" and finds it difficult to work, talk with coworkers or room patients.  It is quickly irritated on siblings and son as well.  She would like to consider testing and possible treatment in the future.      Objective   Vital Signs:  /74 (BP Location: Right arm, Patient Position: Sitting, Cuff Size: Adult)   Pulse 97   Temp 98 °F (36.7 °C)   Ht 160 cm (63\")   Wt 68.4 kg (150 lb 12.8 oz)   SpO2 99%   BMI 26.71 kg/m²   Estimated body mass index is 26.71 kg/m² as calculated from the following:    Height as of this encounter: 160 cm (63\").    Weight as of this encounter: " 68.4 kg (150 lb 12.8 oz).             Physical Exam  Vitals reviewed.   Constitutional:       Appearance: Normal appearance.   HENT:      Head: Normocephalic.      Right Ear: Tympanic membrane normal.      Left Ear: Tympanic membrane normal.      Nose: Nose normal.      Mouth/Throat:      Mouth: Mucous membranes are moist.      Pharynx: Oropharynx is clear.      Tonsils: No tonsillar exudate. 2+ on the right. 2+ on the left.   Eyes:      Pupils: Pupils are equal, round, and reactive to light.   Cardiovascular:      Rate and Rhythm: Normal rate.      Pulses: Normal pulses.      Heart sounds: Normal heart sounds.   Pulmonary:      Effort: Pulmonary effort is normal.      Breath sounds: Normal breath sounds.   Abdominal:      General: Bowel sounds are normal.      Palpations: Abdomen is soft.   Musculoskeletal:         General: Normal range of motion.      Cervical back: Normal range of motion.        Legs:    Skin:     General: Skin is warm and dry.      Capillary Refill: Capillary refill takes less than 2 seconds.   Neurological:      General: No focal deficit present.      Mental Status: She is alert and oriented to person, place, and time.   Psychiatric:         Mood and Affect: Mood normal.         Behavior: Behavior normal.         Thought Content: Thought content normal.         Judgment: Judgment normal.        Result Review :      Common labs          9/6/2023    19:31 10/25/2023    09:48   Common Labs   WBC 10.29     6.81    Hemoglobin 13.4     11.8    Hematocrit 38.7     35.9    Platelets 297     274       Details          This result is from an external source.                 Current Outpatient Medications on File Prior to Visit   Medication Sig Dispense Refill    norethindrone-ethinyl estradiol FE (Junel FE 1/20) 1-20 MG-MCG per tablet Take 1 tablet by mouth Daily. 84 tablet 3     No current facility-administered medications on file prior to visit.                Assessment and Plan     Diagnoses and all  orders for this visit:    1. Annual physical exam (Primary)  -     Comprehensive Metabolic Panel  -     Urinalysis With Microscopic If Indicated (No Culture) - Urine, Clean Catch  -     Lipid Panel With / Chol / HDL Ratio  -     TSH Rfx On Abnormal To Free T4  -     CBC & Differential    2. Anxiety  -     Ambulatory Referral to Behavioral Health    3. Family history of bipolar disorder  -     Ambulatory Referral to Behavioral Health        Patient Instructions   Continue medications as prescribed. Stay active. Stay hydrated with water.  Referral to behavioral health placed. Scheduling center to call with appointment. Labs today. Follow-up in a year for annual and fasting labs.     The patient was counseled regarding nutrition, physical activity, healthy weight, injury prevention, misuse of tobacco, alcohol and illicit drugs, mental health, immunizations, and screenings.         Follow Up     Return in about 1 year (around 4/9/2025) for Annual physical.  Patient was given instructions and counseling regarding her condition or for health maintenance advice. Please see specific information pulled into the AVS if appropriate.

## 2024-04-09 NOTE — PATIENT INSTRUCTIONS
Continue medications as prescribed. Stay active. Stay hydrated with water.  Referral to behavioral health placed. Scheduling center to call with appointment. Labs today. Follow-up in a year for annual and fasting labs.

## 2024-04-10 NOTE — PROGRESS NOTES
Cholesterol looks good.  No signs of thyroid disease, anemia, kidney injury, liver injury, diabetes or electrolyte imbalance.    Urinalysis shows some blood present in urine.  No bacteria present in urine.  Stay hydrated with water to help maintain overall kidney health.

## 2024-05-01 ENCOUNTER — TELEMEDICINE (OUTPATIENT)
Dept: PSYCHIATRY | Facility: CLINIC | Age: 24
End: 2024-05-01
Payer: COMMERCIAL

## 2024-05-01 DIAGNOSIS — F41.1 GENERALIZED ANXIETY DISORDER: Chronic | ICD-10-CM

## 2024-05-01 DIAGNOSIS — F39 UNSPECIFIED MOOD (AFFECTIVE) DISORDER: Primary | Chronic | ICD-10-CM

## 2024-05-01 PROCEDURE — 1159F MED LIST DOCD IN RCRD: CPT | Performed by: NURSE PRACTITIONER

## 2024-05-01 PROCEDURE — 1160F RVW MEDS BY RX/DR IN RCRD: CPT | Performed by: NURSE PRACTITIONER

## 2024-05-01 PROCEDURE — 90792 PSYCH DIAG EVAL W/MED SRVCS: CPT | Performed by: NURSE PRACTITIONER

## 2024-05-01 RX ORDER — LAMOTRIGINE 25 MG/1
TABLET ORAL
Qty: 46 TABLET | Refills: 0 | Status: SHIPPED | OUTPATIENT
Start: 2024-05-01

## 2024-05-01 NOTE — TREATMENT PLAN
Multi-Disciplinary Problems (from Behavioral Health Treatment Plan)      Active Problems       Problem: Anxiety  Start Date: 05/01/24      Problem Details: The patient self-scales this problem as a 10 with 10 being the worst.          Goal Priority Start Date Expected End Date End Date    Patient will develop and implement behavioral and cognitive strategies to reduce anxiety and irrational fears. -- 05/01/24 -- --    Goal Details: Progress toward goal:  Not appropriate to rate progress toward goal since this is the initial treatment plan.          Goal Intervention Frequency Start Date End Date    Help patient explore past emotional issues in relation to present anxiety. Q Month 05/01/24 --    Intervention Details: Duration of treatment until until discharged.          Goal Intervention Frequency Start Date End Date    Help patient develop an awareness of their cognitive and physical responses to anxiety. Q Month 05/01/24 --    Intervention Details: Duration of treatment until until discharged.                  Problem: Mood Instability  Start Date: 05/01/24      Problem Details: The patient self-scales this problem as a 8 with 10 being the worst.          Goal Priority Start Date Expected End Date End Date    Patient will achieve mood stability as evidenced by controlled behavior and a more deliberate thought process -- 05/01/24 -- --    Goal Details: Progress toward goal:  Not appropriate to rate progress toward goal since this is the initial treatment plan.          Goal Intervention Frequency Start Date End Date    Provide structure and focus to patient's thoughts and actions by establishing plans and routine. Q Month 05/01/24 --    Intervention Details: Duration of treatment until until discharged.          Goal Intervention Frequency Start Date End Date    Assist patient in setting responsible goals and limits in behavior. Q Month 05/01/24 --    Intervention Details: Duration of treatment until until  discharged.                                 I have discussed and reviewed this treatment plan with the patient and/or guardian.  The patient has verbally agreed with this treatment plan (no signatures are obtained at today's visit as the patient is a telehealth patient and is unable to print and sign this document, therefore verbal agreement is obtained).

## 2024-05-01 NOTE — PROGRESS NOTES
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 contact this APRN/this office or go to the emergency department immediately.  The patient verbalizes understanding and agreement with treatment plan in their own words.    Medication and treatment options, both pharmacological and non-pharmacological treatment options, discussed during today's visit, including any off label use of medication. Patient acknowledged and verbally consented with current treatment plan and was educated on the importance of compliance with treatment and follow-up appointments.       MEDICATION ISSUES:    Discussed treatment plan and medication options of prescribed medication as well as the risks, benefits, any black box warnings, and side effects including potential falls, possible impaired driving, and metabolic adversities among others, including any off label use of medication. Patient is agreeable to call the office with any worsening of symptoms or onset of side effects, or if any concerns or questions arise.  The contact information for the office is made available to the patient. They may call the [facility] at [phone]. Patient is agreeable to call 911 or go to the nearest ER should they begin having any SI/HI, or if any urgent concerns arise.        SUICIDE RISK ASSESSMENT: Unalterable demographics and a history of mental health  intervention indicate this patient is in a high risk category compared to the general population. At present, the patient denies active SI/HI, intentions, or plans at this time and agrees to seek immediate care should such thoughts develop. The patient verbalizes understanding of how to access emergency care if needed and agrees to do so. Consideration of suicide risk and protective factors such as history, current presentation, individual strengths and weaknesses, psychosocial and environmental stressors and variables, psychiatric illness and symptoms, medical conditions and pain, took place in this interview. Based on those considerations, the patient is determined: within individual baseline and presenting no imminent risk for suicide or homicide. Other recommendations: The patient does not meet the criteria for inpatient admission and is not a safety risk to self or others at today's visit. Inpatient treatment offers no significant advantages over outpatient treatment for this patient at today's visit.      SAFETY PLAN:  Patient was given ample time for questions and fully participated in treatment planning.  Patient was encouraged to call the clinic with any questions or concerns.  Patient was informed of access to emergency care. If patient were to develop any significant symptomatology, suicidal ideation, homicidal ideation, any concerns, or feel unsafe at any time they are to call the clinic and if unable to get immediate assistance should immediately call 911 or go to the nearest emergency room.  The patient is advised to remove or secure (lock away) all lethal weapons (including guns) and sharps (including razors, scissors, knives, etc.).  All medications (including any prescribed and any over the counter medications) should be stored in a safe and secured location that is not obtainable by children/adolescents.  Patient was given an opportunity and encouraged to ask questions about their medication,  illness, and treatment. Patient contracted verbally for the following: If you are experiencing an emotional crisis or have thoughts of harming yourself or others, please go to your nearest local emergency room or call 911. Will continue to re-assess medication response and side effects frequently to establish efficacy and ensure safety. Risks, any black box warnings, side effects, off label usage, and benefits of medication and treatment discussed with patient, along with potential adverse side effects of current and/or newly prescribed medication, alternative treatment options, and OTC medications.  Patient verbalized understanding of potential risks, any off label use of medication, any black box warnings, and any side effects in their own words. The patient verbalized understanding and agreed to comply with the safety plan discussed in their own words.  Patient given the number to the office. Number also available to the 24- hour suicide hotline.      MEDS ORDERED DURING VISIT:  New Medications Ordered This Visit   Medications    lamoTRIgine (LaMICtal) 25 MG tablet     Sig: Take 1 tablet PO Daily x14 days, then increase to 2 tablets PO Daily     Dispense:  46 tablet     Refill:  0       Return in about 4 weeks (around 5/29/2024), or if symptoms worsen or fail to improve, for Recheck.     Treatment plan completed: 5/1/24    Progress toward goal: Not at goal    Functional Status: Moderate impairment     Prognosis: Good with Ongoing Treatment         This document has been electronically signed by CHARITO Farfan  May 1, 2024 14:17 EDT    Some of the data in this electronic note has been brought forward from a previous encounter, any necessary changes have been made, it has been reviewed by this APRN, and it is accurate.    Please note that portions of this note were completed with a voice recognition program. Efforts were made to edit dictation, but occasionally words are mistranscribed.

## 2024-05-14 ENCOUNTER — TELEMEDICINE (OUTPATIENT)
Dept: PSYCHIATRY | Facility: CLINIC | Age: 24
End: 2024-05-14
Payer: COMMERCIAL

## 2024-05-14 DIAGNOSIS — F33.1 MDD (MAJOR DEPRESSIVE DISORDER), RECURRENT EPISODE, MODERATE: ICD-10-CM

## 2024-05-14 DIAGNOSIS — F41.1 GENERALIZED ANXIETY DISORDER: Primary | ICD-10-CM
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
## 2024-05-22 ENCOUNTER — DOCUMENTATION (OUTPATIENT)
Dept: OBSTETRICS AND GYNECOLOGY | Age: 24
End: 2024-05-22
Payer: COMMERCIAL

## 2024-05-22 RX ORDER — VALACYCLOVIR HYDROCHLORIDE 500 MG/1
500 TABLET, FILM COATED ORAL 2 TIMES DAILY
Qty: 6 TABLET | Refills: 0 | Status: SHIPPED | OUTPATIENT
Start: 2024-05-22 | End: 2024-05-23 | Stop reason: SDUPTHER

## 2024-05-23 ENCOUNTER — TELEPHONE (OUTPATIENT)
Dept: OBSTETRICS AND GYNECOLOGY | Age: 24
End: 2024-05-23
Payer: COMMERCIAL

## 2024-05-23 RX ORDER — VALACYCLOVIR HYDROCHLORIDE 500 MG/1
500 TABLET, FILM COATED ORAL 2 TIMES DAILY
Qty: 6 TABLET | Refills: 0 | Status: SHIPPED | OUTPATIENT
Start: 2024-05-23 | End: 2024-05-26

## 2024-05-23 NOTE — TELEPHONE ENCOUNTER
sent a rx for Valtrex yesterday and it went to the wrong pharmacy. She wants it to go to the [facility]'s on file. Thank you.

## 2024-05-29 ENCOUNTER — TELEMEDICINE (OUTPATIENT)
Dept: PSYCHIATRY | Facility: CLINIC | Age: 24
End: 2024-05-29
Payer: COMMERCIAL

## 2024-05-29 DIAGNOSIS — F39 UNSPECIFIED MOOD (AFFECTIVE) DISORDER: Chronic | ICD-10-CM

## 2024-05-29 DIAGNOSIS — F41.1 GENERALIZED ANXIETY DISORDER: Primary | Chronic | ICD-10-CM

## 2024-05-29 PROCEDURE — 99214 OFFICE O/P EST MOD 30 MIN: CPT | Performed by: NURSE PRACTITIONER

## 2024-05-29 PROCEDURE — 1159F MED LIST DOCD IN RCRD: CPT | Performed by: NURSE PRACTITIONER

## 2024-05-29 PROCEDURE — 1160F RVW MEDS BY RX/DR IN RCRD: CPT | Performed by: NURSE PRACTITIONER

## 2024-05-29 RX ORDER — LAMOTRIGINE 100 MG/1
100 TABLET ORAL DAILY
Qty: 30 TABLET | Refills: 1 | Status: SHIPPED | OUTPATIENT
Start: 2024-05-29

## 2024-05-29 NOTE — PROGRESS NOTES
This provider is completing this appointment through [facility] (through [facility]), [address], [facility], [address] using a secure [employer] Video Visit through [employer]. Patient is being seen remotely via telehealth at their workplace in [place], and stated they are in a secure environment for this session. The patient's condition being diagnosed/treated is appropriate for telemedicine. The provider identified herself as well as her credentials.   The patient, and/or patients guardian, consent to be seen remotely, and when consent is given they understand that the consent allows for patient identifiable information to be sent to a third party as needed.   They may refuse to be seen remotely at any time. The electronic data is encrypted and password protected, and the patient and/or guardian has been advised of the potential risks to privacy not withstanding such measures.    You have chosen to receive care through a telehealth visit.  Do you consent to use a video/audio connection for your medical care today? Yes    Patient identifiers utilized: Name and date of birth.        Subjective   Paulette Martinez is a 23 y.o. female who presents today for follow up    Chief Complaint:  Mood instability, depression, and anxiety    Accompanied by: Pt was alone for duration of appointment    History of Present Illness:   Pt states she has not noticed any difference since starting the Lamictal; she denies side effects. Pt reports having an episode last week where she sat on the couch for about two hours and broke down and cried. Earlier that day, she had court for her younger brother. He was sentenced to 20 years in prison for manslaughter and robbery. Also, there was a verbal altercation with the victim's family. This has been weighing on her. Pt reports her boyfriend has been supportive through this. Pt has difficulty falling and staying sleep. Pt is averaging 5 hours of broken  sleep. When she wakes up during the night, its take about an hour to fall back asleep. Appetite is decreased. Anxiety and depression have been problematic. The patient denies any new medical problems since last appointment with this facility. The patient reports compliance with current medication regimen. The patient denies any current side effects from their current medication regimen. The patient rates their depression on average in the past week at a 9/10 on a 0-10 scale, with 10 being the worst. The patient rates their anxiety on average the past week at a 7-8/10 on a 0-10 scale, with 10 being the worst. The patient would like to adjust their medications at this visit. The patient denies any suicidal or homicidal ideations, plans, or intent at today's encounter and is convincing. The patient denies any auditory hallucinations or visual hallucinations. The patient does not endorse any significant symptoms consistent with johnna or psychosis at today's encounter.     *If the patient has any concerns or needs assistance, they may call the [facility] at [phone]*        Prior Psychiatric Medications:  Zoloft    *Pt was on medication when in middle school and cannot recall names*        The following portions of the patient's history were reviewed and updated as appropriate: allergies, current medications, past family history, past medical history, past social history, past surgical history and problem list.          Past Medical History:  Past Medical History:   Diagnosis Date    Trichomoniasis 08/17/2020    Urinary tract infection 04/11/22       Social History:  Social History     Socioeconomic History    Marital status: Single   Tobacco Use    Smoking status: Some Days     Current packs/day: 0.25     Average packs/day: 0.3 packs/day for 4.0 years (1.0 ttl pk-yrs)     Types: Cigars, Cigarettes    Smokeless tobacco: Never   Vaping Use    Vaping status: Never Used   Substance and  Sexual Activity    Alcohol use: Yes     Alcohol/week: 1.0 standard drink of alcohol     Types: 1 Shots of liquor per week    Drug use: Not Currently     Comment: Used Xanax, Percocet when mother passed in 2020; occasional marijuana use    Sexual activity: Yes     Partners: Male     Birth control/protection: Birth control pill       Family History:  Family History   Problem Relation Age of Onset    Bipolar disorder Mother     ADD / ADHD Brother     Schizophrenia Maternal Uncle     Mental illness Maternal Grandmother     Hypertension Paternal Grandmother     Diabetes Paternal Grandmother     Breast cancer Neg Hx     Ovarian cancer Neg Hx     Uterine cancer Neg Hx     Colon cancer Neg Hx     Malig Hyperthermia Neg Hx        Past Surgical History:  Past Surgical History:   Procedure Laterality Date    D & C WITH SUCTION N/A 10/25/2023    Procedure: DILATATION AND CURETTAGE WITH SUCTION;  Surgeon: Cristy Metz MD;  Location: [facility];  Service: Obstetrics/Gynecology;  Laterality: N/A;       Problem List:  Patient Active Problem List   Diagnosis    Closed left ankle fracture, with routine healing, subsequent encounter       Allergy:   No Known Allergies     Current Medications:   Current Outpatient Medications   Medication Sig Dispense Refill    lamoTRIgine (LaMICtal) 100 MG tablet Take 1 tablet by mouth Daily. 30 tablet 1    norethindrone-ethinyl estradiol FE (Junel FE 1/20) 1-20 MG-MCG per tablet Take 1 tablet by mouth Daily. 84 tablet 3    sertraline (Zoloft) 50 MG tablet Take 1/2 tablet PO Daily x7 days, then increase to 1 tablet PO Daily 30 tablet 0     No current facility-administered medications for this visit.       Review of Symptoms:    Review of Systems   Constitutional:  Positive for appetite change and fatigue.   Psychiatric/Behavioral:  Positive for agitation, decreased concentration, dysphoric mood, sleep disturbance, depressed mood and stress. The patient is nervous/anxious.          Physical  Exam:   Due to the remote nature of this encounter (virtual encounter), vitals were unable to be obtained.  Height stated at 63 inches.  Weight stated at 150 pounds.      Physical Exam  Neurological:      Mental Status: She is alert.   Psychiatric:         Attention and Perception: Attention and perception normal. She does not perceive auditory or visual hallucinations.         Mood and Affect: Mood is anxious and depressed.         Speech: Speech normal.         Behavior: Behavior normal. Behavior is cooperative.         Thought Content: Thought content normal. Thought content is not paranoid or delusional. Thought content does not include homicidal or suicidal ideation. Thought content does not include homicidal or suicidal plan.         Cognition and Memory: Cognition and memory normal.         Judgment: Judgment is impulsive.           Mental Status Exam:   Hygiene:   good  Cooperation:  Cooperative  Eye Contact:  Good  Psychomotor Behavior:  Appropriate  Affect:   Appropriate  Mood: depressed and anxious  Speech:  Normal  Thought Process:  Goal directed and Linear  Thought Content:  Mood congruent  Suicidal:  None  Homicidal:  None  Hallucinations:  None  Delusion:  None  Memory:  Intact  Orientation:  Person, Place, Time, and Situation  Reliability:  fair  Insight:  Fair  Judgement:  Fair  Impulse Control:  Fair        PHQ-9 Depression Screening  Little interest or pleasure in doing things? (P) 1-->several days   Feeling down, depressed, or hopeless? (P) 3-->nearly every day   Trouble falling or staying asleep, or sleeping too much? (P) 1-->several days   Feeling tired or having little energy? (P) 3-->nearly every day   Poor appetite or overeating? (P) 3-->nearly every day   Feeling bad about yourself - or that you are a failure or have let yourself or your family down? (P) 3-->nearly every day   Trouble concentrating on things, such as reading the newspaper or watching television? (P) 0-->not at all   Moving  or speaking so slowly that other people could have noticed? Or the opposite - being so fidgety or restless that you have been moving around a lot more than usual? (P) 3-->nearly every day   Thoughts that you would be better off dead, or of hurting yourself in some way? (P) 0-->not at all   PHQ-9 Total Score (P) 17   If you checked off any problems, how difficult have these problems made it for you to do your work, take care of things at home, or get along with other people? (P) very difficult     PHQ-9 Total Score: (P) 17      TRACIE-7  Feeling nervous, anxious or on edge: (P) Nearly every day  Not being able to stop or control worrying: (P) More than half the days  Worrying too much about different things: (P) Nearly every day  Trouble Relaxing: (P) Nearly every day  Being so restless that it is hard to sit still: (P) Nearly every day  Feeling afraid as if something awful might happen: (P) Nearly every day  Becoming easily annoyed or irritable: (P) Nearly every day  TRACIE 7 Total Score: (P) 20  If you checked any problems, how difficult have these problems made it for you to do your work, take care of things at home, or get along with other people: (P) Very difficult      Previous Provider notes and available records reviewed by this APRN at today's encounter.         Lab Results:   No visits with results within 1 Month(s) from this visit.   Latest known visit with results is:   Office Visit on 04/09/2024   Component Date Value Ref Range Status    Glucose 04/09/2024 91  65 - 99 mg/dL Final    BUN 04/09/2024 9  6 - 20 mg/dL Final    Creatinine 04/09/2024 0.76  0.57 - 1.00 mg/dL Final    EGFR Result 04/09/2024 114.5  >60.0 mL/min/1.73 Final    Comment: GFR Normal >60  Chronic Kidney Disease <60  Kidney Failure <15      BUN/Creatinine Ratio 04/09/2024 11.8  7.0 - 25.0 Final    Sodium 04/09/2024 139  136 - 145 mmol/L Final    Potassium 04/09/2024 4.8  3.5 - 5.2 mmol/L Final    Chloride 04/09/2024 104  98 - 107 mmol/L Final     Total CO2 04/09/2024 25.3  22.0 - 29.0 mmol/L Final    Calcium 04/09/2024 9.5  8.6 - 10.5 mg/dL Final    Total Protein 04/09/2024 7.2  6.0 - 8.5 g/dL Final    Albumin 04/09/2024 4.6  3.5 - 5.2 g/dL Final    Globulin 04/09/2024 2.6  gm/dL Final    A/G Ratio 04/09/2024 1.8  g/dL Final    Total Bilirubin 04/09/2024 0.4  0.0 - 1.2 mg/dL Final    Alkaline Phosphatase 04/09/2024 64  39 - 117 U/L Final    AST (SGOT) 04/09/2024 13  1 - 32 U/L Final    ALT (SGPT) 04/09/2024 13  1 - 33 U/L Final    Specific Gravity, UA 04/09/2024 1.022  1.005 - 1.030 Final    pH, UA 04/09/2024 6.5  5.0 - 8.0 Final    Color, UA 04/09/2024 Yellow   Final    REFERENCE RANGE: Yellow, Straw    Appearance, UA 04/09/2024 Clear  Clear Final    Leukocytes, UA 04/09/2024 Negative  Negative Final    Protein 04/09/2024 Negative  Negative Final    Glucose, UA 04/09/2024 Negative  Negative Final    Ketones 04/09/2024 Negative  Negative Final    Blood, UA 04/09/2024 See below: (A)  Negative Final    Moderate (2+)    Bilirubin, UA 04/09/2024 Negative  Negative Final    Urobilinogen, UA 04/09/2024 Comment   Final    Comment: 0.2 E.U./dL  REFERENCE RANGE: 0.2 - 1.0 E.U./dL      Nitrite, UA 04/09/2024 Negative  Negative Final    Total Cholesterol 04/09/2024 164  0 - 200 mg/dL Final    Comment: Cholesterol Reference Ranges  (U.S. Department of Health and Human Services ATP III  Classifications)  Desirable          <200 mg/dL  Borderline High    200-239 mg/dL  High Risk          >240 mg/dL  Triglyceride Reference Ranges  (U.S. Department of Health and Human Services ATP III  Classifications)  Normal           <150 mg/dL  Borderline High  150-199 mg/dL  High             200-499 mg/dL  Very High        >500 mg/dL  HDL Reference Ranges  (U.S. Department of Health and Human Services ATP III  Classifications)  Low     <40 mg/dl (major risk factor for CHD)  High    >60 mg/dl ('negative' risk factor for CHD)  LDL Reference Ranges  (U.S. Department of Health and Human  Services ATP III  Classifications)  Optimal          <100 mg/dL  Near Optimal     100-129 mg/dL  Borderline High  130-159 mg/dL  High             160-189 mg/dL  Very High        >189 mg/dL      Triglycerides 04/09/2024 110  0 - 150 mg/dL Final    HDL Cholesterol 04/09/2024 54  40 - 60 mg/dL Final    VLDL Cholesterol Ayden 04/09/2024 20  5 - 40 mg/dL Final    LDL Chol Calc (NIH) 04/09/2024 90  0 - 100 mg/dL Final    Chol/HDL Ratio 04/09/2024 3.04   Final    TSH 04/09/2024 1.360  0.270 - 4.200 uIU/mL Final    WBC 04/09/2024 7.02  3.40 - 10.80 10*3/mm3 Final    RBC 04/09/2024 4.54  3.77 - 5.28 10*6/mm3 Final    Hemoglobin 04/09/2024 12.4  12.0 - 15.9 g/dL Final    Hematocrit 04/09/2024 38.4  34.0 - 46.6 % Final    MCV 04/09/2024 84.6  79.0 - 97.0 fL Final    MCH 04/09/2024 27.3  26.6 - 33.0 pg Final    MCHC 04/09/2024 32.3  31.5 - 35.7 g/dL Final    RDW 04/09/2024 14.9  12.3 - 15.4 % Final    Platelets 04/09/2024 252  140 - 450 10*3/mm3 Final    Neutrophil Rel % 04/09/2024 63.3  42.7 - 76.0 % Final    Lymphocyte Rel % 04/09/2024 26.8  19.6 - 45.3 % Final    Monocyte Rel % 04/09/2024 7.1  5.0 - 12.0 % Final    Eosinophil Rel % 04/09/2024 1.9  0.3 - 6.2 % Final    Basophil Rel % 04/09/2024 0.6  0.0 - 1.5 % Final    Neutrophils Absolute 04/09/2024 4.45  1.70 - 7.00 10*3/mm3 Final    Lymphocytes Absolute 04/09/2024 1.88  0.70 - 3.10 10*3/mm3 Final    Monocytes Absolute 04/09/2024 0.50  0.10 - 0.90 10*3/mm3 Final    Eosinophils Absolute 04/09/2024 0.13  0.00 - 0.40 10*3/mm3 Final    Basophils Absolute 04/09/2024 0.04  0.00 - 0.20 10*3/mm3 Final    Immature Granulocyte Rel % 04/09/2024 0.3  0.0 - 0.5 % Final    Immature Grans Absolute 04/09/2024 0.02  0.00 - 0.05 10*3/mm3 Final    nRBC 04/09/2024 0.0  0.0 - 0.2 /100 WBC Final    WBC, UA 04/09/2024 3-5 (A)  /HPF Final    REFERENCE RANGE: None Seen, 0-2    RBC, UA 04/09/2024 0-2  /HPF Final    REFERENCE RANGE: None Seen, 0-2    Epithelial Cells (non renal) 04/09/2024 3-6 (A)   /HPF Final    REFERENCE RANGE: None Seen, 0-2    Cast Type 04/09/2024 Comment   Final    HYALINE CASTS, UA            0-2              /LPF       None Seen  N    Bacteria, UA 04/09/2024 Comment  None Seen /HPF Final    None Seen         Assessment & Plan   Problems Addressed this Visit    None  Visit Diagnoses       Generalized anxiety disorder  (Chronic)   -  Primary    Relevant Medications    sertraline (Zoloft) 50 MG tablet    Unspecified mood (affective) disorder  (Chronic)       Relevant Medications    lamoTRIgine (LaMICtal) 100 MG tablet    sertraline (Zoloft) 50 MG tablet          Diagnoses         Codes Comments    Generalized anxiety disorder    -  Primary ICD-10-CM: F41.1  ICD-9-CM: 300.02     Unspecified mood (affective) disorder     ICD-10-CM: F39  ICD-9-CM: 296.90             Visit Diagnoses:    ICD-10-CM ICD-9-CM   1. Generalized anxiety disorder  F41.1 300.02   2. Unspecified mood (affective) disorder  F39 296.90          Rule out MDD versus bipolar disorder      GOALS:  Short Term Goals: Patient will be compliant with medication, and patient will have no significant medication related side effects.  Patient will be engaged in psychotherapy as indicated.  Patient will report subjective improvement of symptoms.  Long term goals: To stabilize mood and treat/improve subjective symptoms, the patient will stay out of the hospital, the patient will be at an optimal level of functioning, and the patient will take all medications as prescribed.  The patient verbalized understanding and agreement with goals that were mutually set.      TREATMENT PLAN:   Continue supportive psychotherapy efforts and medications as indicated.   -Increase Lamictal to 100 mg PO Daily for mood stabilization  -Start Zoloft 25 mg PO Daily x7 days, then increase to 50 mg PO Daily for anxiety and depression    This APRN has discussed the benefits and risks of taking/continuing Lamictal (Lamotrigine).  The side effects of Lamictal can  include a benign rash, blurred or double vision, dizziness, ataxia, sedation, headache, tremor, insomnia, poor coordination, fatigue,  nausea, vomiting, dyspepsia, rhinitis, infection, pharyngitis, asthenia, a rare but serious rash, rare multi-organ failure associated with Chapa-Jace Syndrome, toxic epidermal necrolysis, drug hypersensitivity syndrome, rare blood dyscrasias, rare aseptic meningitis, rare sudden unexplained deaths in people with epilepsy, withdrawal seizures upon abrupt withdrawal, and rare activation of suicidal ideation and behavior (suicidality).  This APRN has discussed that a very slow dose titration when starting, or changing doses, of Lamictal may reduce the incidence of skin rash and other side effects.  The dosage should not be titrated upwards or increased faster than recommended due to the possibility of the discussed side effects and risk of development of a skin rash (which can become life threatening).    This APRN has also discussed that if the patient stops taking the Lamictal for 3-5 days or longer, it will be necessary to restart the drug with an initial dose titration, as rashes have been reported on reexposure.  If the patient and Provider decide to stop the Lamictal, the patient will follow the directions of this APRN/this office as a guided taper over about two weeks is appropriate due to the risk of relapse in bipolar disorder with those with a mood or bipolar disorder, the risk of seizures in those with epilepsy, and discontinuation symptoms upon rapid discontinuation of Lamictal.    The patient verbalizes understanding of benefits and risks as discussed, the patient/guardian feels the benefits outweigh the risks and is agreeable to continue/take Lamictal as discussed.  The patient is advised should any side effects or rash develops they are to stop the Lamictal immediately and contact this APRN/this office or go to the emergency department immediately.  The patient  verbalizes understanding and agreement with treatment plan in their own words.    Medication and treatment options, both pharmacological and non-pharmacological treatment options, discussed during today's visit, including any off label use of medication. Patient acknowledged and verbally consented with current treatment plan and was educated on the importance of compliance with treatment and follow-up appointments.       MEDICATION ISSUES:    Discussed treatment plan and medication options of prescribed medication as well as the risks, benefits, any black box warnings, and side effects including potential falls, possible impaired driving, and metabolic adversities among others, including any off label use of medication. Patient is agreeable to call the office with any worsening of symptoms or onset of side effects, or if any concerns or questions arise.  The contact information for the office is made available to the patient. They may call the [facility] at [phone]. Patient is agreeable to call 911 or go to the nearest ER should they begin having any SI/HI, or if any urgent concerns arise.        SUICIDE RISK ASSESSMENT: Unalterable demographics and a history of mental health intervention indicate this patient is in a high risk category compared to the general population. At present, the patient denies active SI/HI, intentions, or plans at this time and agrees to seek immediate care should such thoughts develop. The patient verbalizes understanding of how to access emergency care if needed and agrees to do so. Consideration of suicide risk and protective factors such as history, current presentation, individual strengths and weaknesses, psychosocial and environmental stressors and variables, psychiatric illness and symptoms, medical conditions and pain, took place in this interview. Based on those considerations, the patient is determined: within individual baseline and presenting  no imminent risk for suicide or homicide. Other recommendations: The patient does not meet the criteria for inpatient admission and is not a safety risk to self or others at today's visit. Inpatient treatment offers no significant advantages over outpatient treatment for this patient at today's visit.      SAFETY PLAN:  Patient was given ample time for questions and fully participated in treatment planning.  Patient was encouraged to call the clinic with any questions or concerns.  Patient was informed of access to emergency care. If patient were to develop any significant symptomatology, suicidal ideation, homicidal ideation, any concerns, or feel unsafe at any time they are to call the clinic and if unable to get immediate assistance should immediately call 911 or go to the nearest emergency room.  The patient is advised to remove or secure (lock away) all lethal weapons (including guns) and sharps (including razors, scissors, knives, etc.).  All medications (including any prescribed and any over the counter medications) should be stored in a safe and secured location that is not obtainable by children/adolescents.  Patient was given an opportunity and encouraged to ask questions about their medication, illness, and treatment. Patient contracted verbally for the following: If you are experiencing an emotional crisis or have thoughts of harming yourself or others, please go to your nearest local emergency room or call 911. Will continue to re-assess medication response and side effects frequently to establish efficacy and ensure safety. Risks, any black box warnings, side effects, off label usage, and benefits of medication and treatment discussed with patient, along with potential adverse side effects of current and/or newly prescribed medication, alternative treatment options, and OTC medications.  Patient verbalized understanding of potential risks, any off label use of medication, any black box warnings, and  any side effects in their own words. The patient verbalized understanding and agreed to comply with the safety plan discussed in their own words.  Patient given the number to the office. Number also available to the 24- hour suicide hotline.      MEDS ORDERED DURING VISIT:  New Medications Ordered This Visit   Medications    lamoTRIgine (LaMICtal) 100 MG tablet     Sig: Take 1 tablet by mouth Daily.     Dispense:  30 tablet     Refill:  1    sertraline (Zoloft) 50 MG tablet     Sig: Take 1/2 tablet PO Daily x7 days, then increase to 1 tablet PO Daily     Dispense:  30 tablet     Refill:  0       Return in about 4 weeks (around 6/26/2024), or if symptoms worsen or fail to improve, for Recheck.     Treatment plan completed: 5/1/24    Progress toward goal: Not at goal    Functional Status: Moderate impairment     Prognosis: Good with Ongoing Treatment         This document has been electronically signed by CHARITO Farfan  May 29, 2024 13:35 EDT    Some of the data in this electronic note has been brought forward from a previous encounter, any necessary changes have been made, it has been reviewed by this APRN, and it is accurate.    Please note that portions of this note were completed with a voice recognition program. Efforts were made to edit dictation, but occasionally words are mistranscribed.

## 2024-05-30 DIAGNOSIS — F39 UNSPECIFIED MOOD (AFFECTIVE) DISORDER: Chronic | ICD-10-CM

## 2024-05-30 RX ORDER — LAMOTRIGINE 25 MG/1
TABLET ORAL
Qty: 46 TABLET | Refills: 0 | OUTPATIENT
Start: 2024-05-30

## 2024-06-10 ENCOUNTER — TELEMEDICINE (OUTPATIENT)
Dept: PSYCHIATRY | Facility: CLINIC | Age: 24
End: 2024-06-10
Payer: COMMERCIAL

## 2024-06-10 DIAGNOSIS — F41.1 GENERALIZED ANXIETY DISORDER: ICD-10-CM

## 2024-06-10 DIAGNOSIS — F33.1 MDD (MAJOR DEPRESSIVE DISORDER), RECURRENT EPISODE, MODERATE: Primary | ICD-10-CM

## 2024-06-10 PROCEDURE — 1160F RVW MEDS BY RX/DR IN RCRD: CPT | Performed by: COUNSELOR

## 2024-06-10 PROCEDURE — 1159F MED LIST DOCD IN RCRD: CPT | Performed by: COUNSELOR

## 2024-06-10 PROCEDURE — 90834 PSYTX W PT 45 MINUTES: CPT | Performed by: COUNSELOR

## 2024-06-10 NOTE — PATIENT INSTRUCTIONS
[place] warm Line: Phone number: [phone]      Monday through Friday 10 AM to 9 PM and Saturdays 5 PM to 9 PM.      A warmline is a NON-CRISIS number to call to get emotional support. You can call to have a conversation with someone who can provide support during hard times. Warmlines are staffed by trained peers who have been through their own mental health struggles and know what it's like to need help.      -Suicide hotline number: 988      -[facility] Resource Connection      The resource line is dedicated to providing behavioral health resources to residents of [place] and [facility], seven days a week, 7 a.m.-7 p.m.      [phone]      [email or web address]  [email or web address]      Should you ever need assistance or just want to reach out to someone when your behavioral health provider is not available due to the office being closed you can contact [email or web address].       -Just text HOME to 895429 and someone will reach out to you within a few minutes.  This is a 24/7 help line and they are open even on holidays.

## 2024-06-10 NOTE — PROGRESS NOTES
"Date: June 14, 2024  Time In: 8:03  Time out: 8:43      This provider is located at the [facility] [address], [place] [address] using a secure [employer] Video Visit through [employer]. Patient is being seen remotely via telehealth at home address in [place] and stated they are in a secure environment for this session. The patient's condition being diagnosed/treated is appropriate for telemedicine. The provider identified herself as well as her credentials. The patient, and/or patients guardian, consent to be seen remotely, and when consent is given they understand that the consent allows for patient identifiable information to be sent to a third party as needed. They may refuse to be seen remotely at any time. The electronic data is encrypted and password protected, and the patient and/or guardian has been advised of the potential risks to privacy not withstanding such measures.     You have chosen to receive care through a telehealth visit.  Do you consent to use a video/audio connection for your medical care today? Yes    Subjective   Paulette Martinez is a 23 y.o. female who presents today fully oriented, appropriately dressed and groomed, and open to communication for follow up appointment.    Chief Complaint:   Chief Complaint   Patient presents with    Anxiety    Depression        History of Present Illness: Rapport was established through conversation and unconditional positive regard. Recent events were discussed and how these events impact Pt's emotional health.   Pt reports successful use of learned coping skills 6 out of 10 times since last report.  Pt reports continuation of symptoms and states the intensity and duration of symptoms has improved since last report. Pt rates anxiety at 10/10 and depression at 7/10.     Sleep: Pt reports sleep has \"bee about the same\" since last report. Healthy sleep habits were discussed such as maintaining " "a schedule, routine, avoiding caffeine, and limiting screen time before bed. Difficult sleeping and then waking up. Sleep in intermittent. Sleep is non restorative.     Appetite:  Pt reports appetite has been good since last report.  Discussed the importance of hydration and eating a healthy diet for overall and mental health.  Some diminished appetite     Medication compliance: Pt reports medications are being taken daily as prescribed. Discussed the importance of compliance with prescriber's directions and Pt was instructed to report questions/concerns, as well as missed doses or discontinuation of medication by Pt.   Pt states she did miss two doses recently and she felt \"my body would just go off\" and her mind would wander.  Pt states she did not do anything but sit on the couch one day when she missed a dose.  Denied having and suicidal thoughts associates with missed doses.     Safety Plan in Place: No Pt denies SI/HI/SIB recent or current        Daily Functioning:  Since last report, Pt states symptoms are causing Moderate difficulty in daily functioning.      Subjective Report:  Pt states she often is hard on herself and she has \"always always felt like this.\"  Pt states it really started when she got pregnant and dropped out of college.  States she feels down when she cannot help out with her family and siblings. States she feels she failed her grandmother and auntie, especially when she got pregnant again when her baby was only 8 months old. Reports she terminated that pregnancy, and Pt has not processed this.  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 Discussed how timelines in life are subjective and she should not compare herself to others. States family and friends are supportive of Pt, but she feels like she does not do enough for them.  States father is black and mom is [place] and she was never around her mom.  States father's " "mother raised her and they never discussed depression or anxiety in their family.  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  Discussed how Pt can replace self defeating statements such as \"I'm just an MA\" with affirming statements. Discussed ways Pt can identify things she has accomplished and the positive influence she has on patients and others in her life, such as helping her patients get through a painful procedure. Discussed refraining from belittling herself and to list positive traits daily (I.e, empathetic, hard working, honest.) Discussed how Pt has plenty of time to pursue her educational and career goals and to see her situation as what is happening here and now.  Encouraged mindfulness and refraining from borrowing stress and defeat from her future self.    Reviewed coping skills and encouraged Pt to continue to practicing coping skills daily when not under distress. Pt was praised for their attempts to decrease symptoms and mitigate activating events.    Clinical Maneuvering/Intervention:  CBT was utilized to encourage Pt to identify maladaptive thoughts and behaviors and replace with more affirming and positive.Pt encouraged to set and maintain appropriate and healthy boundaries with others. Pt was instructed to practice daily using appropriate and specific words to communicate feelings to others.  Motivational interviewing used to encourage Pt to identify strengths which can be utilized in working toward treatment goals. Pt encouraged to practice daily learned skills such as controlled breathing, grounding, and mindfulness. Pt was encouraged to ask for help from support persons to assist them in maintaining stability and alleviate symptoms. Discussed the importance of being one's own mental health advocate and to refrain from seeing the need to ask for help as a weakness. Pt was encouraged to formulate a plan of action to be more " proactive in managing stressors and refrain from using reactive and automatic heightened emotional responses.     Solution-focused therapy employed to identify how Pt would like their life to be if they were to make positive changes. Pt encouraged to identify effective coping skills and strengths they can use to continue utilizing those skills. Pt encouraged to discontinue utilizing non-effective coping mechanisms. Pt provided with feedback to highlight achievements and personal and other resources. Encouraged use of SMART goals    Assisted patient in processing above session content; acknowledged and normalized patient’s thoughts, feelings, and concerns.  Rationalized patient thought process regarding concerns presented at session.  Discussed triggers associated with patient's  anxiety  and depression  Also discussed coping skills for patient to implement such as positive self talk     Allowed patient to freely discuss issues without interruption or judgment. Provided safe, confidential environment to facilitate the development of positive therapeutic relationship and encourage open, honest communication. Assisted patient in identifying risk factors which would indicate the need for higher level of care including thoughts to harm self or others and/or self-harming behavior and encouraged patient to contact this office, call 911, or present to the nearest emergency room should any of these events occur. Discussed crisis intervention services and means to access. Patient adamantly and convincingly denies current suicidal or homicidal ideation or perceptual disturbance.    Assessment:     Patient appears to maintain relative stability as compared to their baseline.  However, patient persistently struggles with symptoms which continue to cause impairment in important areas of functioning.  As a result, they can be reasonably expected to continue to benefit from treatment and would likely be at increased risk for  decompensation otherwise.      PHQ-Score Total:  PHQ-9 Total Score: 19    TRACIE-7 Score Total:         Mental Status Exam:   Hygiene:   good  Cooperation:  Cooperative  Eye Contact:  Good  Psychomotor Behavior:  Appropriate  Affect:  Full range  Mood: anxious and panicky  Speech:  Normal  Thought Process:  Goal directed  Thought Content:  Normal  Suicidal:  None  Homicidal: None  Hallucinations:  None  Delusion: None  Memory:  Intact  Orientation:  Grossly Intact  Reliability:  good  Insight:  Good  Judgement:  Good  Impulse Control:  Good  Physical/Medical Issues:  No        Functional Status: Moderate impairment     Progress toward goal: Not at goal    Prognosis: Good with continued therapeutic intervention        Plan:    Patient will continue in individual outpatient therapy with focus on improved functioning and coping skills, maintaining stability, and avoiding decompensation and the need for higher level of care.    Patient will adhere to medication regimen as prescribed and report any side effects. Patient will contact this office, call 911 or present to the nearest emergency room should suicidal or homicidal ideations occur. Provide Cognitive Behavioral Therapy and Solution Focused Therapy to improve functioning, maintain stability, and avoid decompensation and the need for higher level of care.     Return in about 1 week (around 6/17/2024).      VISIT DIAGNOSIS:    Diagnosis Plan   1. MDD (major depressive disorder), recurrent episode, moderate        2. Generalized anxiety disorder         08:05 EDT       This document has been electronically signed by RUBY Rasmussen  June 14, 2024      Part of this note may be an electronic transcription/translation of spoken language to printed text using the Dragon Dictation System.

## 2024-06-14 ENCOUNTER — TELEPHONE (OUTPATIENT)
Dept: OBSTETRICS AND GYNECOLOGY | Age: 24
End: 2024-06-14
Payer: COMMERCIAL

## 2024-06-14 DIAGNOSIS — N92.6 MISSED MENSES: Primary | ICD-10-CM

## 2024-07-01 ENCOUNTER — TELEPHONE (OUTPATIENT)
Dept: PSYCHIATRY | Facility: CLINIC | Age: 24
End: 2024-07-01

## 2024-07-01 NOTE — TELEPHONE ENCOUNTER
Patient no-showed today's appointment; appointment was for Hailee Mckoy, attempted to reach patient to reschedule missed appointment. Sent patient mychart message reminding patient of our no show policy.

## 2024-07-09 ENCOUNTER — HOSPITAL ENCOUNTER (EMERGENCY)
Facility: HOSPITAL | Age: 24
Discharge: HOME | End: 2024-07-09
Attending: EMERGENCY MEDICINE
Payer: COMMERCIAL

## 2024-07-09 ENCOUNTER — TELEPHONE (OUTPATIENT)
Dept: OBSTETRICS AND GYNECOLOGY | Age: 24
End: 2024-07-09
Payer: COMMERCIAL

## 2024-07-09 VITALS
BODY MASS INDEX: 26.72 KG/M2 | DIASTOLIC BLOOD PRESSURE: 49 MMHG | RESPIRATION RATE: 16 BRPM | WEIGHT: 150.79 LBS | TEMPERATURE: 98.6 F | HEART RATE: 75 BPM | HEIGHT: 63 IN | OXYGEN SATURATION: 100 % | SYSTOLIC BLOOD PRESSURE: 108 MMHG

## 2024-07-09 DIAGNOSIS — O21.9 NAUSEA AND VOMITING IN PREGNANCY: Primary | ICD-10-CM

## 2024-07-09 LAB
ALBUMIN SERPL-MCNC: 4.3 G/DL (ref 3.5–5.2)
ALBUMIN/GLOB SERPL: 1.7 G/DL
ALP SERPL-CCNC: 52 U/L (ref 39–117)
ALT SERPL W P-5'-P-CCNC: 10 U/L (ref 1–33)
ANION GAP SERPL CALCULATED.3IONS-SCNC: 10 MMOL/L (ref 5–15)
AST SERPL-CCNC: 12 U/L (ref 1–32)
BACTERIA UR QL AUTO: ABNORMAL /HPF
BASOPHILS # BLD AUTO: 0.01 10*3/MM3 (ref 0–0.2)
BASOPHILS NFR BLD AUTO: 0.1 % (ref 0–1.5)
BILIRUB SERPL-MCNC: 0.5 MG/DL (ref 0–1.2)
BILIRUB UR QL STRIP: NEGATIVE
BUN SERPL-MCNC: 6 MG/DL (ref 6–20)
BUN/CREAT SERPL: 12 (ref 7–25)
CALCIUM SPEC-SCNC: 9.3 MG/DL (ref 8.6–10.5)
CHLORIDE SERPL-SCNC: 102 MMOL/L (ref 98–107)
CLARITY UR: ABNORMAL
CO2 SERPL-SCNC: 23 MMOL/L (ref 22–29)
COLOR UR: YELLOW
CREAT SERPL-MCNC: 0.5 MG/DL (ref 0.57–1)
DEPRECATED RDW RBC AUTO: 42.8 FL (ref 37–54)
EGFRCR SERPLBLD CKD-EPI 2021: 137 ML/MIN/1.73
EOSINOPHIL # BLD AUTO: 0.02 10*3/MM3 (ref 0–0.4)
EOSINOPHIL NFR BLD AUTO: 0.2 % (ref 0.3–6.2)
ERYTHROCYTE [DISTWIDTH] IN BLOOD BY AUTOMATED COUNT: 13.7 % (ref 12.3–15.4)
GLOBULIN UR ELPH-MCNC: 2.6 GM/DL
GLUCOSE SERPL-MCNC: 84 MG/DL (ref 65–99)
GLUCOSE UR STRIP-MCNC: NEGATIVE MG/DL
HCT VFR BLD AUTO: 37 % (ref 34–46.6)
HGB BLD-MCNC: 12.2 G/DL (ref 12–15.9)
HGB UR QL STRIP.AUTO: NEGATIVE
HYALINE CASTS UR QL AUTO: ABNORMAL /LPF
IMM GRANULOCYTES # BLD AUTO: 0.02 10*3/MM3 (ref 0–0.05)
IMM GRANULOCYTES NFR BLD AUTO: 0.2 % (ref 0–0.5)
KETONES UR QL STRIP: ABNORMAL
LEUKOCYTE ESTERASE UR QL STRIP.AUTO: ABNORMAL
LYMPHOCYTES # BLD AUTO: 1.97 10*3/MM3 (ref 0.7–3.1)
LYMPHOCYTES NFR BLD AUTO: 22.9 % (ref 19.6–45.3)
MCH RBC QN AUTO: 28.6 PG (ref 26.6–33)
MCHC RBC AUTO-ENTMCNC: 33 G/DL (ref 31.5–35.7)
MCV RBC AUTO: 86.7 FL (ref 79–97)
MONOCYTES # BLD AUTO: 0.6 10*3/MM3 (ref 0.1–0.9)
MONOCYTES NFR BLD AUTO: 7 % (ref 5–12)
NEUTROPHILS NFR BLD AUTO: 6 10*3/MM3 (ref 1.7–7)
NEUTROPHILS NFR BLD AUTO: 69.6 % (ref 42.7–76)
NITRITE UR QL STRIP: NEGATIVE
NRBC BLD AUTO-RTO: 0 /100 WBC (ref 0–0.2)
PH UR STRIP.AUTO: 7.5 [PH] (ref 5–8)
PLATELET # BLD AUTO: 243 10*3/MM3 (ref 140–450)
PMV BLD AUTO: 9.5 FL (ref 6–12)
POTASSIUM SERPL-SCNC: 4.1 MMOL/L (ref 3.5–5.2)
PROT SERPL-MCNC: 6.9 G/DL (ref 6–8.5)
PROT UR QL STRIP: NEGATIVE
RBC # BLD AUTO: 4.27 10*6/MM3 (ref 3.77–5.28)
RBC # UR STRIP: ABNORMAL /HPF
REF LAB TEST METHOD: ABNORMAL
SODIUM SERPL-SCNC: 135 MMOL/L (ref 136–145)
SP GR UR STRIP: 1.02 (ref 1–1.03)
SQUAMOUS #/AREA URNS HPF: ABNORMAL /HPF
UROBILINOGEN UR QL STRIP: ABNORMAL
WBC # UR STRIP: ABNORMAL /HPF
WBC NRBC COR # BLD AUTO: 8.62 10*3/MM3 (ref 3.4–10.8)

## 2024-07-09 PROCEDURE — 99283 EMERGENCY DEPT VISIT LOW MDM: CPT

## 2024-07-09 PROCEDURE — 80053 COMPREHEN METABOLIC PANEL: CPT | Performed by: PHYSICIAN ASSISTANT

## 2024-07-09 PROCEDURE — 25810000003 SODIUM CHLORIDE 0.9 % SOLUTION: Performed by: PHYSICIAN ASSISTANT

## 2024-07-09 PROCEDURE — 25010000002 METOCLOPRAMIDE PER 10 MG: Performed by: PHYSICIAN ASSISTANT

## 2024-07-09 PROCEDURE — 85025 COMPLETE CBC W/AUTO DIFF WBC: CPT | Performed by: PHYSICIAN ASSISTANT

## 2024-07-09 PROCEDURE — 96374 THER/PROPH/DIAG INJ IV PUSH: CPT

## 2024-07-09 PROCEDURE — 81001 URINALYSIS AUTO W/SCOPE: CPT | Performed by: PHYSICIAN ASSISTANT

## 2024-07-09 RX ORDER — SODIUM CHLORIDE 0.9 % (FLUSH) 0.9 %
10 SYRINGE (ML) INJECTION AS NEEDED
Status: DISCONTINUED | OUTPATIENT
Start: 2024-07-09 | End: 2024-07-09 | Stop reason: HOSPADM

## 2024-07-09 RX ORDER — METOCLOPRAMIDE HYDROCHLORIDE 5 MG/ML
10 INJECTION INTRAMUSCULAR; INTRAVENOUS ONCE
Status: COMPLETED | OUTPATIENT
Start: 2024-07-09 | End: 2024-07-09

## 2024-07-09 RX ADMIN — Medication 10 ML: at 15:46

## 2024-07-09 RX ADMIN — METOCLOPRAMIDE 10 MG: 5 INJECTION, SOLUTION INTRAMUSCULAR; INTRAVENOUS at 16:00

## 2024-07-09 RX ADMIN — SODIUM CHLORIDE 1000 ML: 9 INJECTION, SOLUTION INTRAVENOUS at 15:59

## 2024-07-09 NOTE — ED PROVIDER NOTES
EMERGENCY DEPARTMENT ENCOUNTER      PCP: Ravin Roberts APRN  Patient Care Team:  Ravin Roberts APRN as PCP - General (Nurse Practitioner)  Cristy Metz MD as PCP - Ob/Gyn (Obstetrics and Gynecology)  Madonna Rodgers PA as Physician Assistant (Obstetrics and Gynecology)  Hailee Mckoy APRN as Nurse Practitioner (Behavioral Health)  [facility]  ([facility])   Independent Historians: Patient    HPI:  Chief Complaint: Vomiting during pregnancy  A complete HPI/ROS/PMH/PSH/SH/FH are unobtainable due to: None    Chronic or social conditions impacting patient care (social determinants of health): None    Context: Paulette Martinez is a 23 y.o. female who presents to the ED c/o acute nausea and vomiting in pregnancy. Pt reports symptoms began shortly after finding out she was pregnant. LMP 5/14/24. She states this is second pregnancy and she did experience similar symptoms during first pregnancy. She is not taking anything for nausea at this time. She has not been able to keep anything down x 2 days.    Review of prior external notes and/or external test results outside of this encounter: US on 6/28/24 showed IUP with estimated 5w5d.      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    Marijuana use 03/30/2022    Rh negative, antepartum 03/30/2022    Nausea and vomiting in pregnancy 04/15/2022    UTI (urinary tract infection) during pregnancy 04/15/2022    Maternal anemia in pregnancy, antepartum 08/09/2022    LGA (large for gestational age) fetus affecting management of mother 08/22/2022    Excessive weight gain affecting pregnancy 09/23/2022    GBS (group B Streptococcus carrier), +RV culture, currently pregnant 10/14/2022    Shoulder dystocia,  delivered 2022    Vaginal delivery 2022    PPH (postpartum hemorrhage) 2022    Nausea and vomiting during pregnancy 2023    Unplanned pregnancy 2023    Retained products of conception following  10/18/2023     Past Medical History:   Diagnosis Date    Urinary tract infection 22       The patient qualifies to receive the vaccine, but they have not yet received it.    PAST SURGICAL HISTORY  Past Surgical History:   Procedure Laterality Date    D & C WITH SUCTION N/A 10/25/2023    Procedure: DILATATION AND CURETTAGE WITH SUCTION;  Surgeon: Cristy Metz MD;  Location: [facility] OR;  Service: Obstetrics/Gynecology;  Laterality: N/A;         FAMILY HISTORY  Family History   Problem Relation Age of Onset    Bipolar disorder Mother     ADD / ADHD Brother     Schizophrenia Maternal Uncle     Mental illness Maternal Grandmother     Hypertension Paternal Grandmother     Diabetes Paternal Grandmother     Breast cancer Neg Hx     Ovarian cancer Neg Hx     Uterine cancer Neg Hx     Colon cancer Neg Hx     Malig Hyperthermia Neg Hx          SOCIAL HISTORY  Social History     Socioeconomic History    Marital status: Single   Tobacco Use    Smoking status: Some Days     Current packs/day: 0.25     Average packs/day: 0.3 packs/day for 4.0 years (1.0 ttl pk-yrs)     Types: Cigars, Cigarettes    Smokeless tobacco: Never   Vaping Use    Vaping status: Never Used   Substance and Sexual Activity    Alcohol use: Yes     Alcohol/week: 1.0 standard drink of alcohol     Types: 1 Shots of liquor per week    Drug use: Not Currently     Comment: Used Xanax, Percocet when mother passed in ; occasional marijuana use    Sexual activity: Yes     Partners: Male     Birth control/protection: Birth control pill         ALLERGIES  Patient has no known allergies.        REVIEW OF SYSTEMS  Review of Systems   Constitutional:  Negative for fever.   Gastrointestinal:  Positive for nausea and  vomiting.   Genitourinary:  Negative for dysuria, vaginal bleeding and vaginal discharge.        All systems reviewed and negative except for those discussed in HPI.       PHYSICAL EXAM    I have reviewed the triage vital signs and nursing notes.    ED Triage Vitals [07/09/24 1525]   Temp Heart Rate Resp BP SpO2   98.6 °F (37 °C) 95 16 -- 95 %      Temp src Heart Rate Source Patient Position BP Location FiO2 (%)   -- -- -- -- --       Physical Exam  GENERAL: alert, no acute distress  SKIN: Warm, dry  HENT: Normocephalic, atraumatic  EYES: no scleral icterus  CV: regular rhythm, regular rate  RESPIRATORY: normal effort, lungs clear  ABDOMEN: soft, nontender, nondistended  MUSCULOSKELETAL: no deformity  NEURO: alert, moves all extremities, follows commands          LAB RESULTS  Recent Results (from the past 24 hour(s))   Comprehensive Metabolic Panel    Collection Time: 07/09/24  3:46 PM    Specimen: Blood   Result Value Ref Range    Glucose 84 65 - 99 mg/dL    BUN 6 6 - 20 mg/dL    Creatinine 0.50 (L) 0.57 - 1.00 mg/dL    Sodium 135 (L) 136 - 145 mmol/L    Potassium 4.1 3.5 - 5.2 mmol/L    Chloride 102 98 - 107 mmol/L    CO2 23.0 22.0 - 29.0 mmol/L    Calcium 9.3 8.6 - 10.5 mg/dL    Total Protein 6.9 6.0 - 8.5 g/dL    Albumin 4.3 3.5 - 5.2 g/dL    ALT (SGPT) 10 1 - 33 U/L    AST (SGOT) 12 1 - 32 U/L    Alkaline Phosphatase 52 39 - 117 U/L    Total Bilirubin 0.5 0.0 - 1.2 mg/dL    Globulin 2.6 gm/dL    A/G Ratio 1.7 g/dL    BUN/Creatinine Ratio 12.0 7.0 - 25.0    Anion Gap 10.0 5.0 - 15.0 mmol/L    eGFR 137.0 >60.0 mL/min/1.73   CBC Auto Differential    Collection Time: 07/09/24  3:46 PM    Specimen: Blood   Result Value Ref Range    WBC 8.62 3.40 - 10.80 10*3/mm3    RBC 4.27 3.77 - 5.28 10*6/mm3    Hemoglobin 12.2 12.0 - 15.9 g/dL    Hematocrit 37.0 34.0 - 46.6 %    MCV 86.7 79.0 - 97.0 fL    MCH 28.6 26.6 - 33.0 pg    MCHC 33.0 31.5 - 35.7 g/dL    RDW 13.7 12.3 - 15.4 %    RDW-SD 42.8 37.0 - 54.0 fl    MPV 9.5 6.0 -  12.0 fL    Platelets 243 140 - 450 10*3/mm3    Neutrophil % 69.6 42.7 - 76.0 %    Lymphocyte % 22.9 19.6 - 45.3 %    Monocyte % 7.0 5.0 - 12.0 %    Eosinophil % 0.2 (L) 0.3 - 6.2 %    Basophil % 0.1 0.0 - 1.5 %    Immature Grans % 0.2 0.0 - 0.5 %    Neutrophils, Absolute 6.00 1.70 - 7.00 10*3/mm3    Lymphocytes, Absolute 1.97 0.70 - 3.10 10*3/mm3    Monocytes, Absolute 0.60 0.10 - 0.90 10*3/mm3    Eosinophils, Absolute 0.02 0.00 - 0.40 10*3/mm3    Basophils, Absolute 0.01 0.00 - 0.20 10*3/mm3    Immature Grans, Absolute 0.02 0.00 - 0.05 10*3/mm3    nRBC 0.0 0.0 - 0.2 /100 WBC   Urinalysis With Microscopic If Indicated (No Culture) - Urine, Clean Catch    Collection Time: 07/09/24  5:33 PM    Specimen: Urine, Clean Catch   Result Value Ref Range    Color, UA Yellow Yellow, Straw    Appearance, UA Turbid (A) Clear    pH, UA 7.5 5.0 - 8.0    Specific Gravity, UA 1.023 1.005 - 1.030    Glucose, UA Negative Negative    Ketones, UA 80 mg/dL (3+) (A) Negative    Bilirubin, UA Negative Negative    Blood, UA Negative Negative    Protein, UA Negative Negative    Leuk Esterase, UA Small (1+) (A) Negative    Nitrite, UA Negative Negative    Urobilinogen, UA 1.0 E.U./dL 0.2 - 1.0 E.U./dL   Urinalysis, Microscopic Only - Urine, Clean Catch    Collection Time: 07/09/24  5:33 PM    Specimen: Urine, Clean Catch   Result Value Ref Range    RBC, UA 0-2 None Seen, 0-2 /HPF    WBC, UA 11-20 (A) None Seen, 0-2 /HPF    Bacteria, UA None Seen None Seen /HPF    Squamous Epithelial Cells, UA 7-12 (A) None Seen, 0-2 /HPF    Hyaline Casts, UA 0-2 None Seen /LPF    Methodology Automated Microscopy        Ordered the above labs and independently reviewed and interpreted the results.        RADIOLOGY  No Radiology Exams Resulted Within Past 24 Hours    I ordered the above noted radiological studies. Independently reviewed and interpreted by me.  See dictation for official radiology interpretation.      PROCEDURES    Procedures      MEDICATIONS  GIVEN IN ER    Medications   sodium chloride 0.9 % bolus 1,000 mL (0 mL Intravenous Stopped 7/9/24 1728)   metoclopramide (REGLAN) injection 10 mg (10 mg Intravenous Given 7/9/24 1600)         PROGRESS, DATA ANALYSIS, CONSULTS, AND MEDICAL DECISION MAKING    All labs have been independently reviewed and interpreted by me.  All radiology studies have been independently reviewed and interpreted by me and discussed with radiologist dictating the report.   EKG's independently reviewed and interpreted by me.  Discussion below represents my analysis of pertinent findings related to patient's condition, differential diagnosis, treatment plan and final disposition.    Differential diagnosis: viral illness, hyperemesis gravidarum, giovanni    ED Course as of 07/10/24 0018   Tue Jul 09, 2024   1603 WBC: 8.62 [DC]   1603 Hemoglobin: 12.2 [DC]   1621 Creatinine(!): 0.50 [DC]   1621 Sodium(!): 135 [DC]   1621 Potassium: 4.1 [DC]   1621 Glucose: 84 [DC]   1747 Protein, UA: Negative [DC]   1747 Bacteria, UA: None Seen [DC]   1756 Patient reevaluated and reports feeling improved.  Will discharge home with prescription for doxylamine-pyridoxine and recommended close follow-up with OB/GYN.  Return precautions discussed. [DC]      ED Course User Index  [DC] Rhea Brennan PA             AS OF 00:18 EDT VITALS:    BP - 108/49  HR - 75  TEMP - 98.6 °F (37 °C)  O2 SATS - 100%        DIAGNOSIS  Final diagnoses:   Nausea and vomiting in pregnancy         DISPOSITION  ED Disposition       ED Disposition   Discharge    Condition   Stable    Comment   --                  Note Disclaimer: At [facility], we believe that sharing information builds trust and better relationships. You are receiving this note because you recently visited [facility]. It is possible you will see health information before a provider has talked with you about it. This kind of information can be easy to misunderstand. To help you fully understand what it means for  your health, we urge you to discuss this note with your provider.         Rhea Brennan PA  07/10/24 0018

## 2024-07-09 NOTE — ED PROVIDER NOTES
MD ATTESTATION NOTE    SHARED VISIT: This visit was performed by BOTH a physician and an APC. The substantive portion of the medical decision making was performed by this attesting physician who made or approved the management plan and takes responsibility for patient management. All studies in the APC note (if performed) were independently interpreted by me.     The PAUL and I have discussed this patient's history, physical exam, and treatment plan.  I have reviewed the documentation and affirm the documentation and agree with the treatment and plan.  The attached note describes my personal findings.      Independent Historians: Patient    A complete HPI/ROS/PMH/PSH/SH/FH are unobtainable due to: None    Chronic or social conditions impacting patient care (social determinants of health): None    Paulette Martinez is a 23 y.o. female who presents to the ED c/o acute nausea and vomiting and inability to tolerate much of anything orally for the past 2 days.  Patient's last menstrual period was May 14.  Patient has been dealing with nausea and vomiting feels like it is getting worse.  Patient is a G2, P1 and did experience hyperemesis with her first pregnancy.  Patient not on any medication for nausea or vomiting currently.  Patient denies any vaginal bleeding or spotting.  She denies any pelvic pain.          On exam:  GENERAL: Cooperative and conversant female, alert, no acute distress  SKIN: Warm, dry  HENT: Normocephalic, atraumatic  RESPIRATORY: Relaxed breathing  NEURO: alert, moves all extremities, follows commands                                                             Labs  Recent Results (from the past 24 hour(s))   Comprehensive Metabolic Panel    Collection Time: 07/09/24  3:46 PM    Specimen: Blood   Result Value Ref Range    Glucose 84 65 - 99 mg/dL    BUN 6 6 - 20 mg/dL    Creatinine 0.50 (L) 0.57 - 1.00 mg/dL    Sodium 135 (L) 136 - 145 mmol/L    Potassium 4.1 3.5 - 5.2 mmol/L    Chloride 102 98 - 107  mmol/L    CO2 23.0 22.0 - 29.0 mmol/L    Calcium 9.3 8.6 - 10.5 mg/dL    Total Protein 6.9 6.0 - 8.5 g/dL    Albumin 4.3 3.5 - 5.2 g/dL    ALT (SGPT) 10 1 - 33 U/L    AST (SGOT) 12 1 - 32 U/L    Alkaline Phosphatase 52 39 - 117 U/L    Total Bilirubin 0.5 0.0 - 1.2 mg/dL    Globulin 2.6 gm/dL    A/G Ratio 1.7 g/dL    BUN/Creatinine Ratio 12.0 7.0 - 25.0    Anion Gap 10.0 5.0 - 15.0 mmol/L    eGFR 137.0 >60.0 mL/min/1.73   CBC Auto Differential    Collection Time: 07/09/24  3:46 PM    Specimen: Blood   Result Value Ref Range    WBC 8.62 3.40 - 10.80 10*3/mm3    RBC 4.27 3.77 - 5.28 10*6/mm3    Hemoglobin 12.2 12.0 - 15.9 g/dL    Hematocrit 37.0 34.0 - 46.6 %    MCV 86.7 79.0 - 97.0 fL    MCH 28.6 26.6 - 33.0 pg    MCHC 33.0 31.5 - 35.7 g/dL    RDW 13.7 12.3 - 15.4 %    RDW-SD 42.8 37.0 - 54.0 fl    MPV 9.5 6.0 - 12.0 fL    Platelets 243 140 - 450 10*3/mm3    Neutrophil % 69.6 42.7 - 76.0 %    Lymphocyte % 22.9 19.6 - 45.3 %    Monocyte % 7.0 5.0 - 12.0 %    Eosinophil % 0.2 (L) 0.3 - 6.2 %    Basophil % 0.1 0.0 - 1.5 %    Immature Grans % 0.2 0.0 - 0.5 %    Neutrophils, Absolute 6.00 1.70 - 7.00 10*3/mm3    Lymphocytes, Absolute 1.97 0.70 - 3.10 10*3/mm3    Monocytes, Absolute 0.60 0.10 - 0.90 10*3/mm3    Eosinophils, Absolute 0.02 0.00 - 0.40 10*3/mm3    Basophils, Absolute 0.01 0.00 - 0.20 10*3/mm3    Immature Grans, Absolute 0.02 0.00 - 0.05 10*3/mm3    nRBC 0.0 0.0 - 0.2 /100 WBC   Urinalysis With Microscopic If Indicated (No Culture) - Urine, Clean Catch    Collection Time: 07/09/24  5:33 PM    Specimen: Urine, Clean Catch   Result Value Ref Range    Color, UA Yellow Yellow, Straw    Appearance, UA Turbid (A) Clear    pH, UA 7.5 5.0 - 8.0    Specific Gravity, UA 1.023 1.005 - 1.030    Glucose, UA Negative Negative    Ketones, UA 80 mg/dL (3+) (A) Negative    Bilirubin, UA Negative Negative    Blood, UA Negative Negative    Protein, UA Negative Negative    Leuk Esterase, UA Small (1+) (A) Negative    Nitrite, UA  Negative Negative    Urobilinogen, UA 1.0 E.U./dL 0.2 - 1.0 E.U./dL   Urinalysis, Microscopic Only - Urine, Clean Catch    Collection Time: 07/09/24  5:33 PM    Specimen: Urine, Clean Catch   Result Value Ref Range    RBC, UA 0-2 None Seen, 0-2 /HPF    WBC, UA 11-20 (A) None Seen, 0-2 /HPF    Bacteria, UA None Seen None Seen /HPF    Squamous Epithelial Cells, UA 7-12 (A) None Seen, 0-2 /HPF    Hyaline Casts, UA 0-2 None Seen /LPF    Methodology Automated Microscopy        Radiology  No Radiology Exams Resulted Within Past 24 Hours    Medical Decision Making:  ED Course as of 07/09/24 2311 Tue Jul 09, 2024   1603 WBC: 8.62 [DC]   1603 Hemoglobin: 12.2 [DC]   1621 Creatinine(!): 0.50 [DC]   1621 Sodium(!): 135 [DC]   1621 Potassium: 4.1 [DC]   1621 Glucose: 84 [DC]   1747 Protein, UA: Negative [DC]   1747 Bacteria, UA: None Seen [DC]   1756 Patient reevaluated and reports feeling improved.  Will discharge home with prescription for doxylamine-pyridoxine and recommended close follow-up with OB/GYN.  Return precautions discussed. [DC]      ED Course User Index  [DC] Rhea Brennan PA       MDM: Patient presenting with nausea and vomiting in the first trimester pregnancy.  Will evaluate for electrolyte disturbances and dehydration while IV hydrating patient.  Will also check UA to rule out urinary tract infection or Samm.    Procedures:  Procedures        PPE: I followed hospital protocols for proper PPE based on patient presentation including use of N95 mask for suspected infectious respiratory conditions.  Proper hand hygiene was performed both before and after the patient encounter.          Diagnosis  Final diagnoses:   Nausea and vomiting in pregnancy       Note Disclaimer: At [facility], we believe that sharing information builds trust and better relationships. You are receiving this note because you recently visited [facility]. It is possible you will see health information before a provider has talked  with you about it. This kind of information can be easy to misunderstand. To help you fully understand what it means for your health, we urge you to discuss this note with your provider.       Liz Mckinney MD  07/11/24 0021

## 2024-07-09 NOTE — ED NOTES
Patient to ER via car from home for n/v while pregnant  States nothing will stay down x 2 days    Unknown weeks

## 2024-07-11 NOTE — PROGRESS NOTES
"Subjective     History of Present Illness  Paulette Martinez is a 23 y.o.  female is being seen today for   Chief Complaint   Patient presents with    Follow-up     Gyn f/u pregnancy confirmation & u/s- last pap , pt 8wks pt feeling better from er     Patient here today for pregnancy confirmation. LMP 2024.  TVUS today shows single viable IUP at 8w3d.   Taking prenatal vitamins. Bonjesta at night for nausea/vomiting, doing well with this. Was taking zoloft and lamictal for anxiety and depression, she stopped these medication when she found out she was pregnant. States she has felt more angry lately. Denies SI or self-harm. Sees behavioral health twice a month, counseling/therapy. States she would like to continue medication if approved.   Agreeable to carrier and genetic testing at next appointment.  Would like to know gender.  Pap utd, normal 3/26/2024.    Relevant data reviewed:  US Ob Transvaginal (2024 10:48)   IGP, Rfx Aptima HPV ASCU (2024 15:39)   Hemoglobinopathy Fractionation Cascade (2022 15:01)     The following portions of the patient's history were reviewed and updated as appropriate: allergies, current medications, past family history, past medical history, past social history, past surgical history and problem list.    /64   Ht 160 cm (63\")   Wt 66 kg (145 lb 9.6 oz)   LMP 2024 (Exact Date)   BMI 25.79 kg/m²         Review of Systems   Constitutional: Negative.    HENT: Negative.     Eyes: Negative.    Respiratory: Negative.     Cardiovascular: Negative.    Gastrointestinal: Negative.    Endocrine: Negative.    Genitourinary: Negative.    Musculoskeletal: Negative.    Skin: Negative.    Allergic/Immunologic: Negative.    Neurological: Negative.    Hematological: Negative.    Psychiatric/Behavioral: Negative.         Objective   Physical Exam  Constitutional:       General: She is not in acute distress.  Cardiovascular:      Rate and Rhythm: Normal rate " and regular rhythm.      Pulses: Normal pulses.      Heart sounds: Normal heart sounds.   Pulmonary:      Effort: Pulmonary effort is normal.      Breath sounds: Normal breath sounds.   Neurological:      Mental Status: She is alert and oriented to person, place, and time.   Psychiatric:         Mood and Affect: Mood normal.         Behavior: Behavior normal.         Thought Content: Thought content normal.         Judgment: Judgment normal.           Assessment & Plan   Diagnoses and all orders for this visit:    1. 8 weeks gestation of pregnancy (Primary)  -     Drug Profile Urine - 9 Drugs - Urine, Clean Catch  -     ABO / Rh  -     Hepatitis B Surface Antigen  -     Hepatitis C Antibody  -     Antibody Screen  -     CBC & Differential  -     Urine Culture - Urine, Urine, Clean Catch  -     Rubella Antibody, IgG  -     Varicella Zoster Antibody, IgG  -     HIV-1 / O / 2 Ag / Antibody  -     Hemoglobin A1c  -     Chlamydia trachomatis, Neisseria gonorrhoeae, Trichomonas vaginalis, PCR - Urine, Urine, Clean Catch  -     Treponema pallidum AB w/Reflex RPR    2. Anxiety and depression  -     sertraline (Zoloft) 50 MG tablet; Take 1 tablet by mouth Daily.  Dispense: 30 tablet; Refill: 2  -     lamoTRIgine (LaMICtal) 100 MG tablet; Take 1 tablet by mouth Daily.  Dispense: 30 tablet; Refill: 2    New OB labs completed today - will call pt with results   Discussed zoloft and lamictal with Dr. Metz, who gave approval for patient to continue taking both medication.  Refills sent to pharmacy.   Informed patient if she has any suicidal ideation or self-harm thoughts to call 911.  Early pregnancy counseling provided and New OB folder given  Patient is taking Prenatal vitamins  Problem list reviewed and updated  Reviewed routine prenatal care with the office to include but not limited to expected weight gain during pregnancy, Tylenol products are fine, avoid aspirin and ibuprofen; not to change cat litter; food restrictions;  avoidance of alcohol, tobacco, drugs and saunas/hot tubs. Discussed that the COVID and Flu vaccine is safe and recommended in pregnancy.   SAB warnings reviewed    All questions answered. Patient verbalizes understanding and is agreeable to plan.  Return in about 1 month (around 8/12/2024) for OB intake with Dr. Metz.

## 2024-07-12 ENCOUNTER — OFFICE VISIT (OUTPATIENT)
Dept: OBSTETRICS AND GYNECOLOGY | Age: 24
End: 2024-07-12
Payer: COMMERCIAL

## 2024-07-12 VITALS
WEIGHT: 145.6 LBS | BODY MASS INDEX: 25.8 KG/M2 | DIASTOLIC BLOOD PRESSURE: 64 MMHG | SYSTOLIC BLOOD PRESSURE: 110 MMHG | HEIGHT: 63 IN

## 2024-07-12 DIAGNOSIS — F32.A ANXIETY AND DEPRESSION: ICD-10-CM

## 2024-07-12 DIAGNOSIS — F41.9 ANXIETY AND DEPRESSION: ICD-10-CM

## 2024-07-12 DIAGNOSIS — Z3A.08 8 WEEKS GESTATION OF PREGNANCY: Primary | ICD-10-CM

## 2024-07-12 RX ORDER — LAMOTRIGINE 100 MG/1
100 TABLET ORAL DAILY
Qty: 30 TABLET | Refills: 2 | Status: SHIPPED | OUTPATIENT
Start: 2024-07-12

## 2024-07-12 RX ORDER — PRENATAL WITH FERROUS FUM AND FOLIC ACID 3080; 920; 120; 400; 22; 1.84; 3; 20; 10; 1; 12; 200; 27; 25; 2 [IU]/1; [IU]/1; MG/1; [IU]/1; MG/1; MG/1; MG/1; MG/1; MG/1; MG/1; UG/1; MG/1; MG/1; MG/1; MG/1
1 TABLET ORAL DAILY
COMMUNITY
Start: 2024-06-28 | End: 2025-06-28

## 2024-07-15 LAB
C TRACH RRNA SPEC QL NAA+PROBE: NEGATIVE
N GONORRHOEA RRNA SPEC QL NAA+PROBE: NEGATIVE
T VAGINALIS RRNA SPEC QL NAA+PROBE: NEGATIVE

## 2024-07-16 LAB
ABO GROUP BLD: NORMAL
AMPHETAMINES UR QL SCN: NEGATIVE NG/ML
BACTERIA UR CULT: NORMAL
BACTERIA UR CULT: NORMAL
BARBITURATES UR QL SCN: NEGATIVE NG/ML
BASOPHILS # BLD AUTO: 0 X10E3/UL (ref 0–0.2)
BASOPHILS NFR BLD AUTO: 0 %
BENZODIAZ UR QL: NEGATIVE NG/ML
BLD GP AB SCN SERPL QL: NEGATIVE
BZE UR QL: NEGATIVE NG/ML
CARBOXYTHC UR QL CFM: POSITIVE
EOSINOPHIL # BLD AUTO: 0 X10E3/UL (ref 0–0.4)
EOSINOPHIL NFR BLD AUTO: 0 %
ERYTHROCYTE [DISTWIDTH] IN BLOOD BY AUTOMATED COUNT: 13.6 % (ref 11.7–15.4)
HBA1C MFR BLD: 5.4 % (ref 4.8–5.6)
HBV SURFACE AG SERPL QL IA: NEGATIVE
HCT VFR BLD AUTO: 37.3 % (ref 34–46.6)
HCV IGG SERPL QL IA: NON REACTIVE
HGB BLD-MCNC: 12.6 G/DL (ref 11.1–15.9)
HIV 1+2 AB+HIV1 P24 AG SERPL QL IA: NON REACTIVE
IMM GRANULOCYTES # BLD AUTO: 0 X10E3/UL (ref 0–0.1)
IMM GRANULOCYTES NFR BLD AUTO: 0 %
LYMPHOCYTES # BLD AUTO: 2.1 X10E3/UL (ref 0.7–3.1)
LYMPHOCYTES NFR BLD AUTO: 25 %
MCH RBC QN AUTO: 29.5 PG (ref 26.6–33)
MCHC RBC AUTO-ENTMCNC: 33.8 G/DL (ref 31.5–35.7)
MCV RBC AUTO: 87 FL (ref 79–97)
METHADONE UR QL SCN: NEGATIVE NG/ML
MONOCYTES # BLD AUTO: 0.6 X10E3/UL (ref 0.1–0.9)
MONOCYTES NFR BLD AUTO: 7 %
NEUTROPHILS # BLD AUTO: 5.4 X10E3/UL (ref 1.4–7)
NEUTROPHILS NFR BLD AUTO: 68 %
OPIATES UR QL: NEGATIVE NG/ML
PCP UR QL SCN: NEGATIVE NG/ML
PLATELET # BLD AUTO: 255 X10E3/UL (ref 150–450)
PROPOXYPH UR QL SCN: NEGATIVE NG/ML
RBC # BLD AUTO: 4.27 X10E6/UL (ref 3.77–5.28)
RH BLD: NEGATIVE
RUBV IGG SERPL IA-ACNC: 14.1 INDEX
TREPONEMA PALLIDUM IGG+IGM AB [PRESENCE] IN SERUM OR PLASMA BY IMMUNOASSAY: NON REACTIVE
VZV IGG SER IA-ACNC: 681 INDEX
WBC # BLD AUTO: 8.2 X10E3/UL (ref 3.4–10.8)

## 2024-07-17 ENCOUNTER — PATIENT MESSAGE (OUTPATIENT)
Dept: OBSTETRICS AND GYNECOLOGY | Age: 24
End: 2024-07-17
Payer: COMMERCIAL

## 2024-07-17 ENCOUNTER — TELEPHONE (OUTPATIENT)
Dept: OBSTETRICS AND GYNECOLOGY | Age: 24
End: 2024-07-17
Payer: COMMERCIAL

## 2024-07-17 DIAGNOSIS — O21.9 NAUSEA AND VOMITING DURING PREGNANCY: Primary | ICD-10-CM

## 2024-07-17 DIAGNOSIS — O21.9 NAUSEA AND VOMITING DURING PREGNANCY: ICD-10-CM

## 2024-07-17 RX ORDER — ONDANSETRON 4 MG/1
4 TABLET, FILM COATED ORAL EVERY 6 HOURS PRN
Qty: 30 TABLET | Refills: 1 | Status: SHIPPED | OUTPATIENT
Start: 2024-07-17 | End: 2025-07-17

## 2024-07-17 RX ORDER — ONDANSETRON 4 MG/1
4 TABLET, FILM COATED ORAL EVERY 6 HOURS PRN
Qty: 30 TABLET | Refills: 1 | Status: SHIPPED | OUTPATIENT
Start: 2024-07-17 | End: 2024-07-17 | Stop reason: SDUPTHER

## 2024-07-17 RX ORDER — ONDANSETRON 4 MG/1
4 TABLET, ORALLY DISINTEGRATING ORAL EVERY 8 HOURS PRN
Status: CANCELLED | OUTPATIENT
Start: 2024-07-17

## 2024-07-17 NOTE — TELEPHONE ENCOUNTER
----- Message from Manju LOVE sent at 7/17/2024  9:02 AM EDT -----  Regarding: FW: morning sickness  Contact: [phone]        ----- Message -----  From: Paulette Martinez  Sent: 7/17/2024   8:51 AM EDT  To: [facility]  Subject: morning sickness                                 i mean zofran

## 2024-07-17 NOTE — TELEPHONE ENCOUNTER
----- Message from Manju LOVE sent at 7/17/2024  9:02 AM EDT -----  Regarding: FW: morning sickness  Contact: [phone]        ----- Message -----  From: Paulette Martinez  Sent: 7/17/2024   8:51 AM EDT  To: [facility]  Subject: morning sickness                                 i mean zofran

## 2024-07-17 NOTE — TELEPHONE ENCOUNTER
Pt called back stating Rx was sent to [facility] pharmacy but needed to be sent to [facility] instead. Rx resent to correct pharmacy for pt.

## 2024-07-17 NOTE — TELEPHONE ENCOUNTER
----- Message from Manju LOVE sent at 7/17/2024  9:02 AM EDT -----  Regarding: FW: morning sickness  Contact: [phone]        ----- Message -----  From: Paulette Martinez  Sent: 7/17/2024   8:51 AM EDT  To: [facility]  Subject: morning sickness                                 i mean zofran

## 2024-07-24 PROBLEM — Z67.91 RH NEGATIVE STATUS DURING PREGNANCY IN FIRST TRIMESTER: Status: ACTIVE | Noted: 2024-07-24

## 2024-07-24 PROBLEM — O26.891 RH NEGATIVE STATUS DURING PREGNANCY IN FIRST TRIMESTER: Status: ACTIVE | Noted: 2024-07-24

## 2024-07-29 ENCOUNTER — TELEPHONE (OUTPATIENT)
Dept: PSYCHIATRY | Facility: CLINIC | Age: 24
End: 2024-07-29

## 2024-07-29 NOTE — TELEPHONE ENCOUNTER
"Pt arrived to her psychiatry appointment, however, pt's connection was very poor. It would connect for a few seconds up to two minutes and then turn off. This APRN's screen would read \"waiting for others to connect.\" This occurred multiple times. When pt was connected, the screen would be black and there was no audio. Two of those times, pt's frozen picture would appear and this APRN could tell pt was trying to say something. However, it only came across as noise rather than words. 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 After at least ten minutes of poor connection, this APRN asked staff pt to call and reschedule for a time she would be off work and have a better connection. This APRN needs to be able to see and hear pt clearly to make the best medication recommendations for her. Pt was rescheduled for the following week at 4:30 PM after she gets off work.  "

## 2024-08-01 ENCOUNTER — TELEPHONE (OUTPATIENT)
Dept: OBSTETRICS AND GYNECOLOGY | Age: 24
End: 2024-08-01
Payer: COMMERCIAL

## 2024-08-01 DIAGNOSIS — O21.9 NAUSEA AND VOMITING DURING PREGNANCY: ICD-10-CM

## 2024-08-01 NOTE — TELEPHONE ENCOUNTER
11w2d    Appt 8/23/24 Dr Metz    Pt is requesting a refill on Zofran. Last refill was on 7/17/24. Please advise

## 2024-08-06 ENCOUNTER — TELEPHONE (OUTPATIENT)
Dept: PSYCHIATRY | Facility: CLINIC | Age: 24
End: 2024-08-06

## 2024-08-06 NOTE — TELEPHONE ENCOUNTER
Called patient to reschedule appointment from today 08/06/24. Patient stated she does not want to reschedule due to this have been a few times our office has rescheduled her appointment. Patient stated she  would like to have a referral for in person services.   Please advise

## 2024-08-07 ENCOUNTER — TELEPHONE (OUTPATIENT)
Dept: PSYCHIATRY | Facility: CLINIC | Age: 24
End: 2024-08-07
Payer: COMMERCIAL

## 2024-08-07 DIAGNOSIS — F39 UNSPECIFIED MOOD (AFFECTIVE) DISORDER: ICD-10-CM

## 2024-08-07 DIAGNOSIS — F41.1 GENERALIZED ANXIETY DISORDER: Primary | ICD-10-CM

## 2024-08-19 RX ORDER — ONDANSETRON HYDROCHLORIDE 8 MG/1
TABLET, FILM COATED ORAL
Qty: 15 TABLET | Refills: 1 | Status: SHIPPED | OUTPATIENT
Start: 2024-08-19 | End: 2024-08-23

## 2024-08-19 RX ORDER — VALACYCLOVIR HYDROCHLORIDE 1 G/1
1000 TABLET, FILM COATED ORAL 2 TIMES DAILY
Qty: 6 TABLET | Refills: 1 | Status: SHIPPED | OUTPATIENT
Start: 2024-08-19 | End: 2024-08-22

## 2024-08-22 ENCOUNTER — REFERRAL TRIAGE (OUTPATIENT)
Dept: LABOR AND DELIVERY | Facility: HOSPITAL | Age: 24
End: 2024-08-22
Payer: COMMERCIAL

## 2024-08-23 ENCOUNTER — INITIAL PRENATAL (OUTPATIENT)
Dept: OBSTETRICS AND GYNECOLOGY | Age: 24
End: 2024-08-23
Payer: COMMERCIAL

## 2024-08-23 VITALS — DIASTOLIC BLOOD PRESSURE: 64 MMHG | WEIGHT: 143 LBS | SYSTOLIC BLOOD PRESSURE: 104 MMHG | BODY MASS INDEX: 25.33 KG/M2

## 2024-08-23 DIAGNOSIS — O26.899 RH NEGATIVE, ANTEPARTUM: ICD-10-CM

## 2024-08-23 DIAGNOSIS — N89.8 VAGINAL DISCHARGE DURING PREGNANCY IN SECOND TRIMESTER: ICD-10-CM

## 2024-08-23 DIAGNOSIS — Z67.91 RH NEGATIVE, ANTEPARTUM: ICD-10-CM

## 2024-08-23 DIAGNOSIS — O26.892 VAGINAL DISCHARGE DURING PREGNANCY IN SECOND TRIMESTER: ICD-10-CM

## 2024-08-23 DIAGNOSIS — Z13.89 SCREENING FOR BLOOD OR PROTEIN IN URINE: ICD-10-CM

## 2024-08-23 DIAGNOSIS — Z34.81 ENCOUNTER FOR SUPERVISION OF OTHER NORMAL PREGNANCY, FIRST TRIMESTER: Primary | ICD-10-CM

## 2024-08-23 PROBLEM — Z34.90 PREGNANCY: Status: ACTIVE | Noted: 2024-08-23

## 2024-08-23 PROBLEM — S82.892D CLOSED LEFT ANKLE FRACTURE, WITH ROUTINE HEALING, SUBSEQUENT ENCOUNTER: Status: RESOLVED | Noted: 2019-08-20 | Resolved: 2024-08-23

## 2024-08-23 PROBLEM — O09.299 HISTORY OF SHOULDER DYSTOCIA IN PRIOR PREGNANCY, CURRENTLY PREGNANT: Status: ACTIVE | Noted: 2024-08-23

## 2024-08-23 LAB
BILIRUB BLD-MCNC: NEGATIVE MG/DL
GLUCOSE UR STRIP-MCNC: NEGATIVE MG/DL
KETONES UR QL: NEGATIVE
LEUKOCYTE EST, POC: ABNORMAL
NITRITE UR-MCNC: NEGATIVE MG/ML
PH UR: 6 [PH] (ref 5–8)
PROT UR STRIP-MCNC: NEGATIVE MG/DL
RBC # UR STRIP: NEGATIVE /UL
SP GR UR: 1 (ref 1–1.03)
UROBILINOGEN UR QL: NORMAL

## 2024-08-23 NOTE — PROGRESS NOTES
Chief Complaint   Patient presents with    Routine Prenatal Visit     Cc: ob intake , nausea controlled with zofran , no other problems today , will do NIPT today , carrier screening  neg .       HPI: 23 y.o.  at 14w3d presents for OB intake    Last OB US growth (since 2024)       None          Vitals:    24 1431   BP: 104/64   Weight: 64.9 kg (143 lb)     Total weight gain for pregnancy:  -0.907 kg (-2 lb)    Review of systems:   Gen: negative  CV:     negative  GI: negative  :   negative  MS:    negative  Neuro: negative  Pul: negative    Physical Exam  Vitals and nursing note reviewed.   Constitutional:       Appearance: Normal appearance.   Pulmonary:      Effort: Pulmonary effort is normal.   Genitourinary:     General: Normal vulva.      Exam position: Lithotomy position.      Labia:         Right: No rash or lesion.         Left: No rash or lesion.       Vagina: Vaginal discharge present.      Cervix: No friability or lesion.   Neurological:      Mental Status: She is alert.   Psychiatric:         Mood and Affect: Mood normal.         Thought Content: Thought content normal.         Judgment: Judgment normal.           A/P  1. Intrauterine pregnancy at 14w3d   2. Pregnancy Risk:  NORMAL    Diagnoses and all orders for this visit:    1. Encounter for supervision of other normal pregnancy, first trimester (Primary)  -     Brittany Morris Prenatal Test: Chromosomes 13, 18, 21, X & Y: Triploidy 22Q.11.2 Deletion - Blood,    2. Screening for blood or protein in urine  -     POC Urinalysis Dipstick    3. Vaginal discharge during pregnancy in second trimester  -     NuSwab VG+ - Swab, Vagina    4. Rh negative, antepartum  Overview:  ABO / Rh (2024 12:07) Blood type: A negative          Nutrition and weight gain were addressed.  -----------------------  PLAN:   Return in about 5 weeks (around 2024), or ob check and anatomy US.  OB intake completed  Prenatal labs reviewed  Desires  NIPT  Vaginal discharge-completed  Counseling was given to patient for the following topics: instructions for management, impressions, and importance of treatment compliance . Total time of the encounter was 30 minutes and 25 minutes was spent counseling.    Cristy Metz MD  8/23/2024 15:26 EDT

## 2024-08-26 LAB
A VAGINAE DNA VAG QL NAA+PROBE: NORMAL SCORE
BVAB2 DNA VAG QL NAA+PROBE: NORMAL SCORE
C ALBICANS DNA VAG QL NAA+PROBE: NEGATIVE
C GLABRATA DNA VAG QL NAA+PROBE: NEGATIVE
C TRACH DNA SPEC QL NAA+PROBE: NEGATIVE
MEGA1 DNA VAG QL NAA+PROBE: NORMAL SCORE
N GONORRHOEA DNA VAG QL NAA+PROBE: NEGATIVE
T VAGINALIS DNA VAG QL NAA+PROBE: NEGATIVE

## 2024-09-20 ENCOUNTER — PATIENT OUTREACH (OUTPATIENT)
Dept: LABOR AND DELIVERY | Facility: HOSPITAL | Age: 24
End: 2024-09-20
Payer: COMMERCIAL

## 2024-09-27 ENCOUNTER — ROUTINE PRENATAL (OUTPATIENT)
Dept: OBSTETRICS AND GYNECOLOGY | Age: 24
End: 2024-09-27
Payer: COMMERCIAL

## 2024-09-27 VITALS — SYSTOLIC BLOOD PRESSURE: 104 MMHG | DIASTOLIC BLOOD PRESSURE: 66 MMHG | BODY MASS INDEX: 25.86 KG/M2 | WEIGHT: 146 LBS

## 2024-09-27 DIAGNOSIS — Z34.82 PRENATAL CARE, SUBSEQUENT PREGNANCY IN SECOND TRIMESTER: Primary | ICD-10-CM

## 2024-09-27 DIAGNOSIS — Z67.91 RH NEGATIVE, ANTEPARTUM: ICD-10-CM

## 2024-09-27 DIAGNOSIS — O26.899 RH NEGATIVE, ANTEPARTUM: ICD-10-CM

## 2024-09-27 DIAGNOSIS — O09.299 HISTORY OF SHOULDER DYSTOCIA IN PRIOR PREGNANCY, CURRENTLY PREGNANT: ICD-10-CM

## 2024-09-27 DIAGNOSIS — Z13.89 SCREENING FOR HEMATURIA OR PROTEINURIA: ICD-10-CM

## 2024-09-27 LAB
BILIRUB BLD-MCNC: NEGATIVE MG/DL
GLUCOSE UR STRIP-MCNC: NEGATIVE MG/DL
KETONES UR QL: NEGATIVE
LEUKOCYTE EST, POC: ABNORMAL
NITRITE UR-MCNC: NEGATIVE MG/ML
PH UR: 6.5 [PH] (ref 5–8)
PROT UR STRIP-MCNC: NEGATIVE MG/DL
RBC # UR STRIP: NEGATIVE /UL
SP GR UR: 1.03 (ref 1–1.03)
UROBILINOGEN UR QL: ABNORMAL

## 2024-10-15 ENCOUNTER — PATIENT MESSAGE (OUTPATIENT)
Dept: OBSTETRICS AND GYNECOLOGY | Age: 24
End: 2024-10-15
Payer: COMMERCIAL

## 2024-10-15 ENCOUNTER — TELEPHONE (OUTPATIENT)
Dept: OBSTETRICS AND GYNECOLOGY | Age: 24
End: 2024-10-15
Payer: COMMERCIAL

## 2024-10-15 DIAGNOSIS — O21.9 NAUSEA AND VOMITING DURING PREGNANCY: ICD-10-CM

## 2024-10-17 RX ORDER — ONDANSETRON 4 MG/1
4 TABLET, FILM COATED ORAL EVERY 6 HOURS PRN
Qty: 30 TABLET | Refills: 1 | Status: SHIPPED | OUTPATIENT
Start: 2024-10-17 | End: 2025-10-17

## 2024-10-25 ENCOUNTER — ROUTINE PRENATAL (OUTPATIENT)
Dept: OBSTETRICS AND GYNECOLOGY | Age: 24
End: 2024-10-25
Payer: COMMERCIAL

## 2024-10-25 VITALS — WEIGHT: 152 LBS | DIASTOLIC BLOOD PRESSURE: 68 MMHG | BODY MASS INDEX: 26.93 KG/M2 | SYSTOLIC BLOOD PRESSURE: 112 MMHG

## 2024-10-25 DIAGNOSIS — Z67.91 RH NEGATIVE, ANTEPARTUM: ICD-10-CM

## 2024-10-25 DIAGNOSIS — O26.899 RH NEGATIVE, ANTEPARTUM: ICD-10-CM

## 2024-10-25 DIAGNOSIS — O09.299 HISTORY OF SHOULDER DYSTOCIA IN PRIOR PREGNANCY, CURRENTLY PREGNANT: ICD-10-CM

## 2024-10-25 DIAGNOSIS — Z13.89 SCREENING FOR BLOOD OR PROTEIN IN URINE: ICD-10-CM

## 2024-10-25 DIAGNOSIS — Z34.82 PRENATAL CARE, SUBSEQUENT PREGNANCY IN SECOND TRIMESTER: Primary | ICD-10-CM

## 2024-10-25 LAB
BILIRUB BLD-MCNC: NEGATIVE MG/DL
GLUCOSE UR STRIP-MCNC: NEGATIVE MG/DL
KETONES UR QL: NEGATIVE
LEUKOCYTE EST, POC: NEGATIVE
NITRITE UR-MCNC: NEGATIVE MG/ML
PH UR: 6 [PH] (ref 5–8)
PROT UR STRIP-MCNC: NEGATIVE MG/DL
RBC # UR STRIP: NEGATIVE /UL
SP GR UR: 1.03 (ref 1–1.03)
UROBILINOGEN UR QL: NORMAL

## 2024-10-25 NOTE — PROGRESS NOTES
Chief Complaint   Patient presents with    Routine Prenatal Visit     Cc: ob check , upd with flu vaccine , pt reports doing well no ob complaints          HPI: 24 y.o.  at 23w3d presents  for prenatal care    Last OB US growth (since 2024)         Value Time User    FETAL SURVEY  NL/Complete 2024  8:55 AM Cristy Metz MD          Vitals:    10/25/24 0836   BP: 112/68   Weight: 68.9 kg (152 lb)     Total weight gain for pregnancy:  3.175 kg (7 lb)    Review of systems:   Gen: negative  CV:     negative  GI: negative  :   negative and good fetal movement noted   MS:    negative  Neuro: negative  Pul: negative    Physical Exam  Vitals and nursing note reviewed.   Constitutional:       Appearance: Normal appearance.   Pulmonary:      Effort: Pulmonary effort is normal.   Neurological:      Mental Status: She is alert.   Psychiatric:         Mood and Affect: Mood normal.         Thought Content: Thought content normal.         Judgment: Judgment normal.           A/P  1. Intrauterine pregnancy at 23w3d   2. Pregnancy Risk:  HIGH RISK    Diagnoses and all orders for this visit:    1. Prenatal care, subsequent pregnancy in second trimester (Primary)    2. Screening for blood or protein in urine  -     POC Urinalysis Dipstick    3. History of shoulder dystocia in prior pregnancy, currently pregnant    4. Rh negative, antepartum  Overview:  ABO / Rh (2024 12:07) Blood type: A negative          Nutrition and weight gain were addressed.  -----------------------  PLAN:   Return in about 1 month (around 2024), or ob check /one-hour gtt /growth US.      Cristy Metz MD  10/25/2024 09:00 EDT

## 2024-11-18 ENCOUNTER — PATIENT OUTREACH (OUTPATIENT)
Dept: LABOR AND DELIVERY | Facility: HOSPITAL | Age: 24
End: 2024-11-18
Payer: COMMERCIAL

## 2024-11-18 NOTE — OUTREACH NOTE
[facility]  Check-In    Current Estimated Gestational Age: 26w6d      Questions/Answers      Flowsheet Row Responses   Best Method for Contacting Cell   Demographics Reviewed Yes   Currently Employed Yes   Able to keep appointments as scheduled Yes   Gender(s) and Name(s) girl   Baby Active/Feeling Fetal Movemen Yes   How are you presently feeling? good   Resource/Environmental Concerns None   Do you have any questions related to your care experience, your pregnancy, plans for delivery, any concerns, etc? No   Other Education Insurance benefits/Incentives, Transportation Assistance          Pt reports she is doing well. Reviewed fetal kick counts and she reports active fetal movement. She will order her breast pump in the next few weeks. She has in person therapy scheduled that was cancelled by the office, reviewed other options. Transportation has been ok, encouraged her to call if she needs ride to any appts. F/u again around 35 weeks.     Stephany Coyle RN  Maternity Nurse Navigator    11/18/2024, 13:13 EST

## 2024-11-25 ENCOUNTER — ROUTINE PRENATAL (OUTPATIENT)
Dept: OBSTETRICS AND GYNECOLOGY | Age: 24
End: 2024-11-25
Payer: COMMERCIAL

## 2024-11-25 VITALS — SYSTOLIC BLOOD PRESSURE: 110 MMHG | WEIGHT: 162 LBS | BODY MASS INDEX: 28.7 KG/M2 | DIASTOLIC BLOOD PRESSURE: 70 MMHG

## 2024-11-25 DIAGNOSIS — Z34.82 PRENATAL CARE, SUBSEQUENT PREGNANCY IN SECOND TRIMESTER: Primary | ICD-10-CM

## 2024-11-25 DIAGNOSIS — Z67.91 RH NEGATIVE, ANTEPARTUM: ICD-10-CM

## 2024-11-25 DIAGNOSIS — Z13.1 SCREENING FOR DIABETES MELLITUS: ICD-10-CM

## 2024-11-25 DIAGNOSIS — Z23 ENCOUNTER FOR ADMINISTRATION OF VACCINE: ICD-10-CM

## 2024-11-25 DIAGNOSIS — Z13.89 SCREENING FOR BLOOD OR PROTEIN IN URINE: ICD-10-CM

## 2024-11-25 DIAGNOSIS — Z13.0 SCREENING FOR IRON DEFICIENCY ANEMIA: ICD-10-CM

## 2024-11-25 DIAGNOSIS — O26.899 RH NEGATIVE, ANTEPARTUM: ICD-10-CM

## 2024-11-25 DIAGNOSIS — O09.299 HISTORY OF SHOULDER DYSTOCIA IN PRIOR PREGNANCY, CURRENTLY PREGNANT: ICD-10-CM

## 2024-11-25 NOTE — PROGRESS NOTES
Chief Complaint   Patient presents with    Routine Prenatal Visit     Cc: ob check with growth U/S and 1 hr gtt @ 943 today  / tdap and Rhogam  , no complaints        HPI: 24 y.o.  at 27w6d presents for prenatal care    Last OB US Data (since 2024)         Value Time User    EFW%ILE  24%ile 2024  9:01 AM Cristy Metz MD    AC%ILE  24%ile 2024  9:01 AM Cristy Metz MD    Fetal Survey  NL/Complete 2024  8:55 AM Cristy Metz MD    Placenta location  Posterior 2024  9:01 AM Cristy Metz MD          Vitals:    24 0849   BP: 110/70   Weight: 73.5 kg (162 lb)     Total weight gain for pregnancy:  7.711 kg (17 lb)    Review of systems:   Gen: negative  CV:     negative  GI: negative  :   negative and good fetal movement noted   MS:    negative  Neuro: negative  Pul: negative    Physical Exam      A/P  1. Intrauterine pregnancy at 27w6d   2. Pregnancy Risk:  HIGH RISK    Diagnoses and all orders for this visit:    1. Prenatal care, subsequent pregnancy in second trimester (Primary)    2. Screening for iron deficiency anemia  -     Gestational Screen 1 Hr ([facility])  -     Antibody Screen  -     Treponema pallidum AB w/Reflex RPR  -     CBC (No Diff)  -     POC Urinalysis Dipstick    3. Screening for diabetes mellitus  -     Gestational Screen 1 Hr ([facility])  -     Antibody Screen  -     Treponema pallidum AB w/Reflex RPR  -     CBC (No Diff)  -     POC Urinalysis Dipstick    4. Encounter for administration of vaccine  -     Gestational Screen 1 Hr ([facility])  -     Antibody Screen  -     Treponema pallidum AB w/Reflex RPR  -     CBC (No Diff)  -     POC Urinalysis Dipstick    5. Screening for blood or protein in urine  -     Gestational Screen 1 Hr ([facility])  -     Antibody Screen  -     Treponema pallidum AB w/Reflex RPR  -     CBC (No Diff)  -     POC Urinalysis Dipstick    6. History of shoulder dystocia in prior pregnancy, currently pregnant    7. Rh  negative, antepartum  Overview:  ABO / Rh (2024 12:07) Blood type: A negative      Other orders  -     Tdap Vaccine Greater Than or Equal To 8yo IM  -     Rhogam Immune Globulin Immunization         labor was discussed.  Warnings were provided.  Nutrition and weight gain were addressed.  -----------------------  PLAN:   Return in about 2 weeks (around 2024), or ob check.  Rh--RhoGAM today  Normal fetal growth today    Cristy Metz MD  2024 09:06 EST

## 2024-11-26 LAB
BLD GP AB SCN SERPL QL: NEGATIVE
ERYTHROCYTE [DISTWIDTH] IN BLOOD BY AUTOMATED COUNT: 12.5 % (ref 12.3–15.4)
GLUCOSE 1H P 50 G GLC PO SERPL-MCNC: 75 MG/DL (ref 65–139)
HCT VFR BLD AUTO: 31.2 % (ref 34–46.6)
HGB BLD-MCNC: 9.8 G/DL (ref 12–15.9)
MCH RBC QN AUTO: 27.2 PG (ref 26.6–33)
MCHC RBC AUTO-ENTMCNC: 31.4 G/DL (ref 31.5–35.7)
MCV RBC AUTO: 86.7 FL (ref 79–97)
PLATELET # BLD AUTO: 218 10*3/MM3 (ref 140–450)
RBC # BLD AUTO: 3.6 10*6/MM3 (ref 3.77–5.28)
TREPONEMA PALLIDUM IGG+IGM AB [PRESENCE] IN SERUM OR PLASMA BY IMMUNOASSAY: NON REACTIVE
WBC # BLD AUTO: 9.19 10*3/MM3 (ref 3.4–10.8)

## 2024-11-27 NOTE — PROGRESS NOTES
Call patient and notify of normal results of 1 hour glucose test but very anemic.  Is she taking an iron supplement?

## 2024-12-03 ENCOUNTER — TELEPHONE (OUTPATIENT)
Dept: OBSTETRICS AND GYNECOLOGY | Age: 24
End: 2024-12-03
Payer: COMMERCIAL

## 2024-12-03 DIAGNOSIS — O99.019 MATERNAL ANEMIA IN PREGNANCY, ANTEPARTUM: Primary | ICD-10-CM

## 2024-12-03 RX ORDER — FERROUS SULFATE 325(65) MG
325 TABLET ORAL 2 TIMES DAILY
Qty: 60 TABLET | Refills: 2 | Status: SHIPPED | OUTPATIENT
Start: 2024-12-03

## 2024-12-03 NOTE — TELEPHONE ENCOUNTER
----- Message from Kathie KOCH sent at 12/3/2024 12:53 PM EST -----  Results to pt,states she is not taking iron

## 2024-12-09 ENCOUNTER — TELEPHONE (OUTPATIENT)
Dept: OBSTETRICS AND GYNECOLOGY | Age: 24
End: 2024-12-09

## 2024-12-09 ENCOUNTER — ROUTINE PRENATAL (OUTPATIENT)
Dept: OBSTETRICS AND GYNECOLOGY | Age: 24
End: 2024-12-09
Payer: COMMERCIAL

## 2024-12-09 VITALS — SYSTOLIC BLOOD PRESSURE: 104 MMHG | WEIGHT: 164 LBS | BODY MASS INDEX: 29.05 KG/M2 | DIASTOLIC BLOOD PRESSURE: 64 MMHG

## 2024-12-09 DIAGNOSIS — O99.019 MATERNAL ANEMIA IN PREGNANCY, ANTEPARTUM: ICD-10-CM

## 2024-12-09 DIAGNOSIS — Z34.83 PRENATAL CARE, SUBSEQUENT PREGNANCY IN THIRD TRIMESTER: Primary | ICD-10-CM

## 2024-12-09 DIAGNOSIS — Z13.89 SCREENING FOR BLOOD OR PROTEIN IN URINE: ICD-10-CM

## 2024-12-09 DIAGNOSIS — O26.899 RH NEGATIVE, ANTEPARTUM: ICD-10-CM

## 2024-12-09 DIAGNOSIS — Z67.91 RH NEGATIVE, ANTEPARTUM: ICD-10-CM

## 2024-12-09 DIAGNOSIS — O09.299 HISTORY OF SHOULDER DYSTOCIA IN PRIOR PREGNANCY, CURRENTLY PREGNANT: ICD-10-CM

## 2024-12-09 PROCEDURE — 99213 OFFICE O/P EST LOW 20 MIN: CPT | Performed by: OBSTETRICS & GYNECOLOGY

## 2024-12-09 NOTE — PROGRESS NOTES
Chief Complaint   Patient presents with    Routine Prenatal Visit     Cc; ob check , iron twice a day , patient reports good FM .        HPI: 24 y.o.  at 29w6d presents for prenatal care    Last OB US Data (since 2024)         Value Time User    EFW%ILE  24%ile 2024  9:01 AM Cristy Metz MD    AC%ILE  24%ile 2024  9:01 AM Cristy Metz MD    Fetal Survey  NL/Complete 2024  8:55 AM Cristy Metz MD    Placenta location  Posterior 2024  9:01 AM Cristy Metz MD          Vitals:    24 0859   BP: 104/64   Weight: 74.4 kg (164 lb)     Total weight gain for pregnancy:  8.618 kg (19 lb)    Review of systems:   Gen: negative  CV:     negative  GI: negative  :   negative and good fetal movement noted   MS:    negative  Neuro: negative  Pul: negative    Physical Exam  Vitals and nursing note reviewed.   Constitutional:       Appearance: Normal appearance.   Pulmonary:      Effort: Pulmonary effort is normal.   Neurological:      Mental Status: She is alert.   Psychiatric:         Mood and Affect: Mood normal.         Thought Content: Thought content normal.         Judgment: Judgment normal.           A/P  1. Intrauterine pregnancy at 29w6d   2. Pregnancy Risk:  HIGH RISK    Diagnoses and all orders for this visit:    1. Prenatal care, subsequent pregnancy in third trimester (Primary)    2. Screening for blood or protein in urine  -     POC Urinalysis Dipstick    3. History of shoulder dystocia in prior pregnancy, currently pregnant    4. Rh negative, antepartum  Overview:  ABO / Rh (2024 12:07) Blood type: A negative      5. Maternal anemia in pregnancy, antepartum         labor was discussed.  Warnings were provided.  Nutrition and weight gain were addressed.  -----------------------  PLAN:   Return in about 2 weeks (around 2024), or ob check and growth US.  History of shoulder dystocia-growth next visit  Anemia-continue  iron    Cristy Metz MD  12/9/2024 09:16 EST

## 2024-12-10 ENCOUNTER — TELEPHONE (OUTPATIENT)
Dept: OBSTETRICS AND GYNECOLOGY | Age: 24
End: 2024-12-10
Payer: COMMERCIAL

## 2024-12-20 ENCOUNTER — ROUTINE PRENATAL (OUTPATIENT)
Dept: OBSTETRICS AND GYNECOLOGY | Age: 24
End: 2024-12-20
Payer: COMMERCIAL

## 2024-12-20 VITALS — SYSTOLIC BLOOD PRESSURE: 104 MMHG | BODY MASS INDEX: 29.94 KG/M2 | DIASTOLIC BLOOD PRESSURE: 68 MMHG | WEIGHT: 169 LBS

## 2024-12-20 DIAGNOSIS — O99.019 MATERNAL ANEMIA IN PREGNANCY, ANTEPARTUM: ICD-10-CM

## 2024-12-20 DIAGNOSIS — O26.899 RH NEGATIVE, ANTEPARTUM: ICD-10-CM

## 2024-12-20 DIAGNOSIS — Z13.89 SCREENING FOR HEMATURIA OR PROTEINURIA: ICD-10-CM

## 2024-12-20 DIAGNOSIS — Z34.83 PRENATAL CARE, SUBSEQUENT PREGNANCY IN THIRD TRIMESTER: Primary | ICD-10-CM

## 2024-12-20 DIAGNOSIS — O09.299 HISTORY OF SHOULDER DYSTOCIA IN PRIOR PREGNANCY, CURRENTLY PREGNANT: ICD-10-CM

## 2024-12-20 DIAGNOSIS — Z67.91 RH NEGATIVE, ANTEPARTUM: ICD-10-CM

## 2024-12-20 LAB
BILIRUB BLD-MCNC: NEGATIVE MG/DL
GLUCOSE UR STRIP-MCNC: NEGATIVE MG/DL
KETONES UR QL: NEGATIVE
LEUKOCYTE EST, POC: ABNORMAL
NITRITE UR-MCNC: NEGATIVE MG/ML
PH UR: 7.5 [PH] (ref 5–8)
PROT UR STRIP-MCNC: NEGATIVE MG/DL
RBC # UR STRIP: NEGATIVE /UL
SP GR UR: 1.02 (ref 1–1.03)
UROBILINOGEN UR QL: ABNORMAL

## 2024-12-20 NOTE — PROGRESS NOTES
Chief Complaint   Patient presents with    Routine Prenatal Visit     31w3d, anatomy scan today, pt stopped taking iron because its causing her to vomit        HPI: 24 y.o.  at 31w3d presents for prenatal care    Last OB US Data (since 6/3/2024)         Value Time User    EFW%ILE  34%ile 2024  9:23 AM Cristy Metz MD    AC%ILE  53%ile 2024  9:23 AM Cristy Metz MD    Fetal Survey  NL/Complete 2024  8:55 AM Cristy Metz MD    Placenta location  Posterior 2024  9:23 AM Cristy Metz MD          Vitals:    24 0908   BP: 104/68   Weight: 76.7 kg (169 lb)     Total weight gain for pregnancy:  10.9 kg (24 lb)    Review of systems:   Gen: negative  CV:     negative  GI: negative  :   negative and good fetal movement noted   MS:    negative  Neuro: negative  Pul: negative    Physical Exam  Vitals and nursing note reviewed.   Constitutional:       Appearance: Normal appearance.   Pulmonary:      Effort: Pulmonary effort is normal.   Neurological:      Mental Status: She is alert.   Psychiatric:         Mood and Affect: Mood normal.         Thought Content: Thought content normal.         Judgment: Judgment normal.           A/P  1. Intrauterine pregnancy at 31w3d   2. Pregnancy Risk:  HIGH RISK    Diagnoses and all orders for this visit:    1. Prenatal care, subsequent pregnancy in third trimester (Primary)    2. Screening for hematuria or proteinuria  -     POC Urinalysis Dipstick    3. History of shoulder dystocia in prior pregnancy, currently pregnant    4. Rh negative, antepartum  Overview:  ABO / Rh (2024 12:07) Blood type: A negative      5. Maternal anemia in pregnancy, antepartum         labor was discussed.  Warnings were provided.  -----------------------  PLAN:   Return in about 17 days (around 2025), or ob check.  Normal fetal growth today   Rh neg - s/p Rhogam      Cristy Metz MD  2024 09:25 EST

## 2025-01-06 ENCOUNTER — PATIENT OUTREACH (OUTPATIENT)
Dept: LABOR AND DELIVERY | Facility: HOSPITAL | Age: 25
End: 2025-01-06
Payer: COMMERCIAL

## 2025-01-06 NOTE — OUTREACH NOTE
[facility]  Check-In    Current Estimated Gestational Age: 33w6d      Questions/Answers      Flowsheet Row Responses   Best Method for Contacting Cell   Demographics Reviewed Yes   Currently Employed Yes   Able to keep appointments as scheduled Yes   Gender(s) and Name(s) girl   Baby Active/Feeling Fetal Movemen Yes   How are you presently feeling? good   Supplies ready for baby Clothing, Diapers   Resource/Environmental Concerns None   Do you have any questions related to your care experience, your pregnancy, plans for delivery, any concerns, etc? No   Other Education Insurance benefits/Incentives, [facility] [facility] [employer]          Pt doing well. She states that she feels active fetal movement frequently. She is working on infant supplies and does have diapers and clothing. She will call [employer] again about her gift card. Reviewed ordering a breast pump and sent info. Reviewed labor precautions.     Stephany Coyle RN  Maternity Nurse Navigator    1/6/2025, 10:56 EST

## 2025-01-08 ENCOUNTER — ROUTINE PRENATAL (OUTPATIENT)
Dept: OBSTETRICS AND GYNECOLOGY | Age: 25
End: 2025-01-08
Payer: COMMERCIAL

## 2025-01-08 VITALS — BODY MASS INDEX: 30.82 KG/M2 | SYSTOLIC BLOOD PRESSURE: 104 MMHG | WEIGHT: 174 LBS | DIASTOLIC BLOOD PRESSURE: 64 MMHG

## 2025-01-08 DIAGNOSIS — Z67.91 RH NEGATIVE, ANTEPARTUM: ICD-10-CM

## 2025-01-08 DIAGNOSIS — O26.899 RH NEGATIVE, ANTEPARTUM: ICD-10-CM

## 2025-01-08 DIAGNOSIS — Z13.89 SCREENING FOR BLOOD OR PROTEIN IN URINE: ICD-10-CM

## 2025-01-08 DIAGNOSIS — Z34.83 PRENATAL CARE, SUBSEQUENT PREGNANCY IN THIRD TRIMESTER: Primary | ICD-10-CM

## 2025-01-08 DIAGNOSIS — O99.019 MATERNAL ANEMIA IN PREGNANCY, ANTEPARTUM: ICD-10-CM

## 2025-01-08 DIAGNOSIS — O09.299 HISTORY OF SHOULDER DYSTOCIA IN PRIOR PREGNANCY, CURRENTLY PREGNANT: ICD-10-CM

## 2025-01-08 NOTE — PROGRESS NOTES
Chief Complaint   Patient presents with    Routine Prenatal Visit     Cc; ob check , c/o back pain exercises given to patient some belly tightness no VB or ctx , RSV next visit , reports good FM , not taking iron supplements due to upset stomach nausea and vomiting .       HPI: 24 y.o.  at 34w1d presents for prenatal care     Last OB US Data (since 2024)         Value Time User    EFW%ILE  34%ile 2024  9:23 AM Cristy Metz MD    AC%ILE  53%ile 2024  9:23 AM Cristy Metz MD    Fetal Survey  NL/Complete 2024  8:55 AM Cristy Metz MD    Placenta location  Posterior 2024  9:23 AM Cristy Metz MD          Vitals:    25 1017   BP: 104/64   Weight: 78.9 kg (174 lb)     Total weight gain for pregnancy:  13.2 kg (29 lb)    Review of systems:   Gen: negative  CV:     negative  GI: negative  :   negative and good fetal movement noted   MS:    back pain   Neuro: negative  Pul: negative    Physical Exam  Vitals and nursing note reviewed.   Constitutional:       Appearance: Normal appearance. She is normal weight.   Pulmonary:      Effort: Pulmonary effort is normal.   Neurological:      Mental Status: She is alert.   Psychiatric:         Mood and Affect: Mood normal.         Thought Content: Thought content normal.         Judgment: Judgment normal.           A/P  1. Intrauterine pregnancy at 34w1d   2. Pregnancy Risk:  HIGH RISK    Diagnoses and all orders for this visit:    1. Prenatal care, subsequent pregnancy in third trimester (Primary)    2. Screening for blood or protein in urine  -     POC Urinalysis Dipstick    3. Maternal anemia in pregnancy, antepartum    4. Rh negative, antepartum  Overview:  ABO / Rh (2024 12:07) Blood type: A negative      5. History of shoulder dystocia in prior pregnancy, currently pregnant          -----------------------  PLAN:   Return in about 12 days (around 2025), or ob check and growth US.  History of  shoulder dystocia-check growth next visit  RSV next visit  Cristy Metz MD  1/8/2025 10:38 EST

## 2025-01-20 ENCOUNTER — ROUTINE PRENATAL (OUTPATIENT)
Dept: OBSTETRICS AND GYNECOLOGY | Age: 25
End: 2025-01-20
Payer: COMMERCIAL

## 2025-01-20 VITALS — DIASTOLIC BLOOD PRESSURE: 64 MMHG | WEIGHT: 179 LBS | SYSTOLIC BLOOD PRESSURE: 102 MMHG | BODY MASS INDEX: 31.71 KG/M2

## 2025-01-20 DIAGNOSIS — Z36.85 ANTENATAL SCREENING FOR STREPTOCOCCUS B: ICD-10-CM

## 2025-01-20 DIAGNOSIS — O99.019 MATERNAL ANEMIA IN PREGNANCY, ANTEPARTUM: ICD-10-CM

## 2025-01-20 DIAGNOSIS — Z34.83 PRENATAL CARE, SUBSEQUENT PREGNANCY IN THIRD TRIMESTER: Primary | ICD-10-CM

## 2025-01-20 DIAGNOSIS — O09.299 HISTORY OF SHOULDER DYSTOCIA IN PRIOR PREGNANCY, CURRENTLY PREGNANT: ICD-10-CM

## 2025-01-20 DIAGNOSIS — Z67.91 RH NEGATIVE, ANTEPARTUM: ICD-10-CM

## 2025-01-20 DIAGNOSIS — O26.899 RH NEGATIVE, ANTEPARTUM: ICD-10-CM

## 2025-01-20 DIAGNOSIS — Z13.89 SCREENING FOR BLOOD OR PROTEIN IN URINE: ICD-10-CM

## 2025-01-20 NOTE — PROGRESS NOTES
Chief Complaint   Patient presents with    Routine Prenatal Visit     Cc: ob check with growth today , GBS today , having lots of pelvic pain , no VB , reports good FM          HPI: 24 y.o.  at 35w6d presents for prenatal care    Last OB US Data (since 2024)         Value Time User    EFW%ILE  30%ile 2025  4:10 PM Cristy Metz MD    AC%ILE  44%ile 2025  4:10 PM Cristy Metz MD    Fetal Survey  NL/Complete 2024  8:55 AM Cristy Metz MD    Placenta location  Posterior 2025  4:10 PM Cristy Metz MD          Vitals:    25 1553   BP: 102/64   Weight: 81.2 kg (179 lb)     Total weight gain for pregnancy:  15.4 kg (34 lb)    Review of systems:   Gen: negative  CV:     negative  GI: negative  :   negative and good fetal movement noted   MS:    negative  Neuro: negative  Pul: negative    Physical Exam  Vitals and nursing note reviewed.   Constitutional:       Appearance: Normal appearance.   Pulmonary:      Effort: Pulmonary effort is normal.   Neurological:      Mental Status: She is alert.   Psychiatric:         Mood and Affect: Mood normal.         Thought Content: Thought content normal.         Judgment: Judgment normal.           A/P  1. Intrauterine pregnancy at 35w6d   2. Pregnancy Risk:  HIGH RISK    Diagnoses and all orders for this visit:    1. Prenatal care, subsequent pregnancy in third trimester (Primary)    2.  screening for streptococcus B  -     Group B Streptococcus Culture - Swab, Vaginal/Rectum    3. Screening for blood or protein in urine  -     POC Urinalysis Dipstick    4. Maternal anemia in pregnancy, antepartum    5. Rh negative, antepartum  Overview:  ABO / Rh (2024 12:07) Blood type: A negative      6. History of shoulder dystocia in prior pregnancy, currently pregnant        Routine labor warnings were discussed and indications for L & D f/u including bleeding, regular contractions, decreased fetal movement  or/and rupture of membranes.   -----------------------  PLAN:   Return in about 11 days (around 2025), or ob check.  GBS collected   History of shoulder dystocia-patient declines  section-this baby is measuring smaller than her first child in the 30th percentile for weight.  I did discuss with patient and  that I cannot guarantee that having a smaller baby will prevent a shoulder dystocia.      Cristy Metz MD  2025 18:09 EST

## 2025-01-24 LAB — B-HEM STREP SPEC QL CULT: NEGATIVE

## 2025-01-31 ENCOUNTER — ROUTINE PRENATAL (OUTPATIENT)
Dept: OBSTETRICS AND GYNECOLOGY | Age: 25
End: 2025-01-31
Payer: COMMERCIAL

## 2025-01-31 VITALS — BODY MASS INDEX: 32.59 KG/M2 | SYSTOLIC BLOOD PRESSURE: 110 MMHG | DIASTOLIC BLOOD PRESSURE: 72 MMHG | WEIGHT: 184 LBS

## 2025-01-31 DIAGNOSIS — Z13.89 SCREENING FOR BLOOD OR PROTEIN IN URINE: Primary | ICD-10-CM

## 2025-01-31 DIAGNOSIS — Z67.91 RH NEGATIVE, ANTEPARTUM: ICD-10-CM

## 2025-01-31 DIAGNOSIS — O26.899 RH NEGATIVE, ANTEPARTUM: ICD-10-CM

## 2025-01-31 DIAGNOSIS — O09.299 HISTORY OF SHOULDER DYSTOCIA IN PRIOR PREGNANCY, CURRENTLY PREGNANT: ICD-10-CM

## 2025-01-31 DIAGNOSIS — O99.019 MATERNAL ANEMIA IN PREGNANCY, ANTEPARTUM: ICD-10-CM

## 2025-01-31 PROBLEM — R10.2 PELVIC PRESSURE IN PREGNANCY, THIRD TRIMESTER: Status: ACTIVE | Noted: 2025-01-31

## 2025-01-31 PROBLEM — O26.893 PELVIC PRESSURE IN PREGNANCY, THIRD TRIMESTER: Status: ACTIVE | Noted: 2025-01-31

## 2025-01-31 NOTE — PROGRESS NOTES
Chief Complaint   Patient presents with    Routine Prenatal Visit     Cc: ob check today , GBS neg , reports good FM , no ctx , denies any bleeding or fluid loss , just having pelvic pressure wants cervical check .        HPI: 24 y.o.  at 37w3d presents for prenatal care today    Last OB US Data (since 7/15/2024)         Value Time User    EFW%ILE  30%ile 2025  4:10 PM Cristy Metz MD    AC%ILE  44%ile 2025  4:10 PM Cristy Metz MD    Fetal Survey  NL/Complete 2024  8:55 AM Cristy Metz MD    Placenta location  Posterior 2025  4:10 PM Cristy Metz MD          Vitals:    25 1309   BP: 110/72   Weight: 83.5 kg (184 lb)     Total weight gain for pregnancy:  17.7 kg (39 lb)    Review of systems:   Gen: negative  CV:     negative  GI: negative  :   good fetal movement noted  and pelvic pressure  MS:    negative  Neuro: negative  Pul: negative    Physical Exam  Vitals and nursing note reviewed.   Constitutional:       Appearance: Normal appearance.   Pulmonary:      Effort: Pulmonary effort is normal.   Neurological:      Mental Status: She is alert.   Psychiatric:         Mood and Affect: Mood normal.         Thought Content: Thought content normal.         Judgment: Judgment normal.           A/P  1. Intrauterine pregnancy at 37w3d   2. Pregnancy Risk:  HIGH     Diagnoses and all orders for this visit:    1. Screening for blood or protein in urine (Primary)  -     POC Urinalysis Dipstick    2. Maternal anemia in pregnancy, antepartum  -     CBC (No Diff)    3. History of shoulder dystocia in prior pregnancy, currently pregnant    4. Rh negative, antepartum  Overview:  ABO / Rh (2024 12:07) Blood type: A negative          Routine labor warnings were discussed and indications for L & D f/u including bleeding, regular contractions, decreased fetal movement or/and rupture of membranes.   -----------------------  PLAN:   Return in about 1 week (around  2/7/2025), or ob check.  Declines c/s   GBS neg   Recheck CBC today     Cristy Metz MD  1/31/2025 13:33 EST

## 2025-02-01 ENCOUNTER — HOSPITAL ENCOUNTER (OUTPATIENT)
Facility: HOSPITAL | Age: 25
Discharge: HOME | End: 2025-02-01
Attending: OBSTETRICS & GYNECOLOGY | Admitting: OBSTETRICS & GYNECOLOGY
Payer: COMMERCIAL

## 2025-02-01 VITALS
SYSTOLIC BLOOD PRESSURE: 90 MMHG | TEMPERATURE: 98.4 F | RESPIRATION RATE: 16 BRPM | HEART RATE: 97 BPM | DIASTOLIC BLOOD PRESSURE: 46 MMHG

## 2025-02-01 LAB
ERYTHROCYTE [DISTWIDTH] IN BLOOD BY AUTOMATED COUNT: 15 % (ref 12.3–15.4)
FERRITIN SERPL-MCNC: 13.8 NG/ML (ref 13–150)
HCT VFR BLD AUTO: 28.4 % (ref 34–46.6)
HGB BLD-MCNC: 8.9 G/DL (ref 12–15.9)
IRON 24H UR-MRATE: 31 MCG/DL (ref 37–145)
IRON SATN MFR SERPL: 4 % (ref 20–50)
MCH RBC QN AUTO: 24.3 PG (ref 26.6–33)
MCHC RBC AUTO-ENTMCNC: 31.3 G/DL (ref 31.5–35.7)
MCV RBC AUTO: 77.6 FL (ref 79–97)
PLATELET # BLD AUTO: 199 10*3/MM3 (ref 140–450)
RBC # BLD AUTO: 3.66 10*6/MM3 (ref 3.77–5.28)
TIBC SERPL-MCNC: 769 MCG/DL (ref 298–536)
TRANSFERRIN SERPL-MCNC: 516 MG/DL (ref 200–360)
WBC # BLD AUTO: 10.44 10*3/MM3 (ref 3.4–10.8)

## 2025-02-01 PROCEDURE — 82728 ASSAY OF FERRITIN: CPT | Performed by: OBSTETRICS & GYNECOLOGY

## 2025-02-01 PROCEDURE — 25010000002 IRON SUCROSE PER 1 MG: Performed by: OBSTETRICS & GYNECOLOGY

## 2025-02-01 PROCEDURE — 25810000003 SODIUM CHLORIDE 0.9 % SOLUTION 250 ML FLEX CONT: Performed by: OBSTETRICS & GYNECOLOGY

## 2025-02-01 PROCEDURE — 96365 THER/PROPH/DIAG IV INF INIT: CPT

## 2025-02-01 PROCEDURE — 83540 ASSAY OF IRON: CPT | Performed by: OBSTETRICS & GYNECOLOGY

## 2025-02-01 PROCEDURE — 59025 FETAL NON-STRESS TEST: CPT

## 2025-02-01 PROCEDURE — 84466 ASSAY OF TRANSFERRIN: CPT | Performed by: OBSTETRICS & GYNECOLOGY

## 2025-02-01 PROCEDURE — 96366 THER/PROPH/DIAG IV INF ADDON: CPT

## 2025-02-01 RX ORDER — SODIUM CHLORIDE 0.9 % (FLUSH) 0.9 %
10 SYRINGE (ML) INJECTION AS NEEDED
Status: DISCONTINUED | OUTPATIENT
Start: 2025-02-01 | End: 2025-02-01 | Stop reason: HOSPADM

## 2025-02-01 RX ORDER — CETIRIZINE HYDROCHLORIDE 10 MG/1
10 TABLET ORAL ONCE
Status: COMPLETED | OUTPATIENT
Start: 2025-02-01 | End: 2025-02-01

## 2025-02-01 RX ORDER — ACETAMINOPHEN 325 MG/1
650 TABLET ORAL ONCE
Status: COMPLETED | OUTPATIENT
Start: 2025-02-01 | End: 2025-02-01

## 2025-02-01 RX ORDER — SODIUM CHLORIDE 9 MG/ML
40 INJECTION, SOLUTION INTRAVENOUS AS NEEDED
Status: DISCONTINUED | OUTPATIENT
Start: 2025-02-01 | End: 2025-02-01 | Stop reason: HOSPADM

## 2025-02-01 RX ORDER — SODIUM CHLORIDE 0.9 % (FLUSH) 0.9 %
10 SYRINGE (ML) INJECTION EVERY 12 HOURS SCHEDULED
Status: DISCONTINUED | OUTPATIENT
Start: 2025-02-01 | End: 2025-02-01 | Stop reason: HOSPADM

## 2025-02-01 RX ADMIN — CETIRIZINE HYDROCHLORIDE 10 MG: 10 TABLET, FILM COATED ORAL at 15:30

## 2025-02-01 RX ADMIN — ACETAMINOPHEN 650 MG: 325 TABLET, FILM COATED ORAL at 15:30

## 2025-02-01 RX ADMIN — IRON SUCROSE 300 MG: 20 INJECTION, SOLUTION INTRAVENOUS at 15:35

## 2025-02-01 NOTE — NON STRESS TEST
Paulette Schulernicholeulysses, a  at 37w4d with an BIPIN of 2025, Date entered prior to episode creation, was seen at [facility] for a nonstress test.    No chief complaint on file.      Patient Active Problem List   Diagnosis    Rh negative, antepartum    History of shoulder dystocia in prior pregnancy, currently pregnant    Pregnancy    Maternal anemia in pregnancy, antepartum    Pelvic pressure in pregnancy, third trimester       Start Time: 1320  Stop Time:     Interpretation A  Nonstress Test Interpretation A: Reactive

## 2025-02-03 ENCOUNTER — TELEPHONE (OUTPATIENT)
Dept: OBSTETRICS AND GYNECOLOGY | Age: 25
End: 2025-02-03

## 2025-02-03 ENCOUNTER — HOSPITAL ENCOUNTER (OUTPATIENT)
Facility: HOSPITAL | Age: 25
Discharge: HOME | End: 2025-02-03
Attending: OBSTETRICS & GYNECOLOGY | Admitting: OBSTETRICS & GYNECOLOGY
Payer: COMMERCIAL

## 2025-02-03 VITALS
HEIGHT: 65 IN | WEIGHT: 185 LBS | TEMPERATURE: 98 F | BODY MASS INDEX: 30.82 KG/M2 | HEART RATE: 104 BPM | SYSTOLIC BLOOD PRESSURE: 104 MMHG | RESPIRATION RATE: 17 BRPM | DIASTOLIC BLOOD PRESSURE: 55 MMHG | OXYGEN SATURATION: 98 %

## 2025-02-03 PROCEDURE — 25810000003 SODIUM CHLORIDE 0.9 % SOLUTION 250 ML FLEX CONT: Performed by: OBSTETRICS & GYNECOLOGY

## 2025-02-03 PROCEDURE — 96365 THER/PROPH/DIAG IV INF INIT: CPT

## 2025-02-03 PROCEDURE — G0378 HOSPITAL OBSERVATION PER HR: HCPCS

## 2025-02-03 PROCEDURE — 96366 THER/PROPH/DIAG IV INF ADDON: CPT

## 2025-02-03 PROCEDURE — 59025 FETAL NON-STRESS TEST: CPT

## 2025-02-03 PROCEDURE — 25010000002 IRON SUCROSE PER 1 MG: Performed by: OBSTETRICS & GYNECOLOGY

## 2025-02-03 RX ORDER — ACETAMINOPHEN 325 MG/1
650 TABLET ORAL ONCE
Status: COMPLETED | OUTPATIENT
Start: 2025-02-03 | End: 2025-02-03

## 2025-02-03 RX ORDER — CETIRIZINE HYDROCHLORIDE 10 MG/1
10 TABLET ORAL ONCE
Status: COMPLETED | OUTPATIENT
Start: 2025-02-03 | End: 2025-02-03

## 2025-02-03 RX ADMIN — ACETAMINOPHEN 650 MG: 325 TABLET, FILM COATED ORAL at 14:19

## 2025-02-03 RX ADMIN — IRON SUCROSE 300 MG: 20 INJECTION, SOLUTION INTRAVENOUS at 14:19

## 2025-02-03 RX ADMIN — CETIRIZINE HYDROCHLORIDE 10 MG: 10 TABLET, FILM COATED ORAL at 14:18

## 2025-02-03 NOTE — NON STRESS TEST
Paulette Martinez, a  at 37w6d with an BIPIN of 2025, Date entered prior to episode creation, was seen at [facility]OR DELIV[facility] for a nonstress test.    Chief Complaint   Patient presents with    here for an iron infusion      Pt is here for an iron infusion        Patient Active Problem List   Diagnosis    Rh negative, antepartum    History of shoulder dystocia in prior pregnancy, currently pregnant    Pregnancy    Maternal anemia in pregnancy, antepartum    Pelvic pressure in pregnancy, third trimester       Start Time:1308  Stop Time: 1640    Interpretation A  Nonstress Test Interpretation A: Reactive

## 2025-02-03 NOTE — TELEPHONE ENCOUNTER
Please notify patient to go to labor and delivery day for a second IV iron infusion.  We will also do 1 Wednesday when I am on-call.  These let Dr. Santana know she is coming.  In labor and delivery.

## 2025-02-03 NOTE — TELEPHONE ENCOUNTER
Dianne in L&D notified of pt needing iron infusion,pt can come in anytime today.LM on pts vm to call me.L&D aware to call you for orders.Note sent to Dr Santana    Just spoke to Paulette ,she will go in after lunch

## 2025-02-05 RX ORDER — FERROUS SULFATE 325(65) MG
325 TABLET ORAL
Qty: 90 TABLET | Refills: 3 | Status: SHIPPED | OUTPATIENT
Start: 2025-02-05

## 2025-02-07 ENCOUNTER — ROUTINE PRENATAL (OUTPATIENT)
Dept: OBSTETRICS AND GYNECOLOGY | Age: 25
End: 2025-02-07
Payer: COMMERCIAL

## 2025-02-07 VITALS — DIASTOLIC BLOOD PRESSURE: 80 MMHG | WEIGHT: 186 LBS | SYSTOLIC BLOOD PRESSURE: 120 MMHG | BODY MASS INDEX: 30.95 KG/M2

## 2025-02-07 DIAGNOSIS — O99.019 MATERNAL ANEMIA IN PREGNANCY, ANTEPARTUM: ICD-10-CM

## 2025-02-07 DIAGNOSIS — Z67.91 RH NEGATIVE, ANTEPARTUM: ICD-10-CM

## 2025-02-07 DIAGNOSIS — O26.893 PELVIC PRESSURE IN PREGNANCY, THIRD TRIMESTER: ICD-10-CM

## 2025-02-07 DIAGNOSIS — O09.299 HISTORY OF SHOULDER DYSTOCIA IN PRIOR PREGNANCY, CURRENTLY PREGNANT: ICD-10-CM

## 2025-02-07 DIAGNOSIS — Z34.83 PRENATAL CARE, SUBSEQUENT PREGNANCY IN THIRD TRIMESTER: Primary | ICD-10-CM

## 2025-02-07 DIAGNOSIS — Z13.89 SCREENING FOR BLOOD OR PROTEIN IN URINE: ICD-10-CM

## 2025-02-07 DIAGNOSIS — O26.899 RH NEGATIVE, ANTEPARTUM: ICD-10-CM

## 2025-02-07 DIAGNOSIS — R10.2 PELVIC PRESSURE IN PREGNANCY, THIRD TRIMESTER: ICD-10-CM

## 2025-02-07 LAB
BILIRUB BLD-MCNC: NEGATIVE MG/DL
ERYTHROCYTE [DISTWIDTH] IN BLOOD BY AUTOMATED COUNT: 17 % (ref 12.3–15.4)
GLUCOSE UR STRIP-MCNC: NEGATIVE MG/DL
HCT VFR BLD AUTO: 29.8 % (ref 34–46.6)
HGB BLD-MCNC: 9.6 G/DL (ref 12–15.9)
KETONES UR QL: NEGATIVE
LEUKOCYTE EST, POC: NEGATIVE
MCH RBC QN AUTO: 24.7 PG (ref 26.6–33)
MCHC RBC AUTO-ENTMCNC: 32.2 G/DL (ref 31.5–35.7)
MCV RBC AUTO: 76.6 FL (ref 79–97)
NITRITE UR-MCNC: NEGATIVE MG/ML
PH UR: 7 [PH] (ref 5–8)
PLATELET # BLD AUTO: 232 10*3/MM3 (ref 140–450)
PROT UR STRIP-MCNC: NEGATIVE MG/DL
RBC # BLD AUTO: 3.89 10*6/MM3 (ref 3.77–5.28)
RBC # UR STRIP: NEGATIVE /UL
SP GR UR: 1.02 (ref 1–1.03)
UROBILINOGEN UR QL: NORMAL
WBC # BLD AUTO: 10.61 10*3/MM3 (ref 3.4–10.8)

## 2025-02-07 NOTE — PROGRESS NOTES
Chief Complaint   Patient presents with    Routine Prenatal Visit     Cc: ob check today , GBS neg , patient wants cervical check today having lots of pressure ,no VB or fluid loss , reports good FM , had 2 iron infusions .       HPI: 24 y.o.  at 38w3d presents for prenatal care     Last OB US Data (since 2024)         Value Time User    EFW%ILE  30%ile 2025  4:10 PM Cristy Metz MD    AC%ILE  44%ile 2025  4:10 PM Cristy Metz MD    Fetal Survey  NL/Complete 2024  8:55 AM Cristy Metz MD    Placenta location  Posterior 2025  4:10 PM Cristy Metz MD          Vitals:    25 1212   BP: 120/80   Weight: 84.4 kg (186 lb)     Total weight gain for pregnancy:  18.6 kg (41 lb)    Review of systems:   Gen: negative  CV:     negative  GI: negative  :   negative and good fetal movement noted   MS:    negative  Neuro: negative  Pul: negative    Physical Exam  Vitals and nursing note reviewed.   Constitutional:       Appearance: Normal appearance.   Pulmonary:      Effort: Pulmonary effort is normal.   Neurological:      Mental Status: She is alert and oriented to person, place, and time.   Psychiatric:         Mood and Affect: Mood normal.         Behavior: Behavior normal.         Thought Content: Thought content normal.         Judgment: Judgment normal.           A/P  1. Intrauterine pregnancy at 38w3d   2. Pregnancy Risk:  HIGH RISK    Diagnoses and all orders for this visit:    1. Prenatal care, subsequent pregnancy in third trimester (Primary)    2. Screening for blood or protein in urine  -     POC Urinalysis Dipstick    3. Maternal anemia in pregnancy, antepartum  -     CBC (No Diff)    4. Rh negative, antepartum  Overview:  ABO / Rh (2024 12:07) Blood type: A negative      5. Pelvic pressure in pregnancy, third trimester    6. History of shoulder dystocia in prior pregnancy, currently pregnant        Pre-eclampsia symptoms were discussed and  warnings were given.  Routine labor warnings were discussed and indications for L & D f/u including bleeding, regular contractions, decreased fetal movement or/and rupture of membranes.   Nutrition and weight gain were addressed.  -----------------------  PLAN:   Return in about 3 days (around 2/10/2025), or ob check.      Cristy Metz MD  2/7/2025 12:31 EST

## 2025-02-10 ENCOUNTER — ROUTINE PRENATAL (OUTPATIENT)
Dept: OBSTETRICS AND GYNECOLOGY | Age: 25
End: 2025-02-10
Payer: COMMERCIAL

## 2025-02-10 VITALS — DIASTOLIC BLOOD PRESSURE: 80 MMHG | SYSTOLIC BLOOD PRESSURE: 120 MMHG | WEIGHT: 188 LBS | BODY MASS INDEX: 31.28 KG/M2

## 2025-02-10 DIAGNOSIS — Z67.91 RH NEGATIVE, ANTEPARTUM: ICD-10-CM

## 2025-02-10 DIAGNOSIS — Z13.89 SCREENING FOR BLOOD OR PROTEIN IN URINE: ICD-10-CM

## 2025-02-10 DIAGNOSIS — O26.899 RH NEGATIVE, ANTEPARTUM: ICD-10-CM

## 2025-02-10 DIAGNOSIS — O99.019 MATERNAL ANEMIA IN PREGNANCY, ANTEPARTUM: ICD-10-CM

## 2025-02-10 DIAGNOSIS — O09.299 HISTORY OF SHOULDER DYSTOCIA IN PRIOR PREGNANCY, CURRENTLY PREGNANT: ICD-10-CM

## 2025-02-10 DIAGNOSIS — Z34.83 PRENATAL CARE, SUBSEQUENT PREGNANCY IN THIRD TRIMESTER: Primary | ICD-10-CM

## 2025-02-10 LAB
BILIRUB BLD-MCNC: NEGATIVE MG/DL
GLUCOSE UR STRIP-MCNC: NEGATIVE MG/DL
KETONES UR QL: NEGATIVE
LEUKOCYTE EST, POC: ABNORMAL
NITRITE UR-MCNC: NEGATIVE MG/ML
PH UR: 6.5 [PH] (ref 5–8)
PROT UR STRIP-MCNC: NEGATIVE MG/DL
RBC # UR STRIP: NEGATIVE /UL
SP GR UR: 1.01 (ref 1–1.03)
UROBILINOGEN UR QL: NORMAL

## 2025-02-10 NOTE — PROGRESS NOTES
Chief Complaint   Patient presents with    Routine Prenatal Visit     Cc:  ob check today , GBS neg , patient lost mucus plug over the weekend , reports good FM , no other complaints , no Ctx . Wants cervical check today .       HPI: 24 y.o.  at 38w6d presents  for prenatal care    Last OB US Data (since 2024)         Value Time User    EFW%ILE  30%ile 2025  4:10 PM Cristy Metz MD    AC%ILE  44%ile 2025  4:10 PM Cristy Metz MD    Fetal Survey  NL/Complete 2024  8:55 AM Cristy Metz MD    Placenta location  Posterior 2025  4:10 PM Cristy Metz MD          Vitals:    02/10/25 0911   BP: 120/80   Weight: 85.3 kg (188 lb)     Total weight gain for pregnancy:  19.5 kg (43 lb)    Review of systems:   Gen: negative  CV:     negative  GI: negative  :   negative and good fetal movement noted   MS:    negative  Neuro: negative  Pul: negative    Physical Exam  Vitals and nursing note reviewed.   Constitutional:       Appearance: Normal appearance. She is normal weight.   Pulmonary:      Effort: Pulmonary effort is normal.   Neurological:      Mental Status: She is alert and oriented to person, place, and time.   Psychiatric:         Mood and Affect: Mood normal.         Behavior: Behavior normal.           A/P  1. Intrauterine pregnancy at 38w6d   2. Pregnancy Risk:  HIGH RISK    Diagnoses and all orders for this visit:    1. Prenatal care, subsequent pregnancy in third trimester (Primary)    2. Screening for blood or protein in urine  -     POC Urinalysis Dipstick    3. Maternal anemia in pregnancy, antepartum    4. Rh negative, antepartum  Overview:  ABO / Rh (2024 12:07) Blood type: A negative      5. History of shoulder dystocia in prior pregnancy, currently pregnant        Pre-eclampsia symptoms were discussed and warnings were given.  Routine labor warnings were discussed and indications for L & D f/u including bleeding, regular contractions,  decreased fetal movement or/and rupture of membranes.   Nutrition and weight gain were addressed.  -----------------------  PLAN:   Return in about 1 week (around 2025), or ob check with NP.  H/O SD - declines primary  section   Declines IOL     Cristy Metz MD  2/10/2025 09:23 EST

## 2025-02-17 ENCOUNTER — PREP FOR SURGERY (OUTPATIENT)
Dept: OTHER | Facility: HOSPITAL | Age: 25
End: 2025-02-17
Payer: COMMERCIAL

## 2025-02-17 ENCOUNTER — ROUTINE PRENATAL (OUTPATIENT)
Dept: OBSTETRICS AND GYNECOLOGY | Age: 25
End: 2025-02-17
Payer: COMMERCIAL

## 2025-02-17 VITALS — WEIGHT: 188.8 LBS | DIASTOLIC BLOOD PRESSURE: 72 MMHG | BODY MASS INDEX: 31.42 KG/M2 | SYSTOLIC BLOOD PRESSURE: 118 MMHG

## 2025-02-17 DIAGNOSIS — O09.299 HISTORY OF SHOULDER DYSTOCIA IN PRIOR PREGNANCY, CURRENTLY PREGNANT: ICD-10-CM

## 2025-02-17 DIAGNOSIS — O26.899 RH NEGATIVE, ANTEPARTUM: Primary | ICD-10-CM

## 2025-02-17 DIAGNOSIS — Z3A.39 39 WEEKS GESTATION OF PREGNANCY: Primary | ICD-10-CM

## 2025-02-17 DIAGNOSIS — O99.019 MATERNAL ANEMIA IN PREGNANCY, ANTEPARTUM: ICD-10-CM

## 2025-02-17 DIAGNOSIS — Z67.91 RH NEGATIVE, ANTEPARTUM: Primary | ICD-10-CM

## 2025-02-17 LAB
GLUCOSE UR STRIP-MCNC: NEGATIVE MG/DL
PROT UR STRIP-MCNC: NEGATIVE MG/DL

## 2025-02-17 RX ORDER — OXYTOCIN/0.9 % SODIUM CHLORIDE 30/500 ML
2-20 PLASTIC BAG, INJECTION (ML) INTRAVENOUS
Status: CANCELLED | OUTPATIENT
Start: 2025-02-17

## 2025-02-17 RX ORDER — FAMOTIDINE 20 MG/1
20 TABLET, FILM COATED ORAL 2 TIMES DAILY PRN
Status: CANCELLED | OUTPATIENT
Start: 2025-02-17

## 2025-02-17 RX ORDER — OXYTOCIN/0.9 % SODIUM CHLORIDE 30/500 ML
999 PLASTIC BAG, INJECTION (ML) INTRAVENOUS ONCE
Status: CANCELLED | OUTPATIENT
Start: 2025-02-17

## 2025-02-17 RX ORDER — CARBOPROST TROMETHAMINE 250 UG/ML
250 INJECTION, SOLUTION INTRAMUSCULAR
Status: CANCELLED | OUTPATIENT
Start: 2025-02-17

## 2025-02-17 RX ORDER — TERBUTALINE SULFATE 1 MG/ML
0.25 INJECTION, SOLUTION SUBCUTANEOUS AS NEEDED
Status: CANCELLED | OUTPATIENT
Start: 2025-02-17

## 2025-02-17 RX ORDER — FAMOTIDINE 10 MG/ML
20 INJECTION, SOLUTION INTRAVENOUS 2 TIMES DAILY PRN
Status: CANCELLED | OUTPATIENT
Start: 2025-02-17

## 2025-02-17 RX ORDER — ACETAMINOPHEN 325 MG/1
650 TABLET ORAL EVERY 4 HOURS PRN
Status: CANCELLED | OUTPATIENT
Start: 2025-02-17

## 2025-02-17 RX ORDER — ONDANSETRON 2 MG/ML
4 INJECTION INTRAMUSCULAR; INTRAVENOUS EVERY 6 HOURS PRN
Status: CANCELLED | OUTPATIENT
Start: 2025-02-17

## 2025-02-17 RX ORDER — LIDOCAINE HYDROCHLORIDE 10 MG/ML
0.5 INJECTION, SOLUTION INFILTRATION; PERINEURAL ONCE AS NEEDED
Status: CANCELLED | OUTPATIENT
Start: 2025-02-17

## 2025-02-17 RX ORDER — METHYLERGONOVINE MALEATE 0.2 MG/ML
200 INJECTION INTRAVENOUS ONCE AS NEEDED
Status: CANCELLED | OUTPATIENT
Start: 2025-02-17

## 2025-02-17 RX ORDER — TRANEXAMIC ACID 10 MG/ML
1000 INJECTION, SOLUTION INTRAVENOUS ONCE AS NEEDED
Status: CANCELLED | OUTPATIENT
Start: 2025-02-17

## 2025-02-17 RX ORDER — MISOPROSTOL 200 UG/1
800 TABLET ORAL ONCE AS NEEDED
Status: CANCELLED | OUTPATIENT
Start: 2025-02-17

## 2025-02-17 RX ORDER — ONDANSETRON 4 MG/1
4 TABLET, ORALLY DISINTEGRATING ORAL EVERY 6 HOURS PRN
Status: CANCELLED | OUTPATIENT
Start: 2025-02-17

## 2025-02-17 RX ORDER — SODIUM CHLORIDE 9 MG/ML
40 INJECTION, SOLUTION INTRAVENOUS AS NEEDED
Status: CANCELLED | OUTPATIENT
Start: 2025-02-17

## 2025-02-17 RX ORDER — MAGNESIUM CARB/ALUMINUM HYDROX 105-160MG
30 TABLET,CHEWABLE ORAL ONCE AS NEEDED
Status: CANCELLED | OUTPATIENT
Start: 2025-02-17

## 2025-02-17 RX ORDER — OXYTOCIN/0.9 % SODIUM CHLORIDE 30/500 ML
250 PLASTIC BAG, INJECTION (ML) INTRAVENOUS CONTINUOUS
Status: CANCELLED | OUTPATIENT
Start: 2025-02-17 | End: 2025-02-17

## 2025-02-17 RX ORDER — SODIUM CHLORIDE 0.9 % (FLUSH) 0.9 %
10 SYRINGE (ML) INJECTION AS NEEDED
Status: CANCELLED | OUTPATIENT
Start: 2025-02-17

## 2025-02-17 RX ORDER — SODIUM CHLORIDE 0.9 % (FLUSH) 0.9 %
10 SYRINGE (ML) INJECTION EVERY 12 HOURS SCHEDULED
Status: CANCELLED | OUTPATIENT
Start: 2025-02-17

## 2025-02-17 RX ORDER — SODIUM CHLORIDE, SODIUM LACTATE, POTASSIUM CHLORIDE, CALCIUM CHLORIDE 600; 310; 30; 20 MG/100ML; MG/100ML; MG/100ML; MG/100ML
125 INJECTION, SOLUTION INTRAVENOUS CONTINUOUS
Status: CANCELLED | OUTPATIENT
Start: 2025-02-17 | End: 2025-02-17

## 2025-02-17 NOTE — PROGRESS NOTES
Paulette Martinez, a 24 y.o.  at 39w6d, presents for OB follow-up.  She is doing well today.  Denies loss of fluid, vaginal bleeding, and contractions.  Endorses fetal movements.    Objective  BP Readings from Last 3 Encounters:   25 118/72   02/10/25 120/80   25 120/80      Wt Readings from Last 3 Encounters:   25 85.6 kg (188 lb 12.8 oz)   02/10/25 85.3 kg (188 lb)   25 84.4 kg (186 lb)      Total weight gain this pregnancy: 19.9 kg (43 lb 12.8 oz)    Review of systems:   Gen: negative  CV:     negative  GI: negative  :   negative and good fetal movement noted   MS:    negative  Neuro: negative and denies headaches and visual changes   Pul: negative    A/P  Diagnoses and all orders for this visit:    1. 39 weeks gestation of pregnancy (Primary)  -     POC Urinalysis Dipstick        Routine labor warnings were discussed and indications for L & D f/u including bleeding, regular contractions, decreased fetal movement or/and rupture of membranes.   [facility] [place]iscussed  BPP   IOL planned Wednesday, instructions reviewed with patient      CHARITO Garcia

## 2025-02-19 ENCOUNTER — ANESTHESIA (OUTPATIENT)
Dept: LABOR AND DELIVERY | Facility: HOSPITAL | Age: 25
End: 2025-02-19
Payer: COMMERCIAL

## 2025-02-19 ENCOUNTER — HOSPITAL ENCOUNTER (OUTPATIENT)
Dept: LABOR AND DELIVERY | Facility: HOSPITAL | Age: 25
Discharge: HOME | End: 2025-02-19
Payer: COMMERCIAL

## 2025-02-19 ENCOUNTER — HOSPITAL ENCOUNTER (INPATIENT)
Facility: HOSPITAL | Age: 25
LOS: 2 days | Discharge: HOME | End: 2025-02-21
Attending: OBSTETRICS & GYNECOLOGY | Admitting: OBSTETRICS & GYNECOLOGY
Payer: COMMERCIAL

## 2025-02-19 ENCOUNTER — ANESTHESIA EVENT (OUTPATIENT)
Dept: LABOR AND DELIVERY | Facility: HOSPITAL | Age: 25
End: 2025-02-19
Payer: COMMERCIAL

## 2025-02-19 PROBLEM — O48.0 POST-DATES PREGNANCY: Status: ACTIVE | Noted: 2025-02-19

## 2025-02-19 LAB
ABO GROUP BLD: NORMAL
ABO GROUP BLD: NORMAL
ALBUMIN SERPL-MCNC: 3.4 G/DL (ref 3.5–5.2)
ALBUMIN/GLOB SERPL: 1 G/DL
ALP SERPL-CCNC: 188 U/L (ref 39–117)
ALT SERPL W P-5'-P-CCNC: 12 U/L (ref 1–33)
ANION GAP SERPL CALCULATED.3IONS-SCNC: 12.3 MMOL/L (ref 5–15)
AST SERPL-CCNC: 15 U/L (ref 1–32)
BASOPHILS # BLD AUTO: 0.01 10*3/MM3 (ref 0–0.2)
BASOPHILS NFR BLD AUTO: 0.1 % (ref 0–1.5)
BILIRUB SERPL-MCNC: 0.2 MG/DL (ref 0–1.2)
BLD GP AB SCN SERPL QL: NEGATIVE
BUN SERPL-MCNC: 5 MG/DL (ref 6–20)
BUN/CREAT SERPL: 10.2 (ref 7–25)
CALCIUM SPEC-SCNC: 9.1 MG/DL (ref 8.6–10.5)
CHLORIDE SERPL-SCNC: 105 MMOL/L (ref 98–107)
CO2 SERPL-SCNC: 20.7 MMOL/L (ref 22–29)
CREAT SERPL-MCNC: 0.49 MG/DL (ref 0.57–1)
DEPRECATED RDW RBC AUTO: 61.4 FL (ref 37–54)
EGFRCR SERPLBLD CKD-EPI 2021: 136.9 ML/MIN/1.73
EOSINOPHIL # BLD AUTO: 0.03 10*3/MM3 (ref 0–0.4)
EOSINOPHIL NFR BLD AUTO: 0.3 % (ref 0.3–6.2)
ERYTHROCYTE [DISTWIDTH] IN BLOOD BY AUTOMATED COUNT: 21.6 % (ref 12.3–15.4)
FETAL BLEED: NEGATIVE
GLOBULIN UR ELPH-MCNC: 3.5 GM/DL
GLUCOSE SERPL-MCNC: 91 MG/DL (ref 65–99)
HCT VFR BLD AUTO: 35.7 % (ref 34–46.6)
HGB BLD-MCNC: 10.8 G/DL (ref 12–15.9)
IMM GRANULOCYTES # BLD AUTO: 0.03 10*3/MM3 (ref 0–0.05)
IMM GRANULOCYTES NFR BLD AUTO: 0.3 % (ref 0–0.5)
LYMPHOCYTES # BLD AUTO: 1.62 10*3/MM3 (ref 0.7–3.1)
LYMPHOCYTES NFR BLD AUTO: 18.9 % (ref 19.6–45.3)
MCH RBC QN AUTO: 24.4 PG (ref 26.6–33)
MCHC RBC AUTO-ENTMCNC: 30.3 G/DL (ref 31.5–35.7)
MCV RBC AUTO: 80.6 FL (ref 79–97)
MONOCYTES # BLD AUTO: 0.71 10*3/MM3 (ref 0.1–0.9)
MONOCYTES NFR BLD AUTO: 8.3 % (ref 5–12)
NEUTROPHILS NFR BLD AUTO: 6.19 10*3/MM3 (ref 1.7–7)
NEUTROPHILS NFR BLD AUTO: 72.1 % (ref 42.7–76)
NRBC BLD AUTO-RTO: 0 /100 WBC (ref 0–0.2)
NUMBER OF DOSES: NORMAL
PLATELET # BLD AUTO: 207 10*3/MM3 (ref 140–450)
PMV BLD AUTO: 10.6 FL (ref 6–12)
POTASSIUM SERPL-SCNC: 3.9 MMOL/L (ref 3.5–5.2)
PROT SERPL-MCNC: 6.9 G/DL (ref 6–8.5)
RBC # BLD AUTO: 4.43 10*6/MM3 (ref 3.77–5.28)
RH BLD: NEGATIVE
RH BLD: NEGATIVE
SODIUM SERPL-SCNC: 138 MMOL/L (ref 136–145)
T&S EXPIRATION DATE: NORMAL
TREPONEMA PALLIDUM IGG+IGM AB [PRESENCE] IN SERUM OR PLASMA BY IMMUNOASSAY: NORMAL
WBC NRBC COR # BLD AUTO: 8.59 10*3/MM3 (ref 3.4–10.8)

## 2025-02-19 PROCEDURE — 25010000002 METHYLERGONOVINE MALEATE PER 0.2 MG: Performed by: OBSTETRICS & GYNECOLOGY

## 2025-02-19 PROCEDURE — 0UC97ZZ EXTIRPATION OF MATTER FROM UTERUS, VIA NATURAL OR ARTIFICIAL OPENING: ICD-10-PCS | Performed by: OBSTETRICS & GYNECOLOGY

## 2025-02-19 PROCEDURE — 10907ZC DRAINAGE OF AMNIOTIC FLUID, THERAPEUTIC FROM PRODUCTS OF CONCEPTION, VIA NATURAL OR ARTIFICIAL OPENING: ICD-10-PCS | Performed by: OBSTETRICS & GYNECOLOGY

## 2025-02-19 PROCEDURE — C1755 CATHETER, INTRASPINAL: HCPCS | Performed by: ANESTHESIOLOGY

## 2025-02-19 PROCEDURE — 25010000002 LIDOCAINE-EPINEPHRINE (PF) 1 %-1:200000 SOLUTION: Performed by: ANESTHESIOLOGY

## 2025-02-19 PROCEDURE — 3E033VJ INTRODUCTION OF OTHER HORMONE INTO PERIPHERAL VEIN, PERCUTANEOUS APPROACH: ICD-10-PCS | Performed by: OBSTETRICS & GYNECOLOGY

## 2025-02-19 PROCEDURE — 25010000002 ROPIVACAINE PER 1 MG: Performed by: ANESTHESIOLOGY

## 2025-02-19 PROCEDURE — 25010000002 ONDANSETRON PER 1 MG: Performed by: OBSTETRICS & GYNECOLOGY

## 2025-02-19 PROCEDURE — 86850 RBC ANTIBODY SCREEN: CPT | Performed by: OBSTETRICS & GYNECOLOGY

## 2025-02-19 PROCEDURE — 80053 COMPREHEN METABOLIC PANEL: CPT | Performed by: OBSTETRICS & GYNECOLOGY

## 2025-02-19 PROCEDURE — 85025 COMPLETE CBC W/AUTO DIFF WBC: CPT | Performed by: OBSTETRICS & GYNECOLOGY

## 2025-02-19 PROCEDURE — 86901 BLOOD TYPING SEROLOGIC RH(D): CPT | Performed by: OBSTETRICS & GYNECOLOGY

## 2025-02-19 PROCEDURE — 86900 BLOOD TYPING SEROLOGIC ABO: CPT | Performed by: OBSTETRICS & GYNECOLOGY

## 2025-02-19 PROCEDURE — 25010000002 LIDOCAINE-EPINEPHRINE (PF) 2 %-1:200000 SOLUTION: Performed by: ANESTHESIOLOGY

## 2025-02-19 PROCEDURE — 85461 HEMOGLOBIN FETAL: CPT | Performed by: OBSTETRICS & GYNECOLOGY

## 2025-02-19 PROCEDURE — 59410 OBSTETRICAL CARE: CPT | Performed by: OBSTETRICS & GYNECOLOGY

## 2025-02-19 PROCEDURE — 86780 TREPONEMA PALLIDUM: CPT | Performed by: OBSTETRICS & GYNECOLOGY

## 2025-02-19 PROCEDURE — 25810000003 LACTATED RINGERS PER 1000 ML: Performed by: OBSTETRICS & GYNECOLOGY

## 2025-02-19 RX ORDER — SODIUM CHLORIDE 9 MG/ML
40 INJECTION, SOLUTION INTRAVENOUS AS NEEDED
Status: DISCONTINUED | OUTPATIENT
Start: 2025-02-19 | End: 2025-02-21 | Stop reason: HOSPADM

## 2025-02-19 RX ORDER — HYDROCODONE BITARTRATE AND ACETAMINOPHEN 5; 325 MG/1; MG/1
1 TABLET ORAL EVERY 4 HOURS PRN
Status: DISCONTINUED | OUTPATIENT
Start: 2025-02-19 | End: 2025-02-21 | Stop reason: HOSPADM

## 2025-02-19 RX ORDER — SODIUM CHLORIDE 0.9 % (FLUSH) 0.9 %
1-10 SYRINGE (ML) INJECTION AS NEEDED
Status: DISCONTINUED | OUTPATIENT
Start: 2025-02-19 | End: 2025-02-21 | Stop reason: HOSPADM

## 2025-02-19 RX ORDER — IBUPROFEN 600 MG/1
600 TABLET, FILM COATED ORAL EVERY 6 HOURS PRN
Status: DISCONTINUED | OUTPATIENT
Start: 2025-02-19 | End: 2025-02-21 | Stop reason: HOSPADM

## 2025-02-19 RX ORDER — DOCUSATE SODIUM 100 MG/1
100 CAPSULE, LIQUID FILLED ORAL 2 TIMES DAILY
Status: DISCONTINUED | OUTPATIENT
Start: 2025-02-20 | End: 2025-02-21 | Stop reason: HOSPADM

## 2025-02-19 RX ORDER — HYDROCODONE BITARTRATE AND ACETAMINOPHEN 10; 325 MG/1; MG/1
1 TABLET ORAL EVERY 4 HOURS PRN
Status: DISCONTINUED | OUTPATIENT
Start: 2025-02-19 | End: 2025-02-21 | Stop reason: HOSPADM

## 2025-02-19 RX ORDER — OXYTOCIN/0.9 % SODIUM CHLORIDE 30/500 ML
125 PLASTIC BAG, INJECTION (ML) INTRAVENOUS ONCE AS NEEDED
Status: COMPLETED | OUTPATIENT
Start: 2025-02-19 | End: 2025-02-19

## 2025-02-19 RX ORDER — ONDANSETRON 2 MG/ML
4 INJECTION INTRAMUSCULAR; INTRAVENOUS EVERY 6 HOURS PRN
Status: DISCONTINUED | OUTPATIENT
Start: 2025-02-19 | End: 2025-02-21 | Stop reason: HOSPADM

## 2025-02-19 RX ORDER — PROMETHAZINE HYDROCHLORIDE 12.5 MG/1
12.5 TABLET ORAL EVERY 4 HOURS PRN
Status: DISCONTINUED | OUTPATIENT
Start: 2025-02-19 | End: 2025-02-21 | Stop reason: HOSPADM

## 2025-02-19 RX ORDER — EPHEDRINE SULFATE 50 MG/ML
5 INJECTION, SOLUTION INTRAVENOUS
Status: DISCONTINUED | OUTPATIENT
Start: 2025-02-19 | End: 2025-02-19 | Stop reason: HOSPADM

## 2025-02-19 RX ORDER — CARBOPROST TROMETHAMINE 250 UG/ML
250 INJECTION, SOLUTION INTRAMUSCULAR
Status: DISCONTINUED | OUTPATIENT
Start: 2025-02-19 | End: 2025-02-19 | Stop reason: HOSPADM

## 2025-02-19 RX ORDER — ONDANSETRON 4 MG/1
4 TABLET, ORALLY DISINTEGRATING ORAL EVERY 8 HOURS PRN
Status: DISCONTINUED | OUTPATIENT
Start: 2025-02-19 | End: 2025-02-21 | Stop reason: HOSPADM

## 2025-02-19 RX ORDER — ONDANSETRON 4 MG/1
4 TABLET, ORALLY DISINTEGRATING ORAL EVERY 6 HOURS PRN
Status: DISCONTINUED | OUTPATIENT
Start: 2025-02-19 | End: 2025-02-19 | Stop reason: HOSPADM

## 2025-02-19 RX ORDER — ONDANSETRON 2 MG/ML
4 INJECTION INTRAMUSCULAR; INTRAVENOUS EVERY 6 HOURS PRN
Status: DISCONTINUED | OUTPATIENT
Start: 2025-02-19 | End: 2025-02-19 | Stop reason: HOSPADM

## 2025-02-19 RX ORDER — TRANEXAMIC ACID 10 MG/ML
1000 INJECTION, SOLUTION INTRAVENOUS ONCE AS NEEDED
Status: DISCONTINUED | OUTPATIENT
Start: 2025-02-19 | End: 2025-02-21

## 2025-02-19 RX ORDER — FENTANYL/ROPIVACAINE/NS/PF 2MCG/ML-.2
10 PLASTIC BAG, INJECTION (ML) INJECTION CONTINUOUS
Status: DISCONTINUED | OUTPATIENT
Start: 2025-02-19 | End: 2025-02-21

## 2025-02-19 RX ORDER — MAGNESIUM CARB/ALUMINUM HYDROX 105-160MG
30 TABLET,CHEWABLE ORAL ONCE AS NEEDED
Status: DISCONTINUED | OUTPATIENT
Start: 2025-02-19 | End: 2025-02-19 | Stop reason: HOSPADM

## 2025-02-19 RX ORDER — LIDOCAINE HYDROCHLORIDE 10 MG/ML
0.5 INJECTION, SOLUTION INFILTRATION; PERINEURAL ONCE AS NEEDED
Status: DISCONTINUED | OUTPATIENT
Start: 2025-02-19 | End: 2025-02-21 | Stop reason: HOSPADM

## 2025-02-19 RX ORDER — METHYLERGONOVINE MALEATE 0.2 MG/1
200 TABLET ORAL EVERY 6 HOURS SCHEDULED
Status: COMPLETED | OUTPATIENT
Start: 2025-02-19 | End: 2025-02-20

## 2025-02-19 RX ORDER — FAMOTIDINE 10 MG/ML
20 INJECTION, SOLUTION INTRAVENOUS 2 TIMES DAILY PRN
Status: DISCONTINUED | OUTPATIENT
Start: 2025-02-19 | End: 2025-02-19 | Stop reason: HOSPADM

## 2025-02-19 RX ORDER — HYDROCORTISONE 25 MG/G
1 CREAM TOPICAL AS NEEDED
Status: DISCONTINUED | OUTPATIENT
Start: 2025-02-19 | End: 2025-02-21 | Stop reason: HOSPADM

## 2025-02-19 RX ORDER — METHYLERGONOVINE MALEATE 0.2 MG/ML
200 INJECTION INTRAVENOUS ONCE AS NEEDED
Status: COMPLETED | OUTPATIENT
Start: 2025-02-19 | End: 2025-02-19

## 2025-02-19 RX ORDER — ROPIVACAINE HYDROCHLORIDE 2 MG/ML
INJECTION, SOLUTION EPIDURAL; INFILTRATION; PERINEURAL AS NEEDED
Status: DISCONTINUED | OUTPATIENT
Start: 2025-02-19 | End: 2025-02-19 | Stop reason: SURG

## 2025-02-19 RX ORDER — DIPHENHYDRAMINE HYDROCHLORIDE 50 MG/ML
12.5 INJECTION INTRAMUSCULAR; INTRAVENOUS EVERY 8 HOURS PRN
Status: DISCONTINUED | OUTPATIENT
Start: 2025-02-19 | End: 2025-02-19 | Stop reason: HOSPADM

## 2025-02-19 RX ORDER — OXYTOCIN/0.9 % SODIUM CHLORIDE 30/500 ML
2-20 PLASTIC BAG, INJECTION (ML) INTRAVENOUS
Status: DISCONTINUED | OUTPATIENT
Start: 2025-02-19 | End: 2025-02-19 | Stop reason: HOSPADM

## 2025-02-19 RX ORDER — LIDOCAINE HCL/EPINEPHRINE/PF 2%-1:200K
VIAL (ML) INJECTION AS NEEDED
Status: DISCONTINUED | OUTPATIENT
Start: 2025-02-19 | End: 2025-02-19 | Stop reason: SURG

## 2025-02-19 RX ORDER — MISOPROSTOL 200 UG/1
800 TABLET ORAL ONCE AS NEEDED
Status: COMPLETED | OUTPATIENT
Start: 2025-02-19 | End: 2025-02-19

## 2025-02-19 RX ORDER — OXYTOCIN/0.9 % SODIUM CHLORIDE 30/500 ML
999 PLASTIC BAG, INJECTION (ML) INTRAVENOUS ONCE
Status: COMPLETED | OUTPATIENT
Start: 2025-02-19 | End: 2025-02-19

## 2025-02-19 RX ORDER — TERBUTALINE SULFATE 1 MG/ML
0.25 INJECTION SUBCUTANEOUS AS NEEDED
Status: DISCONTINUED | OUTPATIENT
Start: 2025-02-19 | End: 2025-02-19 | Stop reason: HOSPADM

## 2025-02-19 RX ORDER — OXYTOCIN/0.9 % SODIUM CHLORIDE 30/500 ML
250 PLASTIC BAG, INJECTION (ML) INTRAVENOUS CONTINUOUS
Status: ACTIVE | OUTPATIENT
Start: 2025-02-19 | End: 2025-02-19

## 2025-02-19 RX ORDER — SODIUM CHLORIDE, SODIUM LACTATE, POTASSIUM CHLORIDE, CALCIUM CHLORIDE 600; 310; 30; 20 MG/100ML; MG/100ML; MG/100ML; MG/100ML
125 INJECTION, SOLUTION INTRAVENOUS CONTINUOUS
Status: ACTIVE | OUTPATIENT
Start: 2025-02-19 | End: 2025-02-19

## 2025-02-19 RX ORDER — ONDANSETRON 2 MG/ML
4 INJECTION INTRAMUSCULAR; INTRAVENOUS ONCE AS NEEDED
Status: DISCONTINUED | OUTPATIENT
Start: 2025-02-19 | End: 2025-02-19 | Stop reason: HOSPADM

## 2025-02-19 RX ORDER — BISACODYL 10 MG
10 SUPPOSITORY, RECTAL RECTAL DAILY PRN
Status: DISCONTINUED | OUTPATIENT
Start: 2025-02-20 | End: 2025-02-21 | Stop reason: HOSPADM

## 2025-02-19 RX ORDER — SODIUM CHLORIDE 0.9 % (FLUSH) 0.9 %
10 SYRINGE (ML) INJECTION AS NEEDED
Status: DISCONTINUED | OUTPATIENT
Start: 2025-02-19 | End: 2025-02-19 | Stop reason: HOSPADM

## 2025-02-19 RX ORDER — ACETAMINOPHEN 325 MG/1
650 TABLET ORAL EVERY 6 HOURS PRN
Status: DISCONTINUED | OUTPATIENT
Start: 2025-02-20 | End: 2025-02-21 | Stop reason: HOSPADM

## 2025-02-19 RX ORDER — SODIUM CHLORIDE 0.9 % (FLUSH) 0.9 %
10 SYRINGE (ML) INJECTION EVERY 12 HOURS SCHEDULED
Status: DISCONTINUED | OUTPATIENT
Start: 2025-02-20 | End: 2025-02-21 | Stop reason: HOSPADM

## 2025-02-19 RX ORDER — ACETAMINOPHEN 325 MG/1
650 TABLET ORAL EVERY 4 HOURS PRN
Status: DISCONTINUED | OUTPATIENT
Start: 2025-02-19 | End: 2025-02-19 | Stop reason: HOSPADM

## 2025-02-19 RX ORDER — FAMOTIDINE 20 MG/1
20 TABLET, FILM COATED ORAL 2 TIMES DAILY PRN
Status: DISCONTINUED | OUTPATIENT
Start: 2025-02-19 | End: 2025-02-19 | Stop reason: HOSPADM

## 2025-02-19 RX ADMIN — METHYLERGONOVINE MALEATE 200 MCG: 0.2 TABLET ORAL at 21:59

## 2025-02-19 RX ADMIN — MISOPROSTOL 800 MCG: 200 TABLET ORAL at 21:30

## 2025-02-19 RX ADMIN — LIDOCAINE HYDROCHLORIDE AND EPINEPHRINE 3 ML: 20; 5 INJECTION, SOLUTION EPIDURAL; INFILTRATION; INTRACAUDAL; PERINEURAL at 19:14

## 2025-02-19 RX ADMIN — ONDANSETRON 4 MG: 2 INJECTION, SOLUTION INTRAMUSCULAR; INTRAVENOUS at 20:17

## 2025-02-19 RX ADMIN — Medication 10 ML/HR: at 14:46

## 2025-02-19 RX ADMIN — LIDOCAINE HYDROCHLORIDE 4 ML: 10; .005 INJECTION, SOLUTION EPIDURAL; INFILTRATION; INTRACAUDAL; PERINEURAL at 14:44

## 2025-02-19 RX ADMIN — IBUPROFEN 600 MG: 600 TABLET, FILM COATED ORAL at 23:57

## 2025-02-19 RX ADMIN — HYDROCODONE BITARTRATE AND ACETAMINOPHEN 1 TABLET: 10; 325 TABLET ORAL at 23:57

## 2025-02-19 RX ADMIN — SODIUM CHLORIDE, POTASSIUM CHLORIDE, SODIUM LACTATE AND CALCIUM CHLORIDE 999 ML/HR: 600; 310; 30; 20 INJECTION, SOLUTION INTRAVENOUS at 19:06

## 2025-02-19 RX ADMIN — Medication 2 MILLI-UNITS/MIN: at 11:38

## 2025-02-19 RX ADMIN — SODIUM CHLORIDE, POTASSIUM CHLORIDE, SODIUM LACTATE AND CALCIUM CHLORIDE 125 ML/HR: 600; 310; 30; 20 INJECTION, SOLUTION INTRAVENOUS at 11:01

## 2025-02-19 RX ADMIN — METHYLERGONOVINE MALEATE 200 MCG: 0.2 INJECTION, SOLUTION INTRAMUSCULAR; INTRAVENOUS at 20:52

## 2025-02-19 RX ADMIN — Medication: at 23:57

## 2025-02-19 RX ADMIN — Medication 999 ML/HR: at 20:50

## 2025-02-19 RX ADMIN — SODIUM CHLORIDE, POTASSIUM CHLORIDE, SODIUM LACTATE AND CALCIUM CHLORIDE 999 ML/HR: 600; 310; 30; 20 INJECTION, SOLUTION INTRAVENOUS at 14:34

## 2025-02-19 RX ADMIN — ROPIVACAINE HYDROCHLORIDE 5 ML: 2 INJECTION, SOLUTION EPIDURAL; INFILTRATION at 14:45

## 2025-02-19 RX ADMIN — LIDOCAINE HYDROCHLORIDE AND EPINEPHRINE 7 ML: 20; 5 INJECTION, SOLUTION EPIDURAL; INFILTRATION; INTRACAUDAL; PERINEURAL at 19:21

## 2025-02-19 RX ADMIN — Medication 125 ML/HR: at 21:55

## 2025-02-19 NOTE — ANESTHESIA PROCEDURE NOTES
Labor Epidural    Pre-sedation assessment completed: 2/19/2025 2:38 PM    Patient reassessed immediately prior to procedure    Patient location during procedure: OB  Start Time: 2/19/2025 2:41 PM  Stop Time: 2/19/2025 2:46 PM  Performed By  Anesthesiologist: Irving Samson DO  Preanesthetic Checklist  Completed: patient identified, IV checked, site marked, risks and benefits discussed, surgical consent, monitors and equipment checked, pre-op evaluation and timeout performed  Prep:  Pt Position:sitting  Sterile Tech:cap, gloves, mask and sterile barrier  Prep:chlorhexidine gluconate and isopropyl alcohol  Monitoring:blood pressure monitoring, continuous pulse oximetry and EKG  Epidural Block Procedure:  Approach:midline  Guidance:landmark technique and palpation technique  Location:L3-L4  Needle Type:Tuohy  Needle Gauge:17 G  Loss of Resistance Medium: air  Loss of Resistance: 7cm  Cath Depth at skin:13 cm  Paresthesia: none  Aspiration:negative  Test Dose:negative  Number of Attempts: 1  Post Assessment:  Dressing:biopatch applied, occlusive dressing applied and secured with tape  Pt Tolerance:patient tolerated the procedure well with no apparent complications  Complications:no

## 2025-02-19 NOTE — ANESTHESIA PREPROCEDURE EVALUATION
Anesthesia Evaluation     Patient summary reviewed   no history of anesthetic complications:                Airway   Mallampati: II  TM distance: >3 FB  Neck ROM: full  No difficulty expected  Dental      Pulmonary     breath sounds clear to auscultation  (-) asthma, shortness of breath, recent URI  Cardiovascular   Exercise tolerance: good (4-7 METS)    Rhythm: regular  Rate: normal    (-) past MI, dysrhythmias, angina      Neuro/Psych  (-) seizures, CVA  GI/Hepatic/Renal/Endo    (-) GERD, liver disease, no renal disease, diabetes    Musculoskeletal     (-) neck stiffness  Abdominal    Substance History      OB/GYN    (+) Pregnant  (-) Preeclampsia        Other   blood dyscrasia (Hb 10.9, Plt 207) anemia,                 Anesthesia Plan    ASA 2     epidural     (IUP 40w1d)    Anesthetic plan, risks, benefits, and alternatives have been provided, discussed and informed consent has been obtained with: patient.    CODE STATUS:    Level Of Support Discussed With: Patient  Code Status (Patient has no pulse and is not breathing): CPR (Attempt to Resuscitate)  Medical Interventions (Patient has pulse or is breathing): Full Support

## 2025-02-19 NOTE — H&P
[facility]  Obstetric History and Physical    Chief Complaint   Patient presents with    Scheduled Induction     IOL term Pitocin, +FM, -LF, -VB       Subjective     Patient is a 24 y.o. female  currently at 40w1d, who presents for elective induction of labor secondary to postdates pregnancy.  Denies contractions, loss of fluid or vaginal bleeding.  Reports good fetal movement.    Her prenatal care is complicated by previous shoulder dystocia.  Her previous obstetric/gynecological history is noted for previous shoulder dystocia..    The following portions of the patients history were reviewed and updated as appropriate: current medications, allergies, past medical history, past surgical history, past family history, past social history, and problem list .       Prenatal Information:  Prenatal Results       Initial Prenatal Labs       Test Value Reference Range Date Time    Hemoglobin  12.6 g/dL 11.1 - 15.9 24 1207       12.2 g/dL 12.0 - 15.9 24 1546    Hematocrit  37.3 % 34.0 - 46.6 24 1207       37.0 % 34.0 - 46.6 24 1546    Platelets  255 x10E3/uL 150 - 450 24 1207       243 10*3/mm3 140 - 450 24 1546    Rubella IgG  14.10 index Immune >0.99 24 1207    Hepatitis B SAg  Negative  Negative 24 1207    Hepatitis C Ab  Non Reactive  Non Reactive 24 1207    RPR        T. Pallidum Ab   Non Reactive  Non Reactive 24 0945       Non Reactive  Non Reactive 24 1207    ABO  A   24 1207    Rh  Negative   24 1207    Antibody Screen  Negative  Negative 24 1207    HIV  Non Reactive  Non Reactive 24 1207    Urine Culture  Final report   24 1207    Gonorrhea  Negative  Negative 24        Negative  Negative 24 1157    Chlamydia  Negative  Negative 24        Negative  Negative 24 1157    TSH        HgB A1c   5.4 % 4.8 - 5.6 24 1207    Varicella IgG  681 index Immune >165 24 1207     Hemoglobinopathy Fractionation  Comment   03/16/22 1501    Hemoglobinopathy (genetic testing)        Cystic fibrosis         Spinal muscular atrophy        Fragile X                  Fetal testing        Test Value Reference Range Date Time    NIPT        MSAFP        AFP-4                  2nd and 3rd Trimester       Test Value Reference Range Date Time    Hemoglobin (repeated)  9.6 g/dL 12.0 - 15.9 02/07/25 1235       8.9 g/dL 12.0 - 15.9 01/31/25 1415       9.8 g/dL 12.0 - 15.9 11/25/24 0945    Hematocrit (repeated)  29.8 % 34.0 - 46.6 02/07/25 1235       28.4 % 34.0 - 46.6 01/31/25 1415       31.2 % 34.0 - 46.6 11/25/24 0945    Platelets   232 10*3/mm3 140 - 450 02/07/25 1235       199 10*3/mm3 140 - 450 01/31/25 1415       218 10*3/mm3 140 - 450 11/25/24 0945       255 x10E3/uL 150 - 450 07/12/24 1207       243 10*3/mm3 140 - 450 07/09/24 1546    1 hour GTT   75 mg/dL 65 - 139 11/25/24 0945    Antibody Screen (repeated)  Negative  Negative 11/25/24 0945    3rd TM syphilis scrn (repeated)  RPR         3rd TM syphilis scrn (repeated) TP-Ab  Non Reactive  Non Reactive 11/25/24 0945    3rd TM syphilis screen TB-Ab (FTA)  Non Reactive  Non Reactive 11/25/24 0945    Syphilis cascade test TP-Ab (EIA)        Syphilis cascade TPPA        GTT Fasting        GTT 1 Hr        GTT 2 Hr        GTT 3 Hr        Group B Strep  Negative  Negative 01/20/25               Other testing        Test Value Reference Range Date Time    Parvo IgG         CMV IgG                   Drug Screening       Test Value Reference Range Date Time    Amphetamine Screen  Negative ng/mL Rdsbav=1466 07/12/24 1157    Barbiturate Screen  Negative ng/mL Dgxeqd=774 07/12/24 1157    Benzodiazepine Screen  Negative ng/mL Mdmgxz=081 07/12/24 1157    Methadone Screen  Negative ng/mL Mgegwn=544 07/12/24 1157    Phencyclidine Screen  Negative ng/mL Cutoff=25 07/12/24 1157    Opiates Screen  Negative ng/mL Kjzweo=113 07/12/24 1157    THC Screen  Positive   Cutoff=50 07/12/24 1157    Cocaine Screen  Negative ng/mL Hogoun=703 07/12/24 1157    Propoxyphene Screen  Negative ng/mL Kdmthi=055 07/12/24 1157    Buprenorphine Screen        Methamphetamine Screen        Oxycodone Screen        Tricyclic Antidepressants Screen                  Legend    ^: Historical                          External Prenatal Results       Pregnancy Outside Results - Transcribed From Office Records - See Scanned Records For Details       Test Value Date Time    ABO  A  07/12/24 1207    Rh  Negative  07/12/24 1207    Antibody Screen  Negative  11/25/24 0945       Negative  07/12/24 1207    Varicella IgG  681 index 07/12/24 1207    Rubella  14.10 index 07/12/24 1207    Hgb  9.6 g/dL 02/07/25 1235       8.9 g/dL 01/31/25 1415       9.8 g/dL 11/25/24 0945       12.6 g/dL 07/12/24 1207       12.2 g/dL 07/09/24 1546    Hct  29.8 % 02/07/25 1235       28.4 % 01/31/25 1415       31.2 % 11/25/24 0945       37.3 % 07/12/24 1207       37.0 % 07/09/24 1546    HgB A1c   5.4 % 07/12/24 1207    1h GTT  75 mg/dL 11/25/24 0945    3h GTT Fasting       3h GTT 1 hour       3h GTT 2 hour       3h GTT 3 hour        Gonorrhea (discrete)  Negative  08/23/24        Negative  07/12/24 1157    Chlamydia (discrete)  Negative  08/23/24        Negative  07/12/24 1157    RPR       Syphils cascade: TP-Ab (FTA)  Non Reactive  11/25/24 0945       Non Reactive  07/12/24 1207    TP-Ab  Non Reactive  11/25/24 0945       Non Reactive  07/12/24 1207    TP-Ab (EIA)       TPPA       HBsAg  Negative  07/12/24 1207    Herpes Simplex Virus PCR       Herpes Simplex VIrus Culture       HIV  Non Reactive  07/12/24 1207    Hep C RNA Quant PCR       Hep C Antibody  Non Reactive  07/12/24 1207    AFP       NIPT       Cystic Fibrosis (Brittany)       Cystic Fibroisis        Spinal Muscular atrophy       Fragile X       Group B Strep  Negative  01/20/25     GBS Susceptibility to Clindamycin       GBS Susceptibility to Erythromycin       Fetal  Fibronectin       Genetic Testing, Maternal Blood                 Drug Screening       Test Value Date Time    Urine Drug Screen       Amphetamine Screen  Negative ng/mL 24 1157    Barbiturate Screen  Negative ng/mL 24 1157    Benzodiazepine Screen  Negative ng/mL 24 1157    Methadone Screen  Negative ng/mL 24 1157    Phencyclidine Screen  Negative ng/mL 24 1157    Opiates Screen       THC Screen       Cocaine Screen       Propoxyphene Screen  Negative ng/mL 24 1157    Buprenorphine Screen       Methamphetamine Screen       Oxycodone Screen       Tricyclic Antidepressants Screen                 Legend    ^: Historical                             Past OB History:     OB History    Para Term  AB Living   3 1 1 0 0 1   SAB IAB Ectopic Molar Multiple Live Births   0 0 0 0 0 1      # Outcome Date GA Lbr Judson/2nd Weight Sex Type Anes PTL Lv   3 Current            2 Term 24 39w6d / 03:18 4110 g (9 lb 1 oz) M Vag-Spont EPI N UMESH      Birth Comments: ldr 5 panda      Complications: Shoulder Dystocia, Anesthetic Complications, Dysfunctional Labor, Postpartum Hemorrhage      Name: Luis      Apgar1: 7  Apgar5: 9   1                Obstetric Comments   24 - IUP at 8-3 weeks by L=US - BIPIN 25 - [facility]      24 - 19-3 weeks - Normal and completed anatomy - Normal Cl - F     24 - 27-6 weeks - EFW 24%, AC 24% - [facility] .   24 - 31-3 weeks - EFW 34%, AC 53% - [facility]    25 - 35-6 weeks - EFW 30%, AC 44% - [facility]        Past Medical History: Past Medical History:   Diagnosis Date    Trichomoniasis 2020    Urinary tract infection 24      Past Surgical History Past Surgical History:   Procedure Laterality Date    D & C WITH SUCTION N/A 10/25/2023    Procedure: DILATATION AND CURETTAGE WITH SUCTION;  Surgeon: Cristy Metz MD;  Location: [facility] OR;  Service: Obstetrics/Gynecology;  Laterality: N/A;      Family History: Family History    Problem Relation Age of Onset    Bipolar disorder Mother     ADD / ADHD Brother     Schizophrenia Maternal Uncle     Mental illness Maternal Grandmother     Hypertension Paternal Grandmother     Diabetes Paternal Grandmother     Breast cancer Neg Hx     Ovarian cancer Neg Hx     Uterine cancer Neg Hx     Colon cancer Neg Hx     Malig Hyperthermia Neg Hx       Social History:  reports that she has quit smoking. Her smoking use included cigars and cigarettes. She has a 1 pack-year smoking history. She has never used smokeless tobacco.   reports that she does not currently use alcohol after a past usage of about 1.0 standard drink of alcohol per week.   reports that she does not currently use drugs.        Review of Systems   Constitutional:  Negative for chills, fatigue and fever.   Gastrointestinal:  Negative for abdominal pain.   Genitourinary:  Negative for dysuria, menstrual problem, pelvic pain, vaginal bleeding, vaginal discharge and vaginal pain.   All other systems reviewed and are negative.        Objective     Vital Signs Range for the last 24 hours  Temperature: Temp:  [98.6 °F (37 °C)] 98.6 °F (37 °C)   Temp Source: Temp src: Oral   BP: BP: (109)/(64) 109/64   Pulse: Heart Rate:  [105] 105   Respirations: Resp:  [16] 16   SPO2: SpO2:  [100 %] 100 %   O2 Amount (l/min):     O2 Devices Device (Oxygen Therapy): room air   Weight: Weight:  [86.9 kg (191 lb 9.6 oz)] 86.9 kg (191 lb 9.6 oz)     Physical Examination: General appearance - alert, well appearing, and in no distress  Abdomen - soft, nontender, nondistended, no masses or organomegaly  Musculoskeletal - no joint tenderness, deformity or swelling  Extremities - peripheral pulses normal, no pedal edema, no clubbing or cyanosis  Skin - normal coloration and turgor, no rashes, no suspicious skin lesions noted    Presentation: Vertex    Cervix: Exam by:     Dilation: Cervical Dilation (cm): 3   Effacement: Cervical Effacement: 60   Station: Fetal Station:  1-->-2     Fetal Heart Rate Assessment   Method:     Beats/min:     Baseline:     Variability:     Accels:     Decels:     Tracing Category:       Uterine Assessment   Method:     Frequency (min):     Ctx Count in 10 min:     Duration:     Intensity:     Intensity by IUPC:     Resting Tone:     Resting Tone by IUPC:     Eastern Units:       Laboratory Results:   Results from last 7 days   Lab Units 25  1056   WBC 10*3/mm3 8.59   HEMOGLOBIN g/dL 10.8*   HEMATOCRIT % 35.7   PLATELETS 10*3/mm3 207         Assessment & Plan       Post-dates pregnancy    Rh negative, antepartum    History of shoulder dystocia in prior pregnancy, currently pregnant    Maternal anemia in pregnancy, antepartum      Assessment & Plan    Assessment:  1.  Intrauterine pregnancy at 40w1d gestation with reactive, reassuring fetal status.    2.  induction of labor  for approaching postdates  with favorable cervix  3.  Obstetrical history significant for is non-contributory.  4.  GBS status:   Strep Gp B Culture   Date Value Ref Range Status   2025 Negative Negative Final     Comment:     Centers for Disease Control and Prevention (CDC) and American Congress  of Obstetricians and Gynecologists (ACOG) guidelines for prevention of   group B streptococcal (GBS) disease specify co-collection of  a vaginal and rectal swab specimen to maximize sensitivity of GBS  detection. Per the CDC and ACOG, swabbing both the lower vagina and  rectum substantially increases the yield of detection compared with  sampling the vagina alone.  Penicillin G, ampicillin, or cefazolin are indicated for intrapartum  prophylaxis of  GBS colonization. Reflex susceptibility  testing should be performed prior to use of clindamycin only on GBS  isolates from penicillin-allergic women who are considered a high risk  for anaphylaxis. Treatment with vancomycin without additional testing  is warranted if resistance to clindamycin is noted.          Plan:  1. Vaginal anticipated  2. Plan of care has been reviewed with patient and .   3.  Risks, benefits of treatment plan have been discussed.  4.  All questions have been answered.        Cristy Metz MD  2/19/2025  11:33 EST

## 2025-02-19 NOTE — PLAN OF CARE
Problem: Adult Inpatient Plan of Care  Goal: Plan of Care Review  Outcome: Progressing  Flowsheets (Taken 2/19/2025 1709)  Progress: improving  Outcome Evaluation: Patient an IOL with pitocin. Patient currently on pitocin and is comfortable with epidural.  Plan of Care Reviewed With:   patient   significant other  Goal: Patient-Specific Goal (Individualized)  Outcome: Progressing  Flowsheets (Taken 2/19/2025 1709)  Patient/Family-Specific Goals (Include Timeframe): Healthy mom and baby by discharge  Individualized Care Needs: Epidural, MARIA T, Breastfeeding  Anxieties, Fears or Concerns: None noted currently  Goal: Absence of Hospital-Acquired Illness or Injury  Outcome: Progressing  Goal: Optimal Comfort and Wellbeing  Outcome: Progressing  Intervention: Provide Person-Centered Care  Recent Flowsheet Documentation  Taken 2/19/2025 1201 by Rachael Martinez RN  Trust Relationship/Rapport:   choices provided   care explained   emotional support provided   empathic listening provided   questions answered   questions encouraged   reassurance provided   thoughts/feelings acknowledged  Goal: Readiness for Transition of Care  Outcome: Progressing  Intervention: Mutually Develop Transition Plan  Recent Flowsheet Documentation  Taken 2/19/2025 1105 by Rachael Martinez, RN  Equipment Currently Used at Home: none  Taken 2/19/2025 1103 by Rachael Martinez, RN  Equipment Needed After Discharge: none  Equipment Currently Used at Home: none  Anticipated Changes Related to Illness: none  Transportation Anticipated:   car, drives self   family or friend will provide  Transportation Concerns: none  Concerns to be Addressed: no discharge needs identified  Readmission Within the Last 30 Days: no previous admission in last 30 days  Patient/Family Anticipated Services at Transition: none  Patient/Family Anticipates Transition to:   home   home with family   Goal Outcome Evaluation:  Plan of Care Reviewed With: patient, significant other         Progress: improving  Outcome Evaluation: Patient an IOL with pitocin. Patient currently on pitocin and is comfortable with epidural.                              SBO

## 2025-02-20 ENCOUNTER — PATIENT OUTREACH (OUTPATIENT)
Dept: LABOR AND DELIVERY | Facility: HOSPITAL | Age: 25
End: 2025-02-20
Payer: COMMERCIAL

## 2025-02-20 LAB
BASOPHILS # BLD AUTO: 0.01 10*3/MM3 (ref 0–0.2)
BASOPHILS NFR BLD AUTO: 0.1 % (ref 0–1.5)
DEPRECATED RDW RBC AUTO: 57.7 FL (ref 37–54)
EOSINOPHIL # BLD AUTO: 0.05 10*3/MM3 (ref 0–0.4)
EOSINOPHIL NFR BLD AUTO: 0.5 % (ref 0.3–6.2)
ERYTHROCYTE [DISTWIDTH] IN BLOOD BY AUTOMATED COUNT: 21.2 % (ref 12.3–15.4)
HCT VFR BLD AUTO: 29.7 % (ref 34–46.6)
HGB BLD-MCNC: 9.7 G/DL (ref 12–15.9)
IMM GRANULOCYTES # BLD AUTO: 0.05 10*3/MM3 (ref 0–0.05)
IMM GRANULOCYTES NFR BLD AUTO: 0.5 % (ref 0–0.5)
LYMPHOCYTES # BLD AUTO: 1.82 10*3/MM3 (ref 0.7–3.1)
LYMPHOCYTES NFR BLD AUTO: 16.5 % (ref 19.6–45.3)
MCH RBC QN AUTO: 25.7 PG (ref 26.6–33)
MCHC RBC AUTO-ENTMCNC: 32.7 G/DL (ref 31.5–35.7)
MCV RBC AUTO: 78.8 FL (ref 79–97)
MONOCYTES # BLD AUTO: 1.08 10*3/MM3 (ref 0.1–0.9)
MONOCYTES NFR BLD AUTO: 9.8 % (ref 5–12)
NEUTROPHILS NFR BLD AUTO: 72.6 % (ref 42.7–76)
NEUTROPHILS NFR BLD AUTO: 8.04 10*3/MM3 (ref 1.7–7)
NRBC BLD AUTO-RTO: 0 /100 WBC (ref 0–0.2)
PLATELET # BLD AUTO: 166 10*3/MM3 (ref 140–450)
PMV BLD AUTO: 11.3 FL (ref 6–12)
RBC # BLD AUTO: 3.77 10*6/MM3 (ref 3.77–5.28)
WBC NRBC COR # BLD AUTO: 11.05 10*3/MM3 (ref 3.4–10.8)

## 2025-02-20 PROCEDURE — 85025 COMPLETE CBC W/AUTO DIFF WBC: CPT | Performed by: OBSTETRICS & GYNECOLOGY

## 2025-02-20 PROCEDURE — 3E0234Z INTRODUCTION OF SERUM, TOXOID AND VACCINE INTO MUSCLE, PERCUTANEOUS APPROACH: ICD-10-PCS | Performed by: OBSTETRICS & GYNECOLOGY

## 2025-02-20 PROCEDURE — 25010000002 RHO D IMMUNE GLOBULIN 1500 UNIT/2ML SOLUTION PREFILLED SYRINGE: Performed by: OBSTETRICS & GYNECOLOGY

## 2025-02-20 RX ADMIN — METHYLERGONOVINE MALEATE 200 MCG: 0.2 TABLET ORAL at 06:13

## 2025-02-20 RX ADMIN — IBUPROFEN 600 MG: 600 TABLET, FILM COATED ORAL at 17:01

## 2025-02-20 RX ADMIN — HYDROCODONE BITARTRATE AND ACETAMINOPHEN 1 TABLET: 5; 325 TABLET ORAL at 08:29

## 2025-02-20 RX ADMIN — DOCUSATE SODIUM 100 MG: 100 CAPSULE, LIQUID FILLED ORAL at 20:33

## 2025-02-20 RX ADMIN — IBUPROFEN 600 MG: 600 TABLET, FILM COATED ORAL at 06:13

## 2025-02-20 RX ADMIN — DOCUSATE SODIUM 100 MG: 100 CAPSULE, LIQUID FILLED ORAL at 08:29

## 2025-02-20 RX ADMIN — HYDROCODONE BITARTRATE AND ACETAMINOPHEN 1 TABLET: 10; 325 TABLET ORAL at 04:09

## 2025-02-20 RX ADMIN — METHYLERGONOVINE MALEATE 200 MCG: 0.2 TABLET ORAL at 18:41

## 2025-02-20 RX ADMIN — HUMAN RHO(D) IMMUNE GLOBULIN 1500 UNITS: 1500 SOLUTION INTRAMUSCULAR; INTRAVENOUS at 04:10

## 2025-02-20 RX ADMIN — METHYLERGONOVINE MALEATE 200 MCG: 0.2 TABLET ORAL at 13:32

## 2025-02-20 RX ADMIN — HYDROCODONE BITARTRATE AND ACETAMINOPHEN 1 TABLET: 10; 325 TABLET ORAL at 23:54

## 2025-02-20 NOTE — LACTATION NOTE
This note was copied from a baby's chart.  P2 term baby, 12hrs old. Mom reports breast feeding is going well so far and baby has had one wet and stool diaper. She breast fed her first baby for one year and has personal breast pump.   Reviewed milk production, how to know baby is getting enough milk and encouraged to call for any assistance or questions.

## 2025-02-20 NOTE — PROGRESS NOTES
"Discharge Planning Assessment  [facility]     Patient Name: Paulette Martinez  MRN: [id number]  Today's Date: 2/20/2025    Admit Date: 2/19/2025    Plan: Infant may discharge to mother when medically ready; CSW will follow cord tox. JOBY Go.   Discharge Needs Assessment    No documentation.                  Discharge Plan       Row Name 02/20/25 1123       Plan    Plan Infant may discharge to mother when medically ready; CSW will follow cord tox. JOBY Go.    Plan Comments Mother: Paulette Martinez, MRN: [id number]; infant: Emily \"Aliyana\" Michelle, MRN: [id number]. CSW consulted for \"positive THC during pregnancy.\" Of note, mother's UDS was positive for THC prenatally on 7/10. Mother's UDS was not collected on admit. Infant's UDS was missed; cord toxicology sent. CSW met with mother alone at bedside. Mother verified address, phone number, and insurance. Mother reports [employer] has not spoken to her about adding infant to health insurance. Mother reports having a car seat, crib/bassinet, clothes, and diapers for infant. Mother has one other child: 2yr old, who is being cared for by paternal grandma during hospital stay. Mother reports, father of infant is available for support as needed. Mother reports infant is following up with [facility] after discharge; mother is comfortable scheduling appointments for infant and has reliable transportation. Mother is current with WIC benefits. CSW consulted the hospital WIC rep regarding adding infant onto mother's WIC plan. Mother denies any violence, threats, or feeling unsafe at home or with FOB. Mother is a part of the Motherhood Connection program. CSW provided mother with a packet of resources including: WIC, HANDS, transportation, infant supplies, counseling, online support groups, postpartum mood and anxiety resources, and general community resources. CSW spent time building rapport with mother, and offered validation, support, and encouragement to " mother throughout assessment. Mother was polite and appropriate, and denied having unmet needs or concerns at this time. CSW will follow cord toxicology and complete mandated reporting to CPS if warranted. JOBY Go.                  Continued Care and Services - Admitted Since 2/19/2025    No active coordination exists for this encounter.       Selected Continued Care - Episodes Includes continued care and service providers with selected services from the active episodes listed below      Motherhood Connection Episode start date: 8/22/2024   There are no active outsourced providers for this episode.                    Demographic Summary       Row Name 02/20/25 1122       General Information    Admission Type inpatient    Arrived From home    Referral Source nursing    Reason for Consult substance use concerns    General Information Comments Positive THC during pregnancy                   Functional Status       Row Name 02/20/25 1122       Functional Status, IADL    Medications independent    Meal Preparation independent    Housekeeping independent    Laundry independent    Shopping independent       Mental Status    General Appearance WDL WDL       Mental Status Summary    Recent Changes in Mental Status/Cognitive Functioning no changes       Employment/    Employment Status employed full-time    Current or Previous Occupation healthcare    Employment/ Comments [employer]                   Psychosocial       Row Name 02/20/25 1123       Behavior WDL    Behavior WDL WDL       Emotion Mood WDL    Emotion/Mood/Affect WDL WDL       Speech WDL    Speech WDL WDL       Perceptual State WDL    Perceptual State WDL WDL       Thought Process WDL    Thought Process WDL WDL       Intellectual Performance WDL    Intellectual Performance WDL WDL       Coping/Stress    Major Change/Loss/Stressor birth    Patient Personal Strengths future/goal oriented;motivated;positive attitude    Sources of  Support significant other;community support                   Abuse/Neglect       Row Name 02/20/25 1123       Personal Safety    Feels Unsafe at Home or Work/School no    Feels Threatened by Someone no    Does Anyone Try to Keep You From Having Contact with Others or Doing Things Outside Your Home? no    Physical Signs of Abuse Present no                   Legal    No documentation.                  Substance Abuse       Row Name 02/20/25 1123       Substance Use    Substance Use Comment No UDS mom or infant; cord tox sent.                   Patient Forms    No documentation.                     FADY George

## 2025-02-20 NOTE — PROGRESS NOTES
Postpartum  Day#1    Subjective:    Chief Complaint   Patient presents with    Scheduled Induction     IOL term Pitocin, +FM, -LF, -VB     Pt doing well. Pain well controlled. Lochia normal. Ambulating well. Tolerating regular diet. Voiding without difficulty. No complaints    OB History    Para Term  AB Living   3 2 2 0 0 2   SAB IAB Ectopic Molar Multiple Live Births   0 0 0 0 0 2      # Outcome Date GA Lbr Judson/2nd Weight Sex Type Anes PTL Lv   3 Term 25 40w1d 04:48 / 00:26 3900 g (8 lb 9.6 oz) F Vag-Spont EPI N UMESH      Birth Comments: LR 3 Panda   2 Term 22 39w6d / 03:18 4110 g (9 lb 1 oz) M Vag-Spont EPI N UMESH      Birth Comments: ldr 5 panda      Complications: Shoulder Dystocia, Anesthetic Complications, Dysfunctional Labor, Postpartum Hemorrhage   1                Obstetric Comments   24 - IUP at 8-3 weeks by L=US - BIPIN 25 - J[facility]      24 - 19-3 weeks - Normal and completed anatomy - Normal Cl - [facility]     24 - 27-6 weeks - EFW 24%, AC 24% - [facility] .   24 - 31-3 weeks - EFW 34%, AC 53% - [facility]    25 - 35-6 weeks - EFW 30%, AC 44% - [facility]         Vitals:   Wt Readings from Last 3 Encounters:   25 86.9 kg (191 lb 9.6 oz)   25 85.6 kg (188 lb 12.8 oz)   02/10/25 85.3 kg (188 lb)     Temp Readings from Last 3 Encounters:   25 97.8 °F (36.6 °C) (Oral)   25 98 °F (36.7 °C) (Oral)   25 98.4 °F (36.9 °C) (Oral)     BP Readings from Last 3 Encounters:   25 100/66   25 118/72   02/10/25 120/80     Pulse Readings from Last 3 Encounters:   25 66   25 104   25 97   ROS:      ROS:Pulm: neg for soa  GI: neg for heavy bleeding              Musculoskel: neg for leg pain        LABS:  Lab Results (last 24 hours)       Procedure Component Value Units Date/Time    CBC & Differential [[id number]]  (Abnormal) Collected: 25    Specimen: Blood Updated: 25    Narrative:      The  following orders were created for panel order CBC & Differential.  Procedure                               Abnormality         Status                     ---------                               -----------         ------                     CBC Auto Differential[[id number]]        Abnormal            Final result                 Please view results for these tests on the individual orders.    CBC Auto Differential [[id number]]  (Abnormal) Collected: 02/20/25 0747    Specimen: Blood Updated: 02/20/25 0833     WBC 11.05 10*3/mm3      RBC 3.77 10*6/mm3      Hemoglobin 9.7 g/dL      Hematocrit 29.7 %      MCV 78.8 fL      MCH 25.7 pg      MCHC 32.7 g/dL      RDW 21.2 %      RDW-SD 57.7 fl      MPV 11.3 fL      Platelets 166 10*3/mm3      Neutrophil % 72.6 %      Lymphocyte % 16.5 %      Monocyte % 9.8 %      Eosinophil % 0.5 %      Basophil % 0.1 %      Immature Grans % 0.5 %      Neutrophils, Absolute 8.04 10*3/mm3      Lymphocytes, Absolute 1.82 10*3/mm3      Monocytes, Absolute 1.08 10*3/mm3      Eosinophils, Absolute 0.05 10*3/mm3      Basophils, Absolute 0.01 10*3/mm3      Immature Grans, Absolute 0.05 10*3/mm3      nRBC 0.0 /100 WBC     Treponema pallidum AB w/Reflex RPR [[id number]]  (Normal) Collected: 02/19/25 1056    Specimen: Blood Updated: 02/19/25 1157     Treponemal AB Total Non-Reactive    Narrative:      Reactive results will reflex RPR testing.    Comprehensive Metabolic Panel [[id number]]  (Abnormal) Collected: 02/19/25 1056    Specimen: Blood Updated: 02/19/25 1154     Glucose 91 mg/dL      BUN 5 mg/dL      Creatinine 0.49 mg/dL      Sodium 138 mmol/L      Potassium 3.9 mmol/L      Chloride 105 mmol/L      CO2 20.7 mmol/L      Calcium 9.1 mg/dL      Total Protein 6.9 g/dL      Albumin 3.4 g/dL      ALT (SGPT) 12 U/L      AST (SGOT) 15 U/L      Alkaline Phosphatase 188 U/L      Total Bilirubin 0.2 mg/dL      Globulin 3.5 gm/dL      A/G Ratio 1.0 g/dL      BUN/Creatinine Ratio 10.2     Anion Gap 12.3 mmol/L       eGFR 136.9 mL/min/1.73     Narrative:      GFR Categories in Chronic Kidney Disease (CKD)      GFR Category          GFR (mL/min/1.73)    Interpretation  G1                     90 or greater         Normal or high (1)  G2                      60-89                Mild decrease (1)  G3a                   45-59                Mild to moderate decrease  G3b                   30-44                Moderate to severe decrease  G4                    15-29                Severe decrease  G5                    14 or less           Kidney failure          (1)In the absence of evidence of kidney disease, neither GFR category G1 or G2 fulfill the criteria for CKD.    eGFR calculation 2021 CKD-EPI creatinine equation, which does not include race as a factor    CBC & Differential [[id number]]  (Abnormal) Collected: 02/19/25 1056    Specimen: Blood Updated: 02/19/25 1130    Narrative:      The following orders were created for panel order CBC & Differential.  Procedure                               Abnormality         Status                     ---------                               -----------         ------                     CBC Auto Differential[[id number]]        Abnormal            Final result                 Please view results for these tests on the individual orders.    CBC Auto Differential [[id number]]  (Abnormal) Collected: 02/19/25 1056    Specimen: Blood Updated: 02/19/25 1130     WBC 8.59 10*3/mm3      RBC 4.43 10*6/mm3      Hemoglobin 10.8 g/dL      Hematocrit 35.7 %      MCV 80.6 fL      MCH 24.4 pg      MCHC 30.3 g/dL      RDW 21.6 %      RDW-SD 61.4 fl      MPV 10.6 fL      Platelets 207 10*3/mm3      Neutrophil % 72.1 %      Lymphocyte % 18.9 %      Monocyte % 8.3 %      Eosinophil % 0.3 %      Basophil % 0.1 %      Immature Grans % 0.3 %      Neutrophils, Absolute 6.19 10*3/mm3      Lymphocytes, Absolute 1.62 10*3/mm3      Monocytes, Absolute 0.71 10*3/mm3      Eosinophils, Absolute 0.03 10*3/mm3       Basophils, Absolute 0.01 10*3/mm3      Immature Grans, Absolute 0.03 10*3/mm3      nRBC 0.0 /100 WBC             Exam:   Gen: NAD, cooperative, conversive  Abd: Soft, nondistended, fundus is firm below umbilicus, approp tender  Ext: Nontender bilaterally  Lochia normal        Principal Problem:    Post-dates pregnancy  Active Problems:    Rh negative, antepartum      Overview: ABO / Rh (2024 12:07) Blood type: A negative    History of shoulder dystocia in prior pregnancy, currently pregnant    Maternal anemia in pregnancy, antepartum    Vaginal delivery       Assessment::   Paulette Martinez is a 24 y.o. female  s/p Vaginal, Spontaneous    There are no diagnoses linked to this encounter.     1. PPD#1 , Doing well,   2. Precautions given  3. Routine post partum  care discussed  4. Ambulation encouraged.         cat Rust MD     2025 09:30 EST

## 2025-02-20 NOTE — PLAN OF CARE
Goal Outcome Evaluation:  Plan of Care Reviewed With: patient, significant other        Progress: improving  Outcome Evaluation: vitals stable, assessment wdl, pt resting, pain controlled per po meds, breast and bottlefeeding, bonding well with baby

## 2025-02-20 NOTE — PLAN OF CARE
Goal Outcome Evaluation:  Plan of Care Reviewed With: patient        Progress: improving     VSS, fundus and lochia WDL, voiding without difficulty, pain control with PO meds, working on breastfeeding  Problem: Adult Inpatient Plan of Care  Goal: Plan of Care Review  Outcome: Progressing  Flowsheets (Taken 2/20/2025 0519)  Progress: improving  Plan of Care Reviewed With: patient  Goal: Patient-Specific Goal (Individualized)  Outcome: Progressing  Goal: Absence of Hospital-Acquired Illness or Injury  Outcome: Progressing  Intervention: Identify and Manage Fall Risk  Recent Flowsheet Documentation  Taken 2/20/2025 0500 by Claire Ingram RN  Safety Promotion/Fall Prevention: safety round/check completed  Taken 2/20/2025 0409 by Claire Ingram RN  Safety Promotion/Fall Prevention: safety round/check completed  Taken 2/20/2025 0232 by Claire Ingram RN  Safety Promotion/Fall Prevention: safety round/check completed  Taken 2/20/2025 0048 by Claire Ingram RN  Safety Promotion/Fall Prevention: safety round/check completed  Taken 2/19/2025 2349 by Claire Ingram RN  Safety Promotion/Fall Prevention: safety round/check completed  Taken 2/19/2025 2317 by Claire Ingram RN  Safety Promotion/Fall Prevention: safety round/check completed  Goal: Optimal Comfort and Wellbeing  Outcome: Progressing  Intervention: Monitor Pain and Promote Comfort  Recent Flowsheet Documentation  Taken 2/20/2025 0409 by Claire Ingram RN  Pain Management Interventions: pain medication given  Taken 2/20/2025 0048 by Claire Ingram RN  Pain Management Interventions:   unnecessary movement minimized   care clustered  Taken 2/19/2025 2349 by Claire Ingram RN  Pain Management Interventions:   care clustered   unnecessary movement minimized   pain management plan reviewed with patient/caregiver  Taken 2/19/2025 2317 by Claier Ingram RN  Pain Management Interventions:   pain management plan reviewed with patient/caregiver   unnecessary  movement minimized   care clustered  Intervention: Provide Person-Centered Care  Recent Flowsheet Documentation  Taken 2025 2317 by Claire Ingram RN  Trust Relationship/Rapport:   care explained   choices provided   questions answered   questions encouraged   thoughts/feelings acknowledged  Goal: Readiness for Transition of Care  Outcome: Progressing     Problem: Labor  Goal: Hemostasis  Outcome: Progressing  Goal: Stable Fetal Wellbeing  Outcome: Progressing  Goal: Effective Progression to Delivery  Outcome: Progressing  Goal: Absence of Infection Signs and Symptoms  Outcome: Progressing  Goal: Acceptable Pain Control  Outcome: Progressing  Goal: Normal Uterine Contraction Pattern  Outcome: Progressing     Problem: Fall Injury Risk  Goal: Absence of Fall and Fall-Related Injury  Outcome: Progressing  Intervention: Promote Injury-Free Environment  Recent Flowsheet Documentation  Taken 2025 0500 by Claire Ingram RN  Safety Promotion/Fall Prevention: safety round/check completed  Taken 2025 0409 by Claire Ingram RN  Safety Promotion/Fall Prevention: safety round/check completed  Taken 2025 0232 by Claire Ingram RN  Safety Promotion/Fall Prevention: safety round/check completed  Taken 2025 0048 by Claire Ingram RN  Safety Promotion/Fall Prevention: safety round/check completed  Taken 2025 2349 by Claire Ingram RN  Safety Promotion/Fall Prevention: safety round/check completed  Taken 2025 2317 by Claire Ingram RN  Safety Promotion/Fall Prevention: safety round/check completed     Problem:  Fall Injury Risk  Goal: Absence of Fall, Infant Drop and Related Injury  Outcome: Progressing  Intervention: Promote Injury-Free Environment  Recent Flowsheet Documentation  Taken 2025 0500 by Claire Ingram RN  Safety Promotion/Fall Prevention: safety round/check completed  Taken 2025 0409 by Claire Ingram RN  Safety Promotion/Fall Prevention: safety  round/check completed  Taken 2/20/2025 0232 by Claire Ingram RN  Safety Promotion/Fall Prevention: safety round/check completed  Taken 2/20/2025 0048 by Claire Ingram RN  Safety Promotion/Fall Prevention: safety round/check completed  Taken 2/19/2025 2349 by Claire Ingram RN  Safety Promotion/Fall Prevention: safety round/check completed  Taken 2/19/2025 2317 by Claire Ingram RN  Safety Promotion/Fall Prevention: safety round/check completed     Problem: Anesthesia/Analgesia, Neuraxial  Goal: Safe, Effective Infusion Delivery  Outcome: Progressing  Goal: Stable Patient-Fetal Status  Outcome: Progressing  Goal: Absence of Infection Signs and Symptoms  Outcome: Progressing  Goal: Nausea and Vomiting Relief  Outcome: Progressing  Goal: Optimal Pain Control and Function  Outcome: Progressing  Intervention: Prevent or Manage Pain  Recent Flowsheet Documentation  Taken 2/20/2025 0409 by Claire Ingram RN  Pain Management Interventions: pain medication given  Taken 2/20/2025 0048 by Claire Ingram RN  Pain Management Interventions:   unnecessary movement minimized   care clustered  Taken 2/19/2025 2349 by Claire Ingram RN  Pain Management Interventions:   care clustered   unnecessary movement minimized   pain management plan reviewed with patient/caregiver  Taken 2/19/2025 2317 by Claire Ingram RN  Pain Management Interventions:   pain management plan reviewed with patient/caregiver   unnecessary movement minimized   care clustered  Goal: Effective Oxygenation and Ventilation  Outcome: Progressing  Goal: Baseline Motor Function Return  Outcome: Progressing  Intervention: Optimize Sensorimotor Function and Safety  Recent Flowsheet Documentation  Taken 2/20/2025 0500 by Claire Ingram RN  Safety Promotion/Fall Prevention: safety round/check completed  Taken 2/20/2025 0409 by Claire Ingram RN  Safety Promotion/Fall Prevention: safety round/check completed  Taken 2/20/2025 0232 by Claire Ingram  RN  Safety Promotion/Fall Prevention: safety round/check completed  Taken 2/20/2025 0048 by Claire Ingram RN  Safety Promotion/Fall Prevention: safety round/check completed  Taken 2/19/2025 2349 by Claire Ingram RN  Safety Promotion/Fall Prevention: safety round/check completed  Taken 2/19/2025 2317 by Claire Ingram RN  Safety Promotion/Fall Prevention: safety round/check completed  Goal: Effective Urinary Elimination  Outcome: Progressing     Problem: Skin Injury Risk Increased  Goal: Skin Health and Integrity  Outcome: Progressing  Intervention: Optimize Skin Protection  Recent Flowsheet Documentation  Taken 2/20/2025 0048 by Claire Ingram RN  Activity Management: ambulated to bathroom  Taken 2/19/2025 2317 by Claire Ingram RN  Activity Management: bedrest   .

## 2025-02-20 NOTE — ANESTHESIA PROCEDURE NOTES
Labor Epidural      Patient reassessed immediately prior to procedure    Patient location during procedure: OB  Start Time: 2/19/2025 7:02 PM  Stop Time: 2/19/2025 7:14 PM  Performed By  Anesthesiologist: Ravin Wang MD  Preanesthetic Checklist  Completed: patient identified, IV checked, site marked, risks and benefits discussed, surgical consent, monitors and equipment checked, pre-op evaluation and timeout performed  Prep:  Pt Position:sitting  Sterile Tech:cap, gloves, mask and sterile barrier  Prep:chlorhexidine gluconate and isopropyl alcohol  Monitoring:blood pressure monitoring, continuous pulse oximetry and EKG  Epidural Block Procedure:  Approach:midline  Guidance:landmark technique  Location:L2-L3  Needle Type:Tuohy  Needle Gauge:17 G  Loss of Resistance Medium: saline  Loss of Resistance: 7cm  Cath Depth at skin:12 cm  Paresthesia: none  Aspiration:negative  Test Dose:negative  Number of Attempts: 1  Post Assessment:  Dressing:occlusive dressing applied and secured with tape  Pt Tolerance:patient tolerated the procedure well with no apparent complications  Complications:no

## 2025-02-20 NOTE — L&D DELIVERY NOTE
[facility]   Vaginal Delivery Note    Patient Name: Paulette Martinez  : 2000  MRN: [id number]    Date of Delivery: 2025    Diagnosis     Pre & Post-Delivery:  Intrauterine pregnancy at 40w1d  Labor status: Induced Onset of Labor    Post-dates pregnancy    Rh negative, antepartum    History of shoulder dystocia in prior pregnancy, currently pregnant    Maternal anemia in pregnancy, antepartum    Vaginal delivery             Problem List    Transfer to Postpartum     Review the Delivery Report for details.     Delivery     Delivery: Vaginal, Spontaneous    YOB: 2023   Time of Birth:  Gestational Age 8:46 PM  40w1d     Anesthesia: Epidural    Delivering clinician: Cristy Metz   Forceps?   No   Vacuum? No    Shoulder dystocia present: No        Delivery narrative: Patient presented for elective induction of labor earlier today.  Pitocin was initiated and after she was comfortable with epidural, amniotomy was performed that returned clear fluid.  Patient progressed to complete and was set up to push and pushed with 3 contractions to deliver a live viable female infant in direct occiput anterior position that restituted to maternal right.  Body cord was reduced after delivery.  Infant was bulb suctioned and after delayed cord clamping, cord was cut and infant placed on mother's chest.  Placenta delivered spontaneously, intact with three-vessel cord.  Uterus was manually explored and 2 small pieces of membrane were retrieved.  Fundal massage and IV Pitocin were used for hemostasis.  Bladder was drained.  IM Methergine x 1 was given.  I was called back to the room approximately 45 minutes after delivery for an additional 359 cc of blood loss clots.  Patient was still comfortable with epidural so I was able to sweep clots from the lower uterine segment, which allowed the lower uterine segment to contract down.  Bleeding was then minimal.  800 mcg Cytotec placed per rectum.  Total  blood loss for delivery was 657 cc.       Infant     Findings: female infant     Infant observations: Weight: 3900 g (8 lb 9.6 oz)  Length: 20.5 in  Observations/Comments:  LR 3 Flavio     Apgars: 8  @ 1 minute /    9  @ 5 minutes   Infant Name: Erin     Placenta & Cord         Placenta delivered  Spontaneous at   2/19/2025  8:50 PM    Cord: 3 vessels present.   Nuchal Cord?  yes; Number of nuchal loops present:  1   Cord blood obtained: Yes   Cord gases obtained:  No     Repair     Episiotomy: None    No    Lacerations: No   Estimated Blood Loss:       Quantitative Blood Loss: Quantitative Blood Loss (mL): 134 mL (02/19/25 2130)        Complications     Delayed postpartum hemorrhage    Disposition     Mother to Remain in LD  in stable condition currently.  Baby to remains with mom  in stable condition currently.    Cristy Metz MD  02/19/25  22:40 EST

## 2025-02-20 NOTE — LACTATION NOTE
Mother describes  Fever,  Red ears on skin that are not raisedd, itchy or rough or  little dots Unable to report  Temp, does not have a thermometer  at home.  Fever x 3 days,  Appetite  , fluid intake is good, urine out put is adequate Giving ibuprofen  every 6 hours as needed, has not given it more than a couple of time so far. I advised  the supportive home care she is providing is adequate,  take [place] to urgent care if anyor specific  complaints of pain,  No one else in the hoem has been ill.  Has been at home only   This note was copied from a baby's chart.  Mom called for assist latching baby.  Baby is sleepy and last feeding was at 0500.  Educated on hunger cues, how to rouse for feedings, STS.  Assisted with waking for feeding and attempted to latch baby to R breast in cross cradle hold but was too sleepy and did not suck.  After several attempts, recommended to use manual pump and give EBM.  Encouraged to call for assist if needed.  LC number on whiteboard.

## 2025-02-20 NOTE — ANESTHESIA POSTPROCEDURE EVALUATION
Patient: Paulette Martinez    Procedure Summary       Date: 02/19/25 Room / Location:     Anesthesia Start: 1433 Anesthesia Stop: 2046    Procedure: LABOR ANALGESIA Diagnosis:     Scheduled Providers:  Provider: Ravin Wang MD    Anesthesia Type: epidural ASA Status: 2            Anesthesia Type: epidural    Vitals  Vitals Value Taken Time   /66 02/19/25 2115   Temp 37 °C (98.6 °F) 02/19/25 2104   Pulse 82 02/19/25 2125   Resp 16 02/19/25 1801   SpO2 100 % 02/19/25 2125   Vitals shown include unfiled device data.        Anesthesia Post Evaluation

## 2025-02-20 NOTE — OUTREACH NOTE
[facility]  IP Postpartum    Questions/Answers      Flowsheet Row Responses   Best Method for Contacting Cell   Support Person Present No   Does the patient have a car seat at the hospital Yes   Delivery Note Reviewed Reviewed   Were birth expectations met? Yes   Is there a need for additional support/resources? No   Lactation Note Reviewed Reviewed   Is additional support needed? No   Any questions or concerns? No   Any concerns related discharge meds/ability to  prescriptions? No   Confirm Postpartum OB appointment --  [reviewed]   Confirm initial well-child Pediatrician appointment date/time: --  [reviewed]   Does patient have transportation to appointments? Yes   Any other assistance needed to ensure she is able to attend appointments? No   Does patient have supplies needed at home for  care? Breast Pump, Clothing, Crib, Diapers          Visited with patient at the bedside this morning, she reports that everything is going well. She is breastfeeding and has her pump along with other necessary infant supplies. I will email [facility] rep to see her tomorrow. Reviewed f/u appts. F/u call in 1-2 weeks.     Stephany Coyle RN  Maternity Nurse Navigator    2025, 13:24 EST

## 2025-02-21 VITALS
DIASTOLIC BLOOD PRESSURE: 73 MMHG | BODY MASS INDEX: 31.92 KG/M2 | RESPIRATION RATE: 16 BRPM | SYSTOLIC BLOOD PRESSURE: 120 MMHG | HEIGHT: 65 IN | OXYGEN SATURATION: 99 % | HEART RATE: 86 BPM | WEIGHT: 191.6 LBS | TEMPERATURE: 97.9 F

## 2025-02-21 PROBLEM — D62 ANEMIA DUE TO ACUTE BLOOD LOSS: Status: ACTIVE | Noted: 2025-02-21

## 2025-02-21 PROCEDURE — 0503F POSTPARTUM CARE VISIT: CPT | Performed by: STUDENT IN AN ORGANIZED HEALTH CARE EDUCATION/TRAINING PROGRAM

## 2025-02-21 RX ORDER — ACETAMINOPHEN 500 MG
1000 TABLET ORAL EVERY 6 HOURS PRN
Qty: 60 TABLET | Refills: 0 | Status: SHIPPED | OUTPATIENT
Start: 2025-02-21

## 2025-02-21 RX ORDER — IBUPROFEN 800 MG/1
800 TABLET, FILM COATED ORAL EVERY 8 HOURS PRN
Qty: 60 TABLET | Refills: 0 | Status: SHIPPED | OUTPATIENT
Start: 2025-02-21

## 2025-02-21 RX ADMIN — IBUPROFEN 600 MG: 600 TABLET, FILM COATED ORAL at 01:08

## 2025-02-21 RX ADMIN — DOCUSATE SODIUM 100 MG: 100 CAPSULE, LIQUID FILLED ORAL at 08:14

## 2025-02-21 RX ADMIN — ACETAMINOPHEN 650 MG: 325 TABLET, FILM COATED ORAL at 08:14

## 2025-02-21 NOTE — PLAN OF CARE
Goal Outcome Evaluation:  Plan of Care Reviewed With: patient, significant other        Progress: improving     Vss. Pt resting between care & ready for d/c. Instructions gone over w/mom & s/o - all questions answered at this time. Meds 2 beds delivered & private transportation provided.

## 2025-02-21 NOTE — PLAN OF CARE
Goal Outcome Evaluation:  Plan of Care Reviewed With: patient        Progress: improving     VSS, fundus and lochia WDL, up ad craig, voiding without difficulty, pain control with PO meds, working on breastfeeding.   Problem: Adult Inpatient Plan of Care  Goal: Plan of Care Review  Outcome: Progressing  Flowsheets  Taken 2/21/2025 0520 by Claire Ingram RN  Plan of Care Reviewed With: patient  Taken 2/20/2025 0923 by Deidra Welsh RN  Progress: improving  Goal: Patient-Specific Goal (Individualized)  Outcome: Progressing  Goal: Absence of Hospital-Acquired Illness or Injury  Outcome: Progressing  Intervention: Identify and Manage Fall Risk  Recent Flowsheet Documentation  Taken 2/21/2025 0421 by Claire Ingram RN  Safety Promotion/Fall Prevention: safety round/check completed  Taken 2/21/2025 0200 by Claire Ingram RN  Safety Promotion/Fall Prevention: safety round/check completed  Taken 2/21/2025 0108 by Claire Ingram RN  Safety Promotion/Fall Prevention: safety round/check completed  Taken 2/21/2025 0050 by Claire Ingram RN  Safety Promotion/Fall Prevention: safety round/check completed  Taken 2/20/2025 2354 by Claire Ingram RN  Safety Promotion/Fall Prevention: safety round/check completed  Taken 2/20/2025 2213 by Claire Ingram RN  Safety Promotion/Fall Prevention: safety round/check completed  Taken 2/20/2025 2033 by Claire Ingram RN  Safety Promotion/Fall Prevention: safety round/check completed  Goal: Optimal Comfort and Wellbeing  Outcome: Progressing  Intervention: Monitor Pain and Promote Comfort  Recent Flowsheet Documentation  Taken 2/21/2025 0108 by Claire Ingram RN  Pain Management Interventions: pain medication given  Taken 2/21/2025 0050 by Claire Ingram RN  Pain Management Interventions:   pain management plan reviewed with patient/caregiver   heat applied   no interventions per patient request  Taken 2/20/2025 2354 by Claire Ingram RN  Pain Management  Interventions: pain medication given  Taken 2025 by Claire Ingram RN  Pain Management Interventions:   pain management plan reviewed with patient/caregiver   position adjusted  Intervention: Provide Person-Centered Care  Recent Flowsheet Documentation  Taken 2025 by Claire Ingram RN  Trust Relationship/Rapport:   care explained   choices provided   questions answered   questions encouraged   thoughts/feelings acknowledged  Goal: Readiness for Transition of Care  Outcome: Progressing     Problem: Labor  Goal: Hemostasis  Outcome: Progressing  Goal: Stable Fetal Wellbeing  Outcome: Progressing  Goal: Effective Progression to Delivery  Outcome: Progressing  Goal: Absence of Infection Signs and Symptoms  Outcome: Progressing  Goal: Acceptable Pain Control  Outcome: Progressing  Goal: Normal Uterine Contraction Pattern  Outcome: Progressing     Problem: Fall Injury Risk  Goal: Absence of Fall and Fall-Related Injury  Outcome: Progressing  Intervention: Promote Injury-Free Environment  Recent Flowsheet Documentation  Taken 2025 0421 by Claire Ingram RN  Safety Promotion/Fall Prevention: safety round/check completed  Taken 2025 0200 by Claire Ingram RN  Safety Promotion/Fall Prevention: safety round/check completed  Taken 2025 0108 by Claire Ingram RN  Safety Promotion/Fall Prevention: safety round/check completed  Taken 2025 0050 by Claire Ingram RN  Safety Promotion/Fall Prevention: safety round/check completed  Taken 2025 2354 by Claire Ingram RN  Safety Promotion/Fall Prevention: safety round/check completed  Taken 2025 2213 by Claire Ingram RN  Safety Promotion/Fall Prevention: safety round/check completed  Taken 2025 by Claire Ingram RN  Safety Promotion/Fall Prevention: safety round/check completed     Problem:  Fall Injury Risk  Goal: Absence of Fall, Infant Drop and Related Injury  Outcome: Progressing  Intervention:  Promote Injury-Free Environment  Recent Flowsheet Documentation  Taken 2/21/2025 0421 by Claire Ingram RN  Safety Promotion/Fall Prevention: safety round/check completed  Taken 2/21/2025 0200 by Claire Ingram RN  Safety Promotion/Fall Prevention: safety round/check completed  Taken 2/21/2025 0108 by Claire Ingram RN  Safety Promotion/Fall Prevention: safety round/check completed  Taken 2/21/2025 0050 by Claire Ingram RN  Safety Promotion/Fall Prevention: safety round/check completed  Taken 2/20/2025 2354 by Claire Ingram RN  Safety Promotion/Fall Prevention: safety round/check completed  Taken 2/20/2025 2213 by Clarie Ingram RN  Safety Promotion/Fall Prevention: safety round/check completed  Taken 2/20/2025 2033 by Claire Ingram RN  Safety Promotion/Fall Prevention: safety round/check completed     Problem: Anesthesia/Analgesia, Neuraxial  Goal: Safe, Effective Infusion Delivery  Outcome: Progressing  Goal: Stable Patient-Fetal Status  Outcome: Progressing  Goal: Absence of Infection Signs and Symptoms  Outcome: Progressing  Goal: Nausea and Vomiting Relief  Outcome: Progressing  Goal: Optimal Pain Control and Function  Outcome: Progressing  Intervention: Prevent or Manage Pain  Recent Flowsheet Documentation  Taken 2/21/2025 0108 by Claire Ingram RN  Pain Management Interventions: pain medication given  Taken 2/21/2025 0050 by Claire Ingram RN  Pain Management Interventions:   pain management plan reviewed with patient/caregiver   heat applied   no interventions per patient request  Taken 2/20/2025 2354 by Claire Ingram RN  Pain Management Interventions: pain medication given  Taken 2/20/2025 2033 by Claire Ingram RN  Pain Management Interventions:   pain management plan reviewed with patient/caregiver   position adjusted  Goal: Effective Oxygenation and Ventilation  Outcome: Progressing  Goal: Baseline Motor Function Return  Outcome: Progressing  Intervention: Optimize Sensorimotor  Function and Safety  Recent Flowsheet Documentation  Taken 2/21/2025 0421 by Claire Ingram RN  Safety Promotion/Fall Prevention: safety round/check completed  Taken 2/21/2025 0200 by Claire Ingram RN  Safety Promotion/Fall Prevention: safety round/check completed  Taken 2/21/2025 0108 by Claire Ingram RN  Safety Promotion/Fall Prevention: safety round/check completed  Taken 2/21/2025 0050 by Claire Ingram RN  Safety Promotion/Fall Prevention: safety round/check completed  Taken 2/20/2025 2354 by Claire Ingram RN  Safety Promotion/Fall Prevention: safety round/check completed  Taken 2/20/2025 2213 by Claire Ingram RN  Safety Promotion/Fall Prevention: safety round/check completed  Taken 2/20/2025 2033 by Claire Ingram RN  Safety Promotion/Fall Prevention: safety round/check completed  Goal: Effective Urinary Elimination  Outcome: Progressing     Problem: Skin Injury Risk Increased  Goal: Skin Health and Integrity  Outcome: Progressing  Intervention: Optimize Skin Protection  Recent Flowsheet Documentation  Taken 2/20/2025 2033 by Claire Ingram RN  Activity Management: up ad craig

## 2025-02-21 NOTE — DISCHARGE SUMMARY
[facility]  Delivery Discharge Summary/Progress Note    Admission Diagnosis:  Principal Problem:    Post-dates pregnancy  Active Problems:    Rh negative, antepartum    History of shoulder dystocia in prior pregnancy, currently pregnant    Maternal anemia in pregnancy, antepartum    Vaginal delivery    BMI 31.0-31.9,adult    Anemia due to acute blood loss    Discharge Diagnosis:  As above    Gestational Age: 40w1d    Date of Delivery: 2025    Delivery Type: Vaginal, Spontaneous     Intrapartum Course: See delivery note for details.    Postpartum Course:  Uncomplicated pp course. Pt is tolerating po well, ambulating and voiding without difficulty.  Pain is well controlled. Bleeding is minimal/ appropriate for pp state.     Physical Exam:    Vitals:   Vitals:    25 0048 25 0733 25 1958 25 0728   BP: 132/69 100/66 92/56 120/73   BP Location: Right arm Right arm Right arm Right arm   Patient Position: Sitting Lying Sitting Sitting   Pulse: 80 66 84 86   Resp: 18 16 16 16   Temp: 99.2 °F (37.3 °C) 97.8 °F (36.6 °C) 98 °F (36.7 °C) 97.9 °F (36.6 °C)   TempSrc: Oral Oral Oral Oral   SpO2:       Weight:       Height:         Temp (24hrs), Av °F (36.7 °C), Min:97.9 °F (36.6 °C), Max:98 °F (36.7 °C)      General Appearance:    Alert, cooperative, in no acute distress   Abdomen:    Fundus firm below umbilicus, nontender           Extremities: Moves all extremities well, No edema , no cyanosis, no redness.     Labs:   Results from last 7 days   Lab Units 25  0747 25  1056   WBC 10*3/mm3 11.05* 8.59   HEMOGLOBIN g/dL 9.7* 10.8*   HEMATOCRIT % 29.7* 35.7   PLATELETS 10*3/mm3 166 207     Results from last 7 days   Lab Units 25  1056   GLUCOSE mg/dL 91         Discharge Medications:     Discharge Medications        New Medications        Instructions Start Date   acetaminophen 500 MG tablet  Commonly known as: TYLENOL   1,000 mg, Oral, Every 6 Hours PRN      ibuprofen 800 MG  tablet  Commonly known as: ADVIL,MOTRIN   800 mg, Oral, Every 8 Hours PRN             Continue These Medications        Instructions Start Date   ferrous sulfate 325 (65 FE) MG tablet   325 mg, Oral, Daily With Breakfast      Prenatal 27-1 27-1 MG tablet tablet   1 tablet, Daily               Feeding method: Breastfeeding Status: Yes    Blood Type: RH Negative Rhogam Given       Plan:  Discharge to home.      Future Appointments   Date Time Provider Department Center   3/14/2025 11:00 AM Cristy Metz MD [facility]   4/10/2025  8:15 AM Ravin Roberts APRN [facility]       All discharge instructions were reviewed with pt including bleeding warnings, s/sx of pp depression, and warning signs in the pp period for which to seek medical attention including but not limited to s/sx of hypertension and thromboembolism.      Alfreda Rich MD   2/21/2025 11:44 EST

## 2025-02-24 ENCOUNTER — TELEPHONE (OUTPATIENT)
Dept: OBSTETRICS AND GYNECOLOGY | Age: 25
End: 2025-02-24

## 2025-02-25 NOTE — PROGRESS NOTES
"Continued Stay Note  [facility]     Patient Name: Paulette Martinez  MRN: [id number]  Today's Date: 2/25/2025    Admit Date: 2/19/2025    Plan: Infant may discharge to mother when medically ready; CSW will follow cord tox. JOBY Go.   Discharge Plan       Row Name 02/25/25 1141       Plan    Plan Comments Mother: Paulette Martinez, MRN: [id number]; infant: Emily \"Aliyana\" Michelle, MRN: [id number]. CSW has reviewed infant's cord toxicology results and infant's cords was negative for any substances. Mandated CPS reporting is not required at this time. JOBY Go.                   Discharge Codes    No documentation.                 Expected Discharge Date and Time       Expected Discharge Date Expected Discharge Time    Feb 21, 2025               FADY George    "

## 2025-03-04 ENCOUNTER — PATIENT OUTREACH (OUTPATIENT)
Dept: LABOR AND DELIVERY | Facility: HOSPITAL | Age: 25
End: 2025-03-04
Payer: COMMERCIAL

## 2025-03-04 NOTE — OUTREACH NOTE
Motherhood Connection  Postpartum Check-In    Questions/Answers      Flowsheet Row Responses   Visit Setting Telephone   Best Method for Contacting Cell   OB Discharge Note Reviewed  Reviewed   OB Discharge Medications Reviewed  Reviewed    discharged home with mother? Yes   Current Pain Levels 0-10 0   At Rest Pain Levels 0-10 0   Pain level with activity 0-10 0   Acceptable Pain Level 0-10 0   Verbalized Emotional State Acceptance   Family/Support Network Significant Other   Level of Involvement in Care Supportive, Interactive, Attentive   Do you feel comfortable in your relationship with your baby? Yes   Have members of your household adjusted to your baby? Yes   Is the baby's father supportive and/or involved with the baby? Yes   How does your partner feel about the baby? Happy, Involved   Do you feel safe at home, school and work? Yes   Are you in a relationship with someone who threatens you or hurts you? No   Do you have the resources to keep yourself and your baby healthy and safe? Yes   Lochia (per patient report) Brown-elinor Red   Amount Scant   Number of pads per day 3   Lochia Odor None   Is patient breastfeeding? Yes, pumping   pumping - Left Breast Nontender, Full   Pumping - Right Breast Nontender, Full   Pumping - Left Nipple Intact   Pumping - Right Nipple Intact   Frequency after each feed, every 2-4 hours   Pumping Quantity 10-11 oz   Storage of Milk freezing   Postpartum Depression Screening Education Education Provided   Doctor Appointments: Education Provided   Breastfeeding Education Education Provided   Postpartum Care Education Education Provided   S & S to report Education Provided   Followup Appointments Made Yes   Well Child Visit Appointments Made Yes   Appointment Date 25   Provider/Agency Dr Metz   Well Child Checkup Provider Name SIMONE [place]   Well Child Check Up Date: 25   Did you complete the visit? Yes   Were there any specific concerns? No   Umbilical Cord No  reported signs or symptoms   Feeding Readiness Cues: Crying, Eager   Infant Feeding Method Breast   Frequency of feedings every 2-4 hours   Number of wet diapers x 24 hours 8   Last BM x 24 hours 6   What safe sleep surface is available? Bassinet   Are there stuffed animals, toys, pillows, quilts, blankets, wedges, positioners, bumpers or other loose bedding in the infant's sleeping environment? No   Where does the baby usually sleep? Bassinet   Does the baby ever share a sleep surface with a sibling, adult or pet? No   Does the baby ever share a sleep surface in a bed, couch, recliner or other? No   What position do you place your baby to sleep for naps? Back   What position do you place your baby to sleep at night Back   Are you and/or other caregivers smoking inside or outside the baby's home? No   Is the infant dressed appropiately for the temperature of the home? Yes   Do you use a clean, dry pacifier that is not attached to a string or stuffed animal? --  [will not take]            Review of Systems    Most Recent [place]  Depression Scale Score (EPDS)    Performed by a clinician: 3 (3/4/2025  1:36 PM)    5 Ps Screen  Done     Pt recovering well with no reported pain and scant bleeding. She is exclusively breastfeeding and pumping. WIC benefits active. Infant seen at the pediatrician and returns tomorrow. PP visit is scheduled. EPDS low but she would like to restart virtual therapy, referral sent today. She reports good support from her . Will send to RN call center.     Stephany STALEY - RN  Maternity Nurse Navigator    3/4/2025, 13:50 EST

## 2025-03-11 ENCOUNTER — PATIENT OUTREACH (OUTPATIENT)
Dept: CALL CENTER | Facility: HOSPITAL | Age: 25
End: 2025-03-11
Payer: COMMERCIAL

## 2025-03-11 NOTE — OUTREACH NOTE
Motherhood Connection Survey      Flowsheet Row Responses   [facility]   [facility] attempt successful? Yes   Call start time 1443   Call end time 1449   Baby sex Girl    discharged home with mother? Yes   Baby sex Girl   Delivery type Vaginal   Emotional State Comment pt  did not call vitual therapy. Pt stated Stephany was going to send a referral into her doctor. Sjhe haS NOT RECIEVED any call yet.   Family support No   Do you have all necessary resources to care for you and your baby?  Yes   Have members of your household adjusted to your baby? Yes   Did you have any problems with pre-eclampsia during this pregnancy? No   Did you have blood glucose issues during this pregnancy No   Lochia amount Light   Lochia per patient report [place]   Did you have an episiotomy/tear/abdominal incision? No   Feeding Method Breast   Frequency 2 hours   Duration 30   Pumping Yes   Storage of Milk freezing   Breast Condition No   Nipple Condition No   Nursing Interventions Supportive bra   Signs baby is ready to eat Rooting   Feeding tolerance Wet/dirty diapers   Number of wet diapers x 24 hours 7-8   Last BM x 24 hours 7   Umbilical Cord No reported signs or symptoms   Where does the baby usually sleep? Bassinet   Are there stuffed animals, toys, pillows, quilts, blankets, wedges, positioners, bumpers or other loose bedding in the infant's sleeping environment? No   Does the baby ever share a sleep surface in a bed, couch, recliner or other? No   What position do you lay your baby down to sleep? Back   Are you and/or other caregivers smoking inside or outside the baby's home? No   Mom appointment comments: appt on 3/24   Baby appointment comments: peds appt x 3   Call completed? Yes   How satisfied were you with the Motherhood Connection Program? 5   Anyone you would like to recognize from your time in the Motherhood Connection Program Stephany MOSCOSO T - Registered  Nurse

## 2025-03-11 NOTE — OUTREACH NOTE
Motherhood Connection Survey      Flowsheet Row Responses   [facility] facility patient discharged from? [place]   Week 1 attempt successful? No  [vag female]   Unsuccessful attempts Attempt 1   Reschedule Today  [left msg]              DANIS FENTON - Registered Nurse

## 2025-03-24 ENCOUNTER — POSTPARTUM VISIT (OUTPATIENT)
Dept: OBSTETRICS AND GYNECOLOGY | Age: 25
End: 2025-03-24
Payer: COMMERCIAL

## 2025-03-24 VITALS
HEIGHT: 65 IN | SYSTOLIC BLOOD PRESSURE: 120 MMHG | BODY MASS INDEX: 27.49 KG/M2 | DIASTOLIC BLOOD PRESSURE: 74 MMHG | WEIGHT: 165 LBS

## 2025-03-24 DIAGNOSIS — Z30.011 ENCOUNTER FOR INITIAL PRESCRIPTION OF CONTRACEPTIVE PILLS: ICD-10-CM

## 2025-03-24 DIAGNOSIS — Z13.89 SCREENING FOR BLOOD OR PROTEIN IN URINE: ICD-10-CM

## 2025-03-24 DIAGNOSIS — R10.2 PELVIC PRESSURE IN FEMALE: ICD-10-CM

## 2025-03-24 PROBLEM — O26.899 RH NEGATIVE, ANTEPARTUM: Status: RESOLVED | Noted: 2024-07-24 | Resolved: 2025-03-24

## 2025-03-24 PROBLEM — Z34.90 PREGNANCY: Status: RESOLVED | Noted: 2024-08-23 | Resolved: 2025-03-24

## 2025-03-24 PROBLEM — D62 ANEMIA DUE TO ACUTE BLOOD LOSS: Status: RESOLVED | Noted: 2025-02-21 | Resolved: 2025-03-24

## 2025-03-24 PROBLEM — O26.893 PELVIC PRESSURE IN PREGNANCY, THIRD TRIMESTER: Status: RESOLVED | Noted: 2025-01-31 | Resolved: 2025-03-24

## 2025-03-24 PROBLEM — Z67.91 RH NEGATIVE, ANTEPARTUM: Status: RESOLVED | Noted: 2024-07-24 | Resolved: 2025-03-24

## 2025-03-24 PROBLEM — O99.019 MATERNAL ANEMIA IN PREGNANCY, ANTEPARTUM: Status: RESOLVED | Noted: 2024-12-03 | Resolved: 2025-03-24

## 2025-03-24 PROBLEM — O09.299 HISTORY OF SHOULDER DYSTOCIA IN PRIOR PREGNANCY, CURRENTLY PREGNANT: Status: RESOLVED | Noted: 2024-08-23 | Resolved: 2025-03-24

## 2025-03-24 PROBLEM — O48.0 POST-DATES PREGNANCY: Status: RESOLVED | Noted: 2025-02-19 | Resolved: 2025-03-24

## 2025-03-24 LAB
BILIRUB BLD-MCNC: NEGATIVE MG/DL
GLUCOSE UR STRIP-MCNC: NEGATIVE MG/DL
KETONES UR QL: NEGATIVE
LEUKOCYTE EST, POC: ABNORMAL
NITRITE UR-MCNC: NEGATIVE MG/ML
PH UR: 7 [PH] (ref 5–8)
PROT UR STRIP-MCNC: ABNORMAL MG/DL
RBC # UR STRIP: NEGATIVE /UL
SP GR UR: 1.02 (ref 1–1.03)
UROBILINOGEN UR QL: NORMAL

## 2025-03-24 PROCEDURE — 99213 OFFICE O/P EST LOW 20 MIN: CPT | Performed by: OBSTETRICS & GYNECOLOGY

## 2025-03-24 RX ORDER — ACETAMINOPHEN AND CODEINE PHOSPHATE 120; 12 MG/5ML; MG/5ML
1 SOLUTION ORAL DAILY
Qty: 84 TABLET | Refills: 3 | Status: SHIPPED | OUTPATIENT
Start: 2025-03-24 | End: 2026-03-24

## 2025-03-24 NOTE — PROGRESS NOTES
"Jody Martinez is a 24 y.o. female who presents for a postpartum visit. She is 5 weeks postpartum following a spontaneous vaginal delivery. I have fully reviewed the prenatal and intrapartum course. The delivery was at 40-1 gestational weeks. Outcome: spontaneous vaginal delivery. Anesthesia: epidural. Postpartum course has been uncomplicated. Baby's course has been uncomplicated. Baby is feeding by breast. Bleeding no bleeding. Bowel function is normal. Bladder function is abnormal: pressure  . Patient is not sexually active. Contraception method is oral progesterone-only contraceptive. Postpartum depression screening: negative.    The following portions of the patient's history were reviewed and updated as appropriate: allergies, current medications, past family history, past medical history, past social history, past surgical history, and problem list.    Review of Systems  Pertinent items are noted in HPI.    Objective   /74   Ht 165.1 cm (65\")   Wt 74.8 kg (165 lb)   LMP 05/14/2024 (Exact Date)   Breastfeeding Yes   BMI 27.46 kg/m²    General:  alert, appears stated age, and cooperative    Vulva:  not evaluated   Vagina: not evaluated   Assessment & Plan   postpartum exam. Pap smear not done at today's visit.    1. Contraception: oral progesterone-only contraceptive  2.  Follow up in: 6 months or as needed.  3. Pelvic pressure - urine cx sent     Counseling was given to patient for the following topics: instructions for management, risks and benefits of treatment options, and importance of treatment compliance . Total time of the encounter was 20 minutes and 15 minutes was spent counseling.       "

## 2025-03-26 LAB
BACTERIA UR CULT: NORMAL
BACTERIA UR CULT: NORMAL

## 2025-04-17 ENCOUNTER — OFFICE VISIT (OUTPATIENT)
Dept: INTERNAL MEDICINE | Facility: CLINIC | Age: 25
End: 2025-04-17
Payer: COMMERCIAL

## 2025-04-17 VITALS
HEIGHT: 65 IN | BODY MASS INDEX: 24.83 KG/M2 | SYSTOLIC BLOOD PRESSURE: 118 MMHG | OXYGEN SATURATION: 100 % | TEMPERATURE: 97.5 F | HEART RATE: 69 BPM | DIASTOLIC BLOOD PRESSURE: 66 MMHG | WEIGHT: 149 LBS

## 2025-04-17 DIAGNOSIS — Z00.00 ANNUAL PHYSICAL EXAM: Primary | ICD-10-CM

## 2025-04-17 NOTE — PROGRESS NOTES
"Chief Complaint  Annual Exam    Subjective        Paulette Martinez presents to [facility]  History of Present Illness  24-year-old female presenting for annual exam.  Currently breast-feeding a .  Has regular checkups with Dr. Metz post delivery.  States that she is sleeping okay.  Bowels moving regularly.  Is trying to become more active.  Overall is doing well.  States that she is not feeling any baby blues. Denies chest pain, difficulty breathing, swelling of hands or feet, constipation, dysuria and changes in vision or hearing.      Objective   Vital Signs:  /66 (BP Location: Right arm, Patient Position: Sitting, Cuff Size: Adult)   Pulse 69   Temp 97.5 °F (36.4 °C) (Temporal)   Ht 165.1 cm (65\")   Wt 67.6 kg (149 lb)   SpO2 100%   BMI 24.79 kg/m²   Estimated body mass index is 24.79 kg/m² as calculated from the following:    Height as of this encounter: 165.1 cm (65\").    Weight as of this encounter: 67.6 kg (149 lb).       BMI is within normal parameters. No other follow-up for BMI required.      Physical Exam  Vitals reviewed.   Constitutional:       Appearance: Normal appearance.   HENT:      Head: Normocephalic.      Right Ear: Tympanic membrane normal.      Left Ear: Tympanic membrane normal.      Nose: Nose normal.      Mouth/Throat:      Mouth: Mucous membranes are moist.      Pharynx: Oropharynx is clear.   Eyes:      Pupils: Pupils are equal, round, and reactive to light.   Cardiovascular:      Rate and Rhythm: Normal rate.      Pulses: Normal pulses.      Heart sounds: Normal heart sounds.   Pulmonary:      Effort: Pulmonary effort is normal.      Breath sounds: Normal breath sounds.   Abdominal:      General: Bowel sounds are normal.      Palpations: Abdomen is soft.   Musculoskeletal:         General: Normal range of motion.      Cervical back: Normal range of motion.   Skin:     General: Skin is warm and dry.      Capillary Refill: Capillary refill " takes less than 2 seconds.   Neurological:      General: No focal deficit present.      Mental Status: She is alert and oriented to person, place, and time.   Psychiatric:         Mood and Affect: Mood normal.         Behavior: Behavior normal.         Thought Content: Thought content normal.         Judgment: Judgment normal.        Result Review :      Common labs          2/7/2025    12:35 2/19/2025    10:56 2/20/2025    07:47   Common Labs   Glucose  91     BUN  5     Creatinine  0.49     Sodium  138     Potassium  3.9     Chloride  105     Calcium  9.1     Albumin  3.4     Total Bilirubin  0.2     Alkaline Phosphatase  188     AST (SGOT)  15     ALT (SGPT)  12     WBC 10.61  8.59  11.05    Hemoglobin 9.6  10.8  9.7    Hematocrit 29.8  35.7  29.7    Platelets 232  207  166          Current Outpatient Medications on File Prior to Visit   Medication Sig Dispense Refill    norethindrone (MICRONOR) 0.35 MG tablet Take 1 tablet by mouth Daily. 84 tablet 3     No current facility-administered medications on file prior to visit.                Assessment and Plan     Diagnoses and all orders for this visit:    1. Annual physical exam (Primary)  -     Comprehensive Metabolic Panel; Future  -     Urinalysis With Microscopic If Indicated (No Culture) - Urine, Clean Catch; Future  -     Lipid Panel With / Chol / HDL Ratio; Future  -     TSH Rfx On Abnormal To Free T4; Future  -     CBC & Differential; Future    2. Mother currently breast-feeding        Patient Instructions   Stay active. Stay hydrated with water. Labs in the future. Recommend vaccines in the fall. Follow-up next year for annual and fasting labs.     The patient was counseled regarding nutrition, physical activity, healthy weight, injury prevention, misuse of tobacco, alcohol and illicit drugs, mental health, immunizations, and screenings.         Follow Up     Return in about 1 year (around 4/17/2026) for Annual physical.  Patient was given instructions  and counseling regarding her condition or for health maintenance advice. Please see specific information pulled into the AVS if appropriate.

## 2025-04-17 NOTE — PATIENT INSTRUCTIONS
Stay active. Stay hydrated with water. Labs in the future. Recommend vaccines in the fall. Follow-up next year for annual and fasting labs.

## (undated) DEVICE — Device

## (undated) DEVICE — SACK GZ PERM

## (undated) DEVICE — VACURETTE CRV RIGD 7MM DISP EA/10

## (undated) DEVICE — ST COL BERKELY TBG

## (undated) DEVICE — LOU D & C HYSTEROSCOPY: Brand: MEDLINE INDUSTRIES, INC.

## (undated) DEVICE — TBG W FLTR FOR BERKELEY SYSTEM

## (undated) DEVICE — GLV SURG BIOGEL LTX PF 6 1/2

## (undated) DEVICE — CANSTR SXN UTER NO FLTR SURG

## (undated) DEVICE — STRAP STIRUP WO/ RNG

## (undated) DEVICE — HOSE BT TO BT VAC CURETTAGE SNGL PT USE EA/10